# Patient Record
Sex: FEMALE | Race: WHITE | NOT HISPANIC OR LATINO | Employment: OTHER | URBAN - METROPOLITAN AREA
[De-identification: names, ages, dates, MRNs, and addresses within clinical notes are randomized per-mention and may not be internally consistent; named-entity substitution may affect disease eponyms.]

---

## 2017-05-12 ENCOUNTER — OFFICE VISIT (OUTPATIENT)
Dept: URGENT CARE | Facility: CLINIC | Age: 72
End: 2017-05-12
Payer: COMMERCIAL

## 2017-05-12 VITALS
RESPIRATION RATE: 16 BRPM | OXYGEN SATURATION: 94 % | BODY MASS INDEX: 29.24 KG/M2 | TEMPERATURE: 97 F | WEIGHT: 165 LBS | DIASTOLIC BLOOD PRESSURE: 82 MMHG | HEART RATE: 68 BPM | SYSTOLIC BLOOD PRESSURE: 120 MMHG

## 2017-05-12 DIAGNOSIS — K21.9 GASTROESOPHAGEAL REFLUX DISEASE, ESOPHAGITIS PRESENCE NOT SPECIFIED: ICD-10-CM

## 2017-05-12 PROCEDURE — 4040F PNEUMOC VAC/ADMIN/RCVD: CPT | Performed by: FAMILY MEDICINE

## 2017-05-12 PROCEDURE — 3014F SCREEN MAMMO DOC REV: CPT | Performed by: FAMILY MEDICINE

## 2017-05-12 PROCEDURE — G8419 CALC BMI OUT NRM PARAM NOF/U: HCPCS | Performed by: FAMILY MEDICINE

## 2017-05-12 PROCEDURE — 1036F TOBACCO NON-USER: CPT | Performed by: FAMILY MEDICINE

## 2017-05-12 PROCEDURE — G8432 DEP SCR NOT DOC, RNG: HCPCS | Performed by: FAMILY MEDICINE

## 2017-05-12 PROCEDURE — 99214 OFFICE O/P EST MOD 30 MIN: CPT | Performed by: FAMILY MEDICINE

## 2017-05-12 PROCEDURE — 1101F PT FALLS ASSESS-DOCD LE1/YR: CPT | Performed by: FAMILY MEDICINE

## 2017-05-12 RX ORDER — OMEPRAZOLE 20 MG/1
20 CAPSULE, DELAYED RELEASE ORAL 2 TIMES DAILY
Qty: 60 CAP | Refills: 1 | Status: SHIPPED | OUTPATIENT
Start: 2017-05-12 | End: 2017-06-11

## 2017-05-12 RX ORDER — RANITIDINE 150 MG/1
150 TABLET ORAL 2 TIMES DAILY
COMMUNITY
End: 2019-01-24

## 2017-05-12 ASSESSMENT — ENCOUNTER SYMPTOMS
DIARRHEA: 0
COUGH: 1
SENSORY CHANGE: 0
SPUTUM PRODUCTION: 0
WEIGHT LOSS: 0
FOCAL WEAKNESS: 0
VOMITING: 0
ABDOMINAL PAIN: 0

## 2017-05-12 NOTE — MR AVS SNAPSHOT
"        Alotn FernandezBro   2017 10:30 AM   Office Visit   MRN: [id number]    Department:  [facility]   Dept Phone:  [phone]    Description:  Female : 1945   Provider:  Jacob Booth M.D.           Reason for Visit     Gastrophageal Reflux x 2 wks, burning pain on mid chest, feeling boated and light headed off /on      Allergies as of 2017     Allergen Noted Reactions    Penicillins 2012       'NASTY\"      You were diagnosed with     Gastroesophageal reflux disease, esophagitis presence not specified   [[id number]]         Vital Signs     Blood Pressure Pulse Temperature Respirations Weight Oxygen Saturation    120/82 mmHg 68 36.1 °C (97 °F) 16 74.844 kg (165 lb) 94%    Smoking Status                   Former Smoker           Basic Information     Date Of Birth Sex Race Ethnicity Preferred Language    1945 Female White Non- English      Problem List              ICD-10-CM Priority Class Noted - Resolved    HTN (hypertension), benign (Chronic) I10   2011 - Present    DJD (degenerative joint disease) of cervical spine M47.812   2012 - Present    Dyslipidemia E78.5   10/25/2012 - Present    Acquired hypothyroidism E03.9   2015 - Present    Tinnitus H93.19   2015 - Present    Family history of breast cancer Z80.3   2016 - Present      Health Maintenance        Date Due Completion Dates    IMM DTaP/Tdap/Td Vaccine (1 - Tdap) 1964 ---    MAMMOGRAM 1985 ---    IMM ZOSTER VACCINE 2005 ---    BONE DENSITY 2010 ---    COLON CANCER SCREENING ANNUAL FIT 2015            Current Immunizations     13-VALENT PCV PREVNAR 2016    Pneumococcal polysaccharide vaccine (PPSV-23) 2012  9:30 AM      Below and/or attached are the medications your provider expects you to take. Review all of your home medications and newly ordered medications with your provider and/or pharmacist. Follow medication instructions as directed by " your provider and/or pharmacist. Please keep your medication list with you and share with your provider. Update the information when medications are discontinued, doses are changed, or new medications (including over-the-counter products) are added; and carry medication information at all times in the event of emergency situations     Allergies:  PENICILLINS - (reactions not documented)               Medications  Valid as of: May 12, 2017 - 11:07 AM    Generic Name Brand Name Tablet Size Instructions for use    Benzonatate (Cap) TESSALON 200 MG Take 1 Cap by mouth 3 times a day as needed for Cough.        Cholecalciferol (Tab) vitamin D 2000 UNIT Take 2,000 Tabs by mouth every day.        HydroCHLOROthiazide (Tab) HYDRODIURIL 25 MG Take 1 Tab by mouth every day.        Levothyroxine Sodium (Tab) SYNTHROID 100 MCG TAKE 1 TABLET BY MOUTH EVERY MORNING LIAN EMPTY STOMACH        Losartan Potassium (Tab) COZAAR 100 MG TAKE 1 TABLET BY MOUTH ONCE DAILY*GENERIC FOR COZAAR        Magnesium Gluconate (Tab) MAG-G 500 MG Take 1,000 mg by mouth 3 times a day. Indications: 1000mg daily        Multiple Vitamin   Take  by mouth every day.        Omeprazole (CAPSULE DELAYED RELEASE) PRILOSEC 20 MG Take 1 Cap by mouth 2 times a day for 30 days.        RaNITidine HCl (Tab) ZANTAC 150 MG Take 150 mg by mouth 2 times a day.        .                 Medicines prescribed today were sent to:     [id number] [place] [facility] [address]    [address] [facility] [place] [address]    Phone: [phone] Fax: [phone]    Open 24 Hours?: No      Medication refill instructions:       If your prescription bottle indicates you have medication refills left, it is not necessary to call your provider’s office. Please contact your pharmacy and they will refill your medication.    If your prescription bottle indicates you do not have any refills left, you may request refills at any time through one of the following ways: The online [employer] system (except  Urgent Care), by calling your provider’s office, or by asking your pharmacy to contact your provider’s office with a refill request. Medication refills are processed only during regular business hours and may not be available until the next business day. Your provider may request additional information or to have a follow-up visit with you prior to refilling your medication.   *Please Note: Medication refills are assigned a new Rx number when refilled electronically. Your pharmacy may indicate that no refills were authorized even though a new prescription for the same medication is available at the pharmacy. Please request the medicine by name with the pharmacy before contacting your provider for a refill.        Referral     A referral request has been sent to our patient care coordination department. Please allow 3-5 business days for us to process this request and contact you either by phone or mail. If you do not hear from us by the 5th business day, please call us at [phone].           [employer] Access Code: Activation code not generated  Current [employer] Status: Active

## 2017-05-12 NOTE — PROGRESS NOTES
Subjective:      Alton Childs is a 71 y.o. female who presents with Gastrophageal Reflux            Gastrophageal Reflux  She complains of coughing (intermittent). She reports no abdominal pain. Pertinent negatives include no weight loss.   2 weeks burning chest pain to throat. Has been relatively constant although waxing and waning. Currently mild. Possibly related to drinking expresso. Mild relief with zantac. Improves with belching. Chronic palpitations unchanged. No nausea. No diaphoresis. No change with exertion. No change with position but always sleeps up due to chronic neck issues.   No other aggravating or alleviating factors.    Review of Systems   Constitutional: Negative for weight loss and malaise/fatigue.   Respiratory: Positive for cough (intermittent). Negative for sputum production.    Cardiovascular: Negative for leg swelling.   Gastrointestinal: Negative for vomiting, abdominal pain and diarrhea.   Neurological: Negative for sensory change and focal weakness.   .  Medications, Allergies, and current problem list reviewed today in Epic         Objective:     /82 mmHg  Pulse 68  Temp(Src) 36.1 °C (97 °F)  Resp 16  Wt 74.844 kg (165 lb)  SpO2 94%     Physical Exam   Constitutional: She appears well-developed and well-nourished. No distress.   HENT:   Head: Normocephalic and atraumatic.   Eyes: Conjunctivae are normal.   Neck: Neck supple.   Cardiovascular: Normal rate and regular rhythm.    Murmur (soft systolic RUSB) heard.  Pulmonary/Chest: Effort normal and breath sounds normal. She has no wheezes.   Abdominal: Soft. Bowel sounds are normal. She exhibits no mass. There is no tenderness. There is no guarding.   Lymphadenopathy:     She has no cervical adenopathy.   Skin: Skin is warm and dry. No rash noted.               Assessment/Plan:     1. Gastroesophageal reflux disease, esophagitis presence not specified  REFERRAL TO GASTROENTEROLOGY    omeprazole (PRILOSEC) 20 MG  delayed-release capsule     Differential diagnosis, natural history, supportive care, and indications for immediate follow-up discussed at length.

## 2017-05-17 ENCOUNTER — HOSPITAL ENCOUNTER (OUTPATIENT)
Dept: RADIOLOGY | Facility: MEDICAL CENTER | Age: 72
End: 2017-05-17
Attending: INTERNAL MEDICINE
Payer: COMMERCIAL

## 2017-05-17 ENCOUNTER — OFFICE VISIT (OUTPATIENT)
Dept: MEDICAL GROUP | Facility: MEDICAL CENTER | Age: 72
End: 2017-05-17
Payer: COMMERCIAL

## 2017-05-17 VITALS
OXYGEN SATURATION: 94 % | TEMPERATURE: 97.3 F | BODY MASS INDEX: 28 KG/M2 | HEART RATE: 66 BPM | SYSTOLIC BLOOD PRESSURE: 122 MMHG | WEIGHT: 164 LBS | DIASTOLIC BLOOD PRESSURE: 70 MMHG | RESPIRATION RATE: 16 BRPM | HEIGHT: 64 IN

## 2017-05-17 DIAGNOSIS — K21.9 GASTROESOPHAGEAL REFLUX DISEASE WITHOUT ESOPHAGITIS: ICD-10-CM

## 2017-05-17 DIAGNOSIS — Z12.31 VISIT FOR SCREENING MAMMOGRAM: ICD-10-CM

## 2017-05-17 DIAGNOSIS — E03.9 ACQUIRED HYPOTHYROIDISM: ICD-10-CM

## 2017-05-17 DIAGNOSIS — Z13.220 SCREENING, LIPID: ICD-10-CM

## 2017-05-17 DIAGNOSIS — R07.9 CHEST PAIN, UNSPECIFIED TYPE: ICD-10-CM

## 2017-05-17 DIAGNOSIS — E78.5 DYSLIPIDEMIA: ICD-10-CM

## 2017-05-17 DIAGNOSIS — I10 HTN (HYPERTENSION), BENIGN: Chronic | ICD-10-CM

## 2017-05-17 PROCEDURE — 71020 DX-CHEST-2 VIEWS: CPT

## 2017-05-17 PROCEDURE — G8432 DEP SCR NOT DOC, RNG: HCPCS | Performed by: INTERNAL MEDICINE

## 2017-05-17 PROCEDURE — 93000 ELECTROCARDIOGRAM COMPLETE: CPT | Performed by: INTERNAL MEDICINE

## 2017-05-17 PROCEDURE — 1101F PT FALLS ASSESS-DOCD LE1/YR: CPT | Performed by: INTERNAL MEDICINE

## 2017-05-17 PROCEDURE — G8419 CALC BMI OUT NRM PARAM NOF/U: HCPCS | Performed by: INTERNAL MEDICINE

## 2017-05-17 PROCEDURE — 3014F SCREEN MAMMO DOC REV: CPT | Performed by: INTERNAL MEDICINE

## 2017-05-17 PROCEDURE — 99215 OFFICE O/P EST HI 40 MIN: CPT | Performed by: INTERNAL MEDICINE

## 2017-05-17 PROCEDURE — 4040F PNEUMOC VAC/ADMIN/RCVD: CPT | Performed by: INTERNAL MEDICINE

## 2017-05-17 PROCEDURE — 1036F TOBACCO NON-USER: CPT | Performed by: INTERNAL MEDICINE

## 2017-05-17 NOTE — Clinical Note
May 17, 2017        Alton Childs      Please excuse work 5/15/17-5/22/17.                         Rufino Robles

## 2017-05-17 NOTE — PROGRESS NOTES
CC: Multiple issues    HPI:   Alton presents today with the following.    1. Chest pain, unspecified type  Presents after going to urgent care complaining of chest pain. She was given acid medications with 20% improvement in symptoms. She has no blood in her stool or black stool which is having some mild diarrhea. She reports there is some pain radiating around her breasts. She has not been interested in general screenings in the past. She is not been active during this time frame she cannot tell if activity worsens her symptoms or not. There is no relationship to food. She has no fevers or chills. She did have a preceding cough that is since resolved as well. No hemoptysis. She does report some dizziness.    2. Gastroesophageal reflux disease without esophagitis  She does have reflux symptoms on Prilosec has improved her symptoms slightly. Reports stools are slightly loose however no black stool.     3. Acquired hypothyroidism  Well overdue for recheck of her thyroid.    4. HTN (hypertension), benign  Compliant blood pressure medications currently well controlled.    5. Dyslipidemia  Currently not on meds coming due for recheck blood work.    6. Screening, lipid      7. Visit for screening mammogram        Patient Active Problem List    Diagnosis Date Noted   • Family history of breast cancer 04/21/2016   • Tinnitus 06/09/2015   • Acquired hypothyroidism 06/02/2015   • Dyslipidemia 10/25/2012   • DJD (degenerative joint disease) of cervical spine 07/18/2012   • HTN (hypertension), benign 04/22/2011       Current Outpatient Prescriptions   Medication Sig Dispense Refill   • ranitidine (ZANTAC) 150 MG Tab Take 150 mg by mouth 2 times a day.     • omeprazole (PRILOSEC) 20 MG delayed-release capsule Take 1 Cap by mouth 2 times a day for 30 days. 60 Cap 1   • hydrochlorothiazide (HYDRODIURIL) 25 MG Tab Take 1 Tab by mouth every day. 90 Tab 1   • magnesium gluconate (MAG-G) 500 MG tablet Take 1,000 mg by mouth 3 times a  "day. Indications: 1000mg daily     • losartan (COZAAR) 100 MG Tab TAKE 1 TABLET BY MOUTH ONCE DAILY*GENERIC FOR COZAAR 30 Tab 11   • levothyroxine (SYNTHROID) 100 MCG Tab TAKE 1 TABLET BY MOUTH EVERY MORNING LIAN EMPTY STOMACH 30 Tab 11   • Cholecalciferol (VITAMIN D) 2000 UNIT TABS Take 2,000 Tabs by mouth every day. 30 Each 6   • Multiple Vitamin (MULTI-VITAMIN PO) Take  by mouth every day.       No current facility-administered medications for this visit.         Allergies as of 05/17/2017 - Casey as Reviewed 05/17/2017   Allergen Reaction Noted   • Penicillins  07/11/2012        ROS: As per HPI.    /70 mmHg  Pulse 66  Temp(Src) 36.3 °C (97.3 °F)  Resp 16  Ht 1.626 m (5' 4\")  Wt 74.39 kg (164 lb)  BMI 28.14 kg/m2  SpO2 94%    Physical Exam:  Gen:         Alert and oriented, No apparent distress.  Neck:        No Lymphadenopathy or Bruits.  Lungs:     Clear to auscultation bilaterally  CV:          Regular rate and rhythm. No murmurs, rubs or gallops.               Ext:          No clubbing, cyanosis, edema.    EKG:  Normal sinus rythm, no acute st-t wave changes.  Assessment and Plan.   71 y.o. female with the following issues.    1. Chest pain, unspecified type  Given the vagueness of symptomology broad range of differential. She already was been referred to gastroenterology and doubling on her PIP. She does not have significant functional capacity therefore sending for chemical stress test.  - DX-CHEST-2 VIEWS; Future  - EKG    2. Gastroesophageal reflux disease without esophagitis  She will follow up GI.  - CBC WITH DIFFERENTIAL; Future    3. Acquired hypothyroidism  Rechecking blood work  - TSH; Future    4. HTN (hypertension), benign  Currently well controlled, Discuss diet, exercise and salt restriction.    5. Dyslipidemia  Rechecking cholesterol  - COMP METABOLIC PANEL; Future  - LIPID PROFILE; Future    6. Screening, lipid      7. Visit for screening mammogram    - MA-SCREEN MAMMO W/CAD-BILAT; " Future      Over 40 minutes was spent with the patient of which over 50% of the time was spent with face-to-face patient education and care coordination.

## 2017-05-17 NOTE — MR AVS SNAPSHOT
"        Alton Ibrahimangela   2017 7:40 AM   Office Visit   MRN: [id number]    Department:  [address] [place] [address]   Dept Phone:  [phone]    Description:  Female : 1945   Provider:  Rufino Robles M.D.           Reason for Visit     GI Problem Reflux      Allergies as of 2017     Allergen Noted Reactions    Penicillins 2012       'NASTY\"      You were diagnosed with     Chest pain, unspecified type   [[id number]]       Gastroesophageal reflux disease without esophagitis   [018958]       Acquired hypothyroidism   [3490812]       HTN (hypertension), benign   [293660]       Dyslipidemia   [214886]       Screening, lipid   [354483]       Visit for screening mammogram   [[id number]]         Vital Signs     Blood Pressure Pulse Temperature Respirations Height Weight    122/70 mmHg 66 36.3 °C (97.3 °F) 16 1.626 m (5' 4\") 74.39 kg (164 lb)    Body Mass Index Oxygen Saturation Smoking Status             28.14 kg/m2 94% Former Smoker         Basic Information     Date Of Birth Sex Race Ethnicity Preferred Language    1945 Female White Non- English      Problem List              ICD-10-CM Priority Class Noted - Resolved    HTN (hypertension), benign (Chronic) I10   2011 - Present    DJD (degenerative joint disease) of cervical spine M47.812   2012 - Present    Dyslipidemia E78.5   10/25/2012 - Present    Acquired hypothyroidism E03.9   2015 - Present    Tinnitus H93.19   2015 - Present    Family history of breast cancer Z80.3   2016 - Present      Health Maintenance        Date Due Completion Dates    IMM DTaP/Tdap/Td Vaccine (1 - Tdap) 1964 ---    MAMMOGRAM 1985 ---    IMM ZOSTER VACCINE 2005 ---    BONE DENSITY 2010 ---    COLON CANCER SCREENING ANNUAL FIT 2015            Current Immunizations     13-VALENT PCV PREVNAR 2016    Pneumococcal polysaccharide vaccine (PPSV-23) 2012  9:30 AM      Below and/or attached are the " medications your provider expects you to take. Review all of your home medications and newly ordered medications with your provider and/or pharmacist. Follow medication instructions as directed by your provider and/or pharmacist. Please keep your medication list with you and share with your provider. Update the information when medications are discontinued, doses are changed, or new medications (including over-the-counter products) are added; and carry medication information at all times in the event of emergency situations     Allergies:  PENICILLINS - (reactions not documented)               Medications  Valid as of: May 17, 2017 -  8:20 AM    Generic Name Brand Name Tablet Size Instructions for use    Cholecalciferol (Tab) vitamin D 2000 UNIT Take 2,000 Tabs by mouth every day.        HydroCHLOROthiazide (Tab) HYDRODIURIL 25 MG Take 1 Tab by mouth every day.        Levothyroxine Sodium (Tab) SYNTHROID 100 MCG TAKE 1 TABLET BY MOUTH EVERY MORNING LIAN EMPTY STOMACH        Losartan Potassium (Tab) COZAAR 100 MG TAKE 1 TABLET BY MOUTH ONCE DAILY*GENERIC FOR COZAAR        Magnesium Gluconate (Tab) MAG-G 500 MG Take 1,000 mg by mouth 3 times a day. Indications: 1000mg daily        Multiple Vitamin   Take  by mouth every day.        Omeprazole (CAPSULE DELAYED RELEASE) PRILOSEC 20 MG Take 1 Cap by mouth 2 times a day for 30 days.        RaNITidine HCl (Tab) ZANTAC 150 MG Take 150 mg by mouth 2 times a day.        .                 Medicines prescribed today were sent to:     [id number] [place] - [address]    [address] [facility] [address]    Phone: [phone] Fax: [phone]    Open 24 Hours?: No      Medication refill instructions:       If your prescription bottle indicates you have medication refills left, it is not necessary to call your provider’s office. Please contact your pharmacy and they will refill your medication.    If your prescription bottle indicates you do not have any refills left, you  may request refills at any time through one of the following ways: The online [employer] system (except Urgent Care), by calling your provider’s office, or by asking your pharmacy to contact your provider’s office with a refill request. Medication refills are processed only during regular business hours and may not be available until the next business day. Your provider may request additional information or to have a follow-up visit with you prior to refilling your medication.   *Please Note: Medication refills are assigned a new Rx number when refilled electronically. Your pharmacy may indicate that no refills were authorized even though a new prescription for the same medication is available at the pharmacy. Please request the medicine by name with the pharmacy before contacting your provider for a refill.        Your To Do List     Future Labs/Procedures Complete By Expires    CBC WITH DIFFERENTIAL  As directed 5/18/2018    COMP METABOLIC PANEL  As directed 5/18/2018    DX-CHEST-2 VIEWS  As directed 5/17/2018    LIPID PROFILE  As directed 5/18/2018    MA-SCREEN MAMMO W/CAD-BILAT  As directed 6/18/2018    NM-CARDIAC STRESS TEST  As directed 5/17/2018    TSH  As directed 5/18/2018         [employer] Access Code: Activation code not generated  Current [employer] Status: Active

## 2017-05-17 NOTE — Clinical Note
May 17, 2017        Alton Childs        Patient having medical concerns and needs to be excused for testing within then several weeks.                      Rufino Robles

## 2017-05-18 ENCOUNTER — HOSPITAL ENCOUNTER (OUTPATIENT)
Dept: LAB | Facility: MEDICAL CENTER | Age: 72
End: 2017-05-18
Attending: INTERNAL MEDICINE
Payer: MEDICARE

## 2017-05-18 DIAGNOSIS — E03.9 ACQUIRED HYPOTHYROIDISM: ICD-10-CM

## 2017-05-18 DIAGNOSIS — E78.5 DYSLIPIDEMIA: ICD-10-CM

## 2017-05-18 DIAGNOSIS — K21.9 GASTROESOPHAGEAL REFLUX DISEASE WITHOUT ESOPHAGITIS: ICD-10-CM

## 2017-05-18 LAB
ALBUMIN SERPL BCP-MCNC: 4.1 G/DL (ref 3.2–4.9)
ALBUMIN/GLOB SERPL: 1.3 G/DL
ALP SERPL-CCNC: 114 U/L (ref 30–99)
ALT SERPL-CCNC: 15 U/L (ref 2–50)
ANION GAP SERPL CALC-SCNC: 6 MMOL/L (ref 0–11.9)
AST SERPL-CCNC: 18 U/L (ref 12–45)
BASOPHILS # BLD AUTO: 0.6 % (ref 0–1.8)
BASOPHILS # BLD: 0.05 K/UL (ref 0–0.12)
BILIRUB SERPL-MCNC: 0.9 MG/DL (ref 0.1–1.5)
BUN SERPL-MCNC: 20 MG/DL (ref 8–22)
CALCIUM SERPL-MCNC: 9.8 MG/DL (ref 8.5–10.5)
CHLORIDE SERPL-SCNC: 105 MMOL/L (ref 96–112)
CHOLEST SERPL-MCNC: 223 MG/DL (ref 100–199)
CO2 SERPL-SCNC: 27 MMOL/L (ref 20–33)
CREAT SERPL-MCNC: 0.78 MG/DL (ref 0.5–1.4)
EOSINOPHIL # BLD AUTO: 0.05 K/UL (ref 0–0.51)
EOSINOPHIL NFR BLD: 0.6 % (ref 0–6.9)
ERYTHROCYTE [DISTWIDTH] IN BLOOD BY AUTOMATED COUNT: 44 FL (ref 35.9–50)
GFR SERPL CREATININE-BSD FRML MDRD: >60 ML/MIN/1.73 M 2
GLOBULIN SER CALC-MCNC: 3.2 G/DL (ref 1.9–3.5)
GLUCOSE SERPL-MCNC: 86 MG/DL (ref 65–99)
HCT VFR BLD AUTO: 48 % (ref 37–47)
HDLC SERPL-MCNC: 69 MG/DL
HGB BLD-MCNC: 15.8 G/DL (ref 12–16)
IMM GRANULOCYTES # BLD AUTO: 0.02 K/UL (ref 0–0.11)
IMM GRANULOCYTES NFR BLD AUTO: 0.3 % (ref 0–0.9)
LDLC SERPL CALC-MCNC: 128 MG/DL
LYMPHOCYTES # BLD AUTO: 1.65 K/UL (ref 1–4.8)
LYMPHOCYTES NFR BLD: 21.2 % (ref 22–41)
MCH RBC QN AUTO: 31.8 PG (ref 27–33)
MCHC RBC AUTO-ENTMCNC: 32.9 G/DL (ref 33.6–35)
MCV RBC AUTO: 96.6 FL (ref 81.4–97.8)
MONOCYTES # BLD AUTO: 0.41 K/UL (ref 0–0.85)
MONOCYTES NFR BLD AUTO: 5.3 % (ref 0–13.4)
NEUTROPHILS # BLD AUTO: 5.59 K/UL (ref 2–7.15)
NEUTROPHILS NFR BLD: 72 % (ref 44–72)
NRBC # BLD AUTO: 0 K/UL
NRBC BLD AUTO-RTO: 0 /100 WBC
PLATELET # BLD AUTO: 313 K/UL (ref 164–446)
PMV BLD AUTO: 10.8 FL (ref 9–12.9)
POTASSIUM SERPL-SCNC: 3.9 MMOL/L (ref 3.6–5.5)
PROT SERPL-MCNC: 7.3 G/DL (ref 6–8.2)
RBC # BLD AUTO: 4.97 M/UL (ref 4.2–5.4)
SODIUM SERPL-SCNC: 138 MMOL/L (ref 135–145)
TRIGL SERPL-MCNC: 132 MG/DL (ref 0–149)
TSH SERPL DL<=0.005 MIU/L-ACNC: 1.11 UIU/ML (ref 0.3–3.7)
WBC # BLD AUTO: 7.8 K/UL (ref 4.8–10.8)

## 2017-05-18 PROCEDURE — 84443 ASSAY THYROID STIM HORMONE: CPT

## 2017-05-18 PROCEDURE — 85025 COMPLETE CBC W/AUTO DIFF WBC: CPT

## 2017-05-18 PROCEDURE — 80053 COMPREHEN METABOLIC PANEL: CPT

## 2017-05-18 PROCEDURE — 36415 COLL VENOUS BLD VENIPUNCTURE: CPT

## 2017-05-18 PROCEDURE — 80061 LIPID PANEL: CPT

## 2017-05-19 ENCOUNTER — HOSPITAL ENCOUNTER (OUTPATIENT)
Dept: RADIOLOGY | Facility: MEDICAL CENTER | Age: 72
End: 2017-05-19
Attending: INTERNAL MEDICINE
Payer: MEDICARE

## 2017-05-19 DIAGNOSIS — Z12.31 VISIT FOR SCREENING MAMMOGRAM: ICD-10-CM

## 2017-05-19 PROCEDURE — 77063 BREAST TOMOSYNTHESIS BI: CPT

## 2017-05-22 ENCOUNTER — HOSPITAL ENCOUNTER (OUTPATIENT)
Dept: RADIOLOGY | Facility: MEDICAL CENTER | Age: 72
End: 2017-05-22
Attending: INTERNAL MEDICINE
Payer: COMMERCIAL

## 2017-05-22 DIAGNOSIS — R07.9 CHEST PAIN, UNSPECIFIED TYPE: ICD-10-CM

## 2017-05-22 PROCEDURE — 700111 HCHG RX REV CODE 636 W/ 250 OVERRIDE (IP)

## 2017-05-22 PROCEDURE — A9502 TC99M TETROFOSMIN: HCPCS

## 2017-05-22 RX ORDER — REGADENOSON 0.08 MG/ML
INJECTION, SOLUTION INTRAVENOUS
Status: COMPLETED
Start: 2017-05-22 | End: 2017-05-22

## 2017-05-22 RX ADMIN — REGADENOSON 0.4 MG: 0.08 INJECTION, SOLUTION INTRAVENOUS at 11:05

## 2017-05-22 NOTE — PROGRESS NOTES
Patient educated re: Pharmacological NM MPI. Nursing goals identified: knowledge deficit, potential for anxiety r/t stress test, potential for compromised cardiac output. Care plan includes educating patient, reassurance and access to ACLS cart/team. Labs and ECG reviewed. Pt denies CP. No caffeine and NPO confirmed. After resting images attained, patient prepped for pharmacological stress. Lexiscan given while patient ambulated on TM. Patient reported these symptoms: SOB, leg fatigue, mild headache. Caffeinated beverage provided. Symptoms resolved. Patient awaiting post cardiac stress imaging.

## 2017-06-07 DIAGNOSIS — I10 HTN (HYPERTENSION), BENIGN: Chronic | ICD-10-CM

## 2017-06-07 RX ORDER — HYDROCHLOROTHIAZIDE 25 MG/1
25 TABLET ORAL DAILY
Qty: 90 TAB | Refills: 3 | Status: SHIPPED | OUTPATIENT
Start: 2017-06-07 | End: 2018-07-09 | Stop reason: SDUPTHER

## 2017-08-17 ENCOUNTER — OFFICE VISIT (OUTPATIENT)
Dept: URGENT CARE | Facility: CLINIC | Age: 72
End: 2017-08-17
Payer: COMMERCIAL

## 2017-08-17 ENCOUNTER — APPOINTMENT (OUTPATIENT)
Dept: RADIOLOGY | Facility: IMAGING CENTER | Age: 72
End: 2017-08-17
Attending: PHYSICIAN ASSISTANT
Payer: COMMERCIAL

## 2017-08-17 VITALS
TEMPERATURE: 98.2 F | DIASTOLIC BLOOD PRESSURE: 74 MMHG | HEART RATE: 64 BPM | RESPIRATION RATE: 16 BRPM | WEIGHT: 155 LBS | HEIGHT: 64 IN | SYSTOLIC BLOOD PRESSURE: 112 MMHG | OXYGEN SATURATION: 96 % | BODY MASS INDEX: 26.46 KG/M2

## 2017-08-17 DIAGNOSIS — M25.362 INSTABILITY OF KNEE JOINT, LEFT: Primary | ICD-10-CM

## 2017-08-17 DIAGNOSIS — M25.562 ACUTE PAIN OF LEFT KNEE: ICD-10-CM

## 2017-08-17 DIAGNOSIS — M25.562 POSTERIOR KNEE PAIN, LEFT: ICD-10-CM

## 2017-08-17 PROCEDURE — 99213 OFFICE O/P EST LOW 20 MIN: CPT | Performed by: PHYSICIAN ASSISTANT

## 2017-08-17 PROCEDURE — 73562 X-RAY EXAM OF KNEE 3: CPT | Mod: TC,LT | Performed by: PHYSICIAN ASSISTANT

## 2017-08-17 NOTE — PATIENT INSTRUCTIONS
Elevate, rest, ice and immobilize.  Ibuprofen as needed for pain 400mg with food every 6-8 hours.    Knee Pain  Knee pain is a very common symptom and can have many causes. Knee pain often goes away when you follow your health care provider's instructions for relieving pain and discomfort at home. However, knee pain can develop into a condition that needs treatment. Some conditions may include:  · Arthritis caused by wear and tear (osteoarthritis).  · Arthritis caused by swelling and irritation (rheumatoid arthritis or gout).  · A cyst or growth in your knee.  · An infection in your knee joint.  · An injury that will not heal.  · Damage, swelling, or irritation of the tissues that support your knee (torn ligaments or tendinitis).  If your knee pain continues, additional tests may be ordered to diagnose your condition. Tests may include X-rays or other imaging studies of your knee. You may also need to have fluid removed from your knee. Treatment for ongoing knee pain depends on the cause, but treatment may include:  · Medicines to relieve pain or swelling.  · Steroid injections in your knee.  · Physical therapy.  · Surgery.  HOME CARE INSTRUCTIONS  · Take medicines only as directed by your health care provider.  · Rest your knee and keep it raised (elevated) while you are resting.  · Do not do things that cause or worsen pain.  · Avoid high-impact activities or exercises, such as running, jumping rope, or doing jumping jacks.  · Apply ice to the knee area:  ¨ Put ice in a plastic bag.  ¨ Place a towel between your skin and the bag.  ¨ Leave the ice on for 20 minutes, 2-3 times a day.  · Ask your health care provider if you should wear an elastic knee support.  · Keep a pillow under your knee when you sleep.  · Lose weight if you are overweight. Extra weight can put pressure on your knee.  · Do not use any tobacco products, including cigarettes, chewing tobacco, or electronic cigarettes. If you need help quitting,  ask your health care provider. Smoking may slow the healing of any bone and joint problems that you may have.  SEEK MEDICAL CARE IF:  · Your knee pain continues, changes, or gets worse.  · You have a fever along with knee pain.  · Your knee radha or locks up.  · Your knee becomes more swollen.  SEEK IMMEDIATE MEDICAL CARE IF:   · Your knee joint feels hot to the touch.  · You have chest pain or trouble breathing.     This information is not intended to replace advice given to you by your health care provider. Make sure you discuss any questions you have with your health care provider.     Document Released: 10/14/2008 Document Revised: 01/08/2016 Document Reviewed: 08/03/2015  [employer] Interactive Patient Education ©2016 [employer]

## 2017-08-17 NOTE — MR AVS SNAPSHOT
"        Alton FernandezBro   2017 10:00 AM   Office Visit   MRN: [id number]    Department:  [facility]   Dept Phone:  [phone]    Description:  Female : 1945   Provider:  Kuldip Anderson PA-C           Reason for Visit     Knee Pain x 1 wk, Lt. knee and left leg pain, pain is worse w/ pressure or walking      Allergies as of 2017     Allergen Noted Reactions    Penicillins 2012       'NASTY\"      You were diagnosed with     Instability of knee joint, left   [634221]  -  Primary     Acute pain of left knee   [3385112]       Posterior knee pain, left   [[id number]]         Vital Signs     Blood Pressure Pulse Temperature Respirations Height Weight    112/74 mmHg 64 36.8 °C (98.2 °F) 16 1.626 m (5' 4.02\") 70.308 kg (155 lb)    Body Mass Index Oxygen Saturation Smoking Status             26.59 kg/m2 96% Former Smoker         Basic Information     Date Of Birth Sex Race Ethnicity Preferred Language    1945 Female White Non- English      Problem List              ICD-10-CM Priority Class Noted - Resolved    HTN (hypertension), benign (Chronic) I10   2011 - Present    DJD (degenerative joint disease) of cervical spine M47.812   2012 - Present    Dyslipidemia E78.5   10/25/2012 - Present    Acquired hypothyroidism E03.9   2015 - Present    Tinnitus H93.19   2015 - Present    Family history of breast cancer Z80.3   2016 - Present      Health Maintenance        Date Due Completion Dates    IMM DTaP/Tdap/Td Vaccine (1 - Tdap) 1964 ---    IMM ZOSTER VACCINE 2005 ---    BONE DENSITY 2010 ---    COLON CANCER SCREENING ANNUAL FIT 2015    IMM INFLUENZA (1) 2017 ---    MAMMOGRAM 2018            Current Immunizations     13-VALENT PCV PREVNAR 2016    Pneumococcal polysaccharide vaccine (PPSV-23) 2012  9:30 AM      Below and/or attached are the medications your provider expects you to take. Review all of " your home medications and newly ordered medications with your provider and/or pharmacist. Follow medication instructions as directed by your provider and/or pharmacist. Please keep your medication list with you and share with your provider. Update the information when medications are discontinued, doses are changed, or new medications (including over-the-counter products) are added; and carry medication information at all times in the event of emergency situations     Allergies:  PENICILLINS - (reactions not documented)               Medications  Valid as of: August 17, 2017 - 11:09 AM    Generic Name Brand Name Tablet Size Instructions for use    Cholecalciferol (Tab) vitamin D 2000 UNIT Take 2,000 Tabs by mouth every day.        HydroCHLOROthiazide (Tab) HYDRODIURIL 25 MG Take 1 Tab by mouth every day.        Levothyroxine Sodium (Tab) SYNTHROID 100 MCG TAKE 1 TABLET BY MOUTH EVERY MORNING ON AN EMPTY STOMACH        Losartan Potassium (Tab) COZAAR 100 MG TAKE 1 TABLET BY MOUTH ONCE DAILY *GENERIC FOR COZAAR        Magnesium Gluconate (Tab) MAG-G 500 MG Take 1,000 mg by mouth 3 times a day. Indications: 1000mg daily        Multiple Vitamin   Take  by mouth every day.        RaNITidine HCl (Tab) ZANTAC 150 MG Take 150 mg by mouth 2 times a day.        .                 Medicines prescribed today were sent to:     [id number] - [place], [place] - [address]    [address] [facility] [address]    Phone: [phone] Fax: [phone]    Open 24 Hours?: No      Medication refill instructions:       If your prescription bottle indicates you have medication refills left, it is not necessary to call your provider’s office. Please contact your pharmacy and they will refill your medication.    If your prescription bottle indicates you do not have any refills left, you may request refills at any time through one of the following ways: The online for[MD] system (except Urgent Care), by calling your provider’s office, or by asking  your pharmacy to contact your provider’s office with a refill request. Medication refills are processed only during regular business hours and may not be available until the next business day. Your provider may request additional information or to have a follow-up visit with you prior to refilling your medication.   *Please Note: Medication refills are assigned a new Rx number when refilled electronically. Your pharmacy may indicate that no refills were authorized even though a new prescription for the same medication is available at the pharmacy. Please request the medicine by name with the pharmacy before contacting your provider for a refill.        Referral     A referral request has been sent to our patient care coordination department. Please allow 3-5 business days for us to process this request and contact you either by phone or mail. If you do not hear from us by the 5th business day, please call us at [phone].        Instructions    Elevate, rest, ice and immobilize.  Ibuprofen as needed for pain 400mg with food every 6-8 hours.    Knee Pain  Knee pain is a very common symptom and can have many causes. Knee pain often goes away when you follow your health care provider's instructions for relieving pain and discomfort at home. However, knee pain can develop into a condition that needs treatment. Some conditions may include:  · Arthritis caused by wear and tear (osteoarthritis).  · Arthritis caused by swelling and irritation (rheumatoid arthritis or gout).  · A cyst or growth in your knee.  · An infection in your knee joint.  · An injury that will not heal.  · Damage, swelling, or irritation of the tissues that support your knee (torn ligaments or tendinitis).  If your knee pain continues, additional tests may be ordered to diagnose your condition. Tests may include X-rays or other imaging studies of your knee. You may also need to have fluid removed from your knee. Treatment for ongoing knee pain  depends on the cause, but treatment may include:  · Medicines to relieve pain or swelling.  · Steroid injections in your knee.  · Physical therapy.  · Surgery.  HOME CARE INSTRUCTIONS  · Take medicines only as directed by your health care provider.  · Rest your knee and keep it raised (elevated) while you are resting.  · Do not do things that cause or worsen pain.  · Avoid high-impact activities or exercises, such as running, jumping rope, or doing jumping jacks.  · Apply ice to the knee area:  ¨ Put ice in a plastic bag.  ¨ Place a towel between your skin and the bag.  ¨ Leave the ice on for 20 minutes, 2-3 times a day.  · Ask your health care provider if you should wear an elastic knee support.  · Keep a pillow under your knee when you sleep.  · Lose weight if you are overweight. Extra weight can put pressure on your knee.  · Do not use any tobacco products, including cigarettes, chewing tobacco, or electronic cigarettes. If you need help quitting, ask your health care provider. Smoking may slow the healing of any bone and joint problems that you may have.  SEEK MEDICAL CARE IF:  · Your knee pain continues, changes, or gets worse.  · You have a fever along with knee pain.  · Your knee radha or locks up.  · Your knee becomes more swollen.  SEEK IMMEDIATE MEDICAL CARE IF:   · Your knee joint feels hot to the touch.  · You have chest pain or trouble breathing.     This information is not intended to replace advice given to you by your health care provider. Make sure you discuss any questions you have with your health care provider.     Document Released: 10/14/2008 Document Revised: 01/08/2016 Document Reviewed: 08/03/2015  [employer] Patient Education ©2016 [employer]            [employer] Access Code: Activation code not generated  Current [employer] Status: Active

## 2017-08-17 NOTE — PROGRESS NOTES
Subjective:      Alton Childs is a 72 y.o. female who presents with Knee Pain            Knee Pain      Chief Complaint   Patient presents with   • Knee Pain     x 1 wk, Lt. knee and left leg pain, pain is worse w/ pressure or walking       HPI:  Alton Childs is a 72 y.o. female who presents with left knee and leg pain x 1 week.  No injuries.  Worse with ambulation.  Hx of arthritis.  Was at grandson's wedding. Pain seems to hurt initially with walking and improves with time.  Was at work and turned and felt a snap right behind the knee 4 hours ago.  Able to apply pressure with a straight leg only.  Severe pain with bending the knee.    No ligamentous injuries.      Past Medical History   Diagnosis Date   • HTN (hypertension), benign 4/22/2011   • Hypothyroid 4/22/2011   • Arthritis    • Heart valve disease        Past Surgical History   Procedure Laterality Date   • Cholecystectomy     • Inj,epidural,cerv/thor single  6/1/2012     Performed by ORLY ORTA [facility]   • Gyn surgery       hysterectomy   • Other orthopedic surgery       kaur bunionectomy   • Other       T&A   • Cervical fusion posterior  7/18/2012     Performed by LA NENA JENNINGS at [facility]   • Cervical decompression posterior  7/18/2012     Performed by LA NENA JENNINGS at SUR[facility]   • Cervical foraminotomy  7/18/2012     Performed by LA NENA JENNINGS at [facility]       Family History   Problem Relation Age of Onset   • Hypertension Father    • Heart Disease Father    • Genetic Paternal Grandmother    • Cancer Mother      breast       Social History     Social History   • Marital Status:      Spouse Name: N/A   • Number of Children: N/A   • Years of Education: N/A     Occupational History   • Not on file.     Social History Main Topics   • Smoking status: Former Smoker -- 0.50 packs/day for 30 years     Types: Cigarettes     Quit date: 07/11/1993   •  "Smokeless tobacco: Never Used   • Alcohol Use: 0.0 oz/week     0 Standard drinks or equivalent per week      Comment: once a year   • Drug Use: No   • Sexual Activity: Not Currently     Other Topics Concern   • Not on file     Social History Narrative         Current outpatient prescriptions:   •  losartan, TAKE 1 TABLET BY MOUTH ONCE DAILY *GENERIC FOR COZAAR  •  levothyroxine, TAKE 1 TABLET BY MOUTH EVERY MORNING ON AN EMPTY STOMACH  •  hydrochlorothiazide, 25 mg, Oral, DAILY  •  magnesium gluconate, 1,000 mg, Oral, TID  •  Multiple Vitamin (MULTI-VITAMIN PO), Take  by mouth every day.  •  ranitidine, 150 mg, Oral, BID  •  vitamin D, 2,000 Each, Oral, DAILY    Allergies   Allergen Reactions   • Penicillins      'NASTY\"            ROS       Objective:     /74 mmHg  Pulse 64  Temp(Src) 36.8 °C (98.2 °F)  Resp 16  Ht 1.626 m (5' 4.02\")  Wt 70.308 kg (155 lb)  BMI 26.59 kg/m2  SpO2 96%     Physical Exam       Constitutional:  Appropriately groomed, pleasant affect, well nourished, and in no acute distress.    HEENT:  Head: Atraumatic, normocephalic.    Ears:  Hearing grossly intact to voice.    Eyes:  Conjunctivae clear, sclera white, and medial canthus without exudate bilaterally.      Lungs:  Lungs with normal respiratory excursion and effort.      Left Knee:  No swelling, erythema, or ecchymosis present.  No ttp across the joint line.  Knee stable with varus and valgus stress.  Tenderness over the posterior aspect of the knee.    ROM: Extension 0, Flexion 90.   Patella tracking without crepitus.   No calf tenderness or clinical evidence of a DVT.      Motor Examination: Quad/hamstring 5/5 without atrophy.     Special Tests: Negative straight leg raise.  Negative bilateral knee valgus and varus stress.  Sorry positive McMurrays within rotation of the foot.      Sensation equal bilaterally to light touch for L4, L5, and S1.      NVS intact, DP 2+.  Capillary refill <2 seconds.      Gait and station wnl, " "non antalgic.    Derm:  No rashes or lesions with good turgor pressure.     Psychiatric:  Normal judgement, mood and affect.    8/17/2017 10:42 AM    HISTORY/REASON FOR EXAM:  Atraumatic Pain/Swelling/Deformity      TECHNIQUE/EXAM DESCRIPTION AND NUMBER OF VIEWS:  3 views of the LEFT knee.    COMPARISON: None    FINDINGS:  There is no evidence of fracture or dislocation. Alignment is maintained. No joint effusion is identified. There is minimal spurring of the superior aspect of the patella.           Impression        No evidence of acute fracture or dislocation.         Reading Provider Reading Date     Meagan Carrillo M.D. Aug 17, 2017         Signing Provider Signing Date Signing Time     Meagan Carrillo M.D. Aug 17, 2017 10:44 AM        Assessment/Plan:     1. Instability of knee joint, left     2. Acute pain of left knee  CANCELED: DX-KNEE COMPLETE 4+ LEFT   3. Posterior knee pain, left        Patient presents with left knee pain ×1 week with worsening 4 hours ago due to a turning mechanism of injury. Patient states she's felt a snap in the posterior aspect of her knee and now has point tenderness over this area. No radiation of pain or involvement of the hamstrings. Concern for soft tissue injury such as meniscal tear, posterior horn. On exam patien does not have laxity but does have instability with standing. She feels that her knee \"gives out\". X-ray reviewed by me and with patient shows no evidence of fracture but does show medial joint space narrowing, however x-ray was not done standing. Plan to place patient in a knee immobilizer and recommended rice protocol. Referred to sports medicine for further evaluation. Patient may require MRI if not improving to evaluate for meniscal tear. Work note provided.    Patient was in agreement with this treatment plan and seemed to understand without barriers. All questions were encouraged and answered.  Reviewed signs and symptoms of when to seek emergency " medical care.     Please note that this dictation was created using voice recognition software.  I have made every reasonable attempt to correct obvious errors, but I expect there are errors of nabil and possibly content that I did not discover before finalizing the note.

## 2017-08-17 NOTE — Clinical Note
August 17, 2017         Patient: Alton Childs   YOB: 1945   Date of Visit: 8/17/2017           To Whom it May Concern:    Alton Childs was seen in my clinic on 8/17/2017. Please excuse her from work 8/17-8/20.    If you have any questions or concerns, please don't hesitate to call.        Sincerely,           Kuldip Anderson PA-C  Electronically Signed

## 2017-08-18 ENCOUNTER — OFFICE VISIT (OUTPATIENT)
Dept: MEDICAL GROUP | Facility: CLINIC | Age: 72
End: 2017-08-18
Payer: COMMERCIAL

## 2017-08-18 VITALS
HEIGHT: 64 IN | WEIGHT: 155 LBS | OXYGEN SATURATION: 93 % | DIASTOLIC BLOOD PRESSURE: 72 MMHG | TEMPERATURE: 98.7 F | BODY MASS INDEX: 26.46 KG/M2 | HEART RATE: 72 BPM | SYSTOLIC BLOOD PRESSURE: 114 MMHG | RESPIRATION RATE: 16 BRPM

## 2017-08-18 DIAGNOSIS — M17.12 PRIMARY OSTEOARTHRITIS OF LEFT KNEE: ICD-10-CM

## 2017-08-18 PROCEDURE — 99214 OFFICE O/P EST MOD 30 MIN: CPT | Performed by: FAMILY MEDICINE

## 2017-08-18 ASSESSMENT — PATIENT HEALTH QUESTIONNAIRE - PHQ9: CLINICAL INTERPRETATION OF PHQ2 SCORE: 0

## 2017-08-18 NOTE — PROGRESS NOTES
"Subjective:      Alton Childs is a 72 y.o. female who presents with Knee Pain      Alton Childs female presenting at the request of  Kuldip Anderson PA-C for evaluation of knee pain.     Knee Pain      Alton Childs is complaining of left knee pain  present for approximately 2 days  Pain is at the posterior knee  About 5 days ago, was dancing at a family wedding, no known sudden pain  2-3 days later felt slow onset of pain  Quality is sharp  Pain is non-radiating   Improved with resting, straightening the knee  Aggravated by standing, walking  no prior problems with this area in the past   Prior Treatments: None  Prior studies: X-Ray   Medications tried for pain include: ibuprofen (OTC), did not help  Mechanical Symptom history: No Locking    PAST MEDICAL HISTORY:   History reviewed. No pertinent past medical history.    PMH:  has a past medical history of HTN (hypertension), benign (4/22/2011); Hypothyroid (4/22/2011); Arthritis; and Heart valve disease.  MEDS:   Current outpatient prescriptions:   •  losartan (COZAAR) 100 MG Tab, TAKE 1 TABLET BY MOUTH ONCE DAILY *GENERIC FOR COZAAR, Disp: 30 Tab, Rfl: 11  •  levothyroxine (SYNTHROID) 100 MCG Tab, TAKE 1 TABLET BY MOUTH EVERY MORNING ON AN EMPTY STOMACH, Disp: 30 Tab, Rfl: 11  •  hydrochlorothiazide (HYDRODIURIL) 25 MG Tab, Take 1 Tab by mouth every day., Disp: 90 Tab, Rfl: 3  •  ranitidine (ZANTAC) 150 MG Tab, Take 150 mg by mouth 2 times a day., Disp: , Rfl:   •  magnesium gluconate (MAG-G) 500 MG tablet, Take 1,000 mg by mouth 3 times a day. Indications: 1000mg daily, Disp: , Rfl:   •  Cholecalciferol (VITAMIN D) 2000 UNIT TABS, Take 2,000 Tabs by mouth every day., Disp: 30 Each, Rfl: 6  •  Multiple Vitamin (MULTI-VITAMIN PO), Take  by mouth every day., Disp: , Rfl:   ALLERGIES:   Allergies   Allergen Reactions   • Penicillins      'NASTY\"     SURGHX:   Past Surgical History   Procedure Laterality Date   • Cholecystectomy     • " "Inj,epidural,cerv/thor single  6/1/2012     Performed by ORLY ORTA at [facility]   • Gyn surgery       hysterectomy   • Other orthopedic surgery       kaur bunionectomy   • Other       T&A   • Cervical fusion posterior  7/18/2012     Performed by LA NENA JENNINGS at [facility]   • Cervical decompression posterior  7/18/2012     Performed by LA NENA JENNINGS at [facility]   • Cervical foraminotomy  7/18/2012     Performed by LA NENA JENNINGS at [facility]     SOCHX:  reports that she quit smoking about 24 years ago. Her smoking use included Cigarettes. She has a 15 pack-year smoking history. She has never used smokeless tobacco. She reports that she drinks alcohol. She reports that she does not use illicit drugs.  FH: Family history was reviewed, no pertinent findings to report     PHYSICAL EXAM:  /72 mmHg  Pulse 72  Temp(Src) 37.1 °C (98.7 °F)  Resp 16  Ht 1.626 m (5' 4.02\")  Wt 70.308 kg (155 lb)  BMI 26.59 kg/m2  SpO2 93%        ROS   No Nausea, No Vomiting, No Chest Pain, No Shortness of Breath, No Dizziness, No Headache       Objective:     /72 mmHg  Pulse 72  Temp(Src) 37.1 °C (98.7 °F)  Resp 16  Ht 1.626 m (5' 4.02\")  Wt 70.308 kg (155 lb)  BMI 26.59 kg/m2  SpO2 93%     Physical Exam         well-developed, well-nourished in no apparent distress, alert and oriented x 3.  Gait: antalgic     RIGHT Knee:  Slight Varus and No Swelling  Range of Motion Intact  Trace effusion  Patellar No tenderness and no apprehension  Medial Joint Line Non-tender and NEGATIVE Sin  Lateral Joint Line Non-tender and NEGATIVE Sin  Trace Laxity with Varus stress  Trace Laxity with Valgus stress  Lachman's testing is Trace  Posterior Drawer Testing is Trace  The leg is otherwise neurovascularly intact    LEFT Knee:  Slight Varus and Swelling    Range of Motion Slightly limited with Flexion , pain at extreme flexion   1+ effusion  Patellar No " tenderness and no apprehension  Medial Joint Line Tenderness and NEGATIVE Sin  Lateral Joint Line Non-tender and NEGATIVE Sin  Trace Laxity with Varus stress  Trace Laxity with Valgus stress  Lachman's testing is Trace  Posterior Drawer Testing is Trace  The leg is otherwise neurovascularly intact    Additional Findings: Tight hamstrings     Assessment/Plan:     1. Primary osteoarthritis of left knee       Continue ibuprofen for the next 5 days with food  Activity as tolerated  Continue knee brace since it is helping     Return in about 2 weeks (around 8/31/2017).  Consider corticosteroid knee injection if sxs persist               Results for orders placed in visit on 08/17/17   DX-KNEE 3 VIEWS LEFT    Impression No evidence of acute fracture or dislocation.                                              taken at  and reviewed by me    Thank you Kuldip Anderson PA-C for allowing me to participate in caring for your patient.

## 2017-08-18 NOTE — MR AVS SNAPSHOT
"        Alton Childs   2017 10:30 AM   Office Visit   MRN: [id number]    Department:  [facility]   Dept Phone:  [phone]    Description:  Female : 1945   Provider:  Jonny Crawford M.D.           Reason for Visit     Knee Pain Referral from [facility]/ L knee pain       Allergies as of 2017     Allergen Noted Reactions    Penicillins 2012       'NASTY\"      You were diagnosed with     Primary osteoarthritis of left knee   [[id number]]         Vital Signs     Blood Pressure Pulse Temperature Respirations Height Weight    114/72 mmHg 72 37.1 °C (98.7 °F) 16 1.626 m (5' 4.02\") 70.308 kg (155 lb)    Body Mass Index Oxygen Saturation Smoking Status             26.59 kg/m2 93% Former Smoker         Basic Information     Date Of Birth Sex Race Ethnicity Preferred Language    1945 Female White Non- English      Your appointments     Aug 31, 2017  8:15 AM   Established Patient with Jonny Crawford M.D.   [facility] ([facility]    [address] [facility] [address]   [phone]           You will be receiving a confirmation call a few days before your appointment from our automated call confirmation system.              Problem List              ICD-10-CM Priority Class Noted - Resolved    HTN (hypertension), benign (Chronic) I10   2011 - Present    DJD (degenerative joint disease) of cervical spine M47.812   2012 - Present    Dyslipidemia E78.5   10/25/2012 - Present    Acquired hypothyroidism E03.9   2015 - Present    Tinnitus H93.19   2015 - Present    Family history of breast cancer Z80.3   2016 - Present      Health Maintenance        Date Due Completion Dates    IMM DTaP/Tdap/Td Vaccine (1 - Tdap) 1964 ---    IMM ZOSTER VACCINE 2005 ---    BONE DENSITY 2010 ---    COLON CANCER SCREENING ANNUAL FIT 2015    IMM INFLUENZA (1) 2017 ---    MAMMOGRAM 2018            "   Current Immunizations     13-VALENT PCV PREVNAR 4/26/2016    Pneumococcal polysaccharide vaccine (PPSV-23) 7/19/2012  9:30 AM      Below and/or attached are the medications your provider expects you to take. Review all of your home medications and newly ordered medications with your provider and/or pharmacist. Follow medication instructions as directed by your provider and/or pharmacist. Please keep your medication list with you and share with your provider. Update the information when medications are discontinued, doses are changed, or new medications (including over-the-counter products) are added; and carry medication information at all times in the event of emergency situations     Allergies:  PENICILLINS - (reactions not documented)               Medications  Valid as of: August 18, 2017 - 11:58 AM    Generic Name Brand Name Tablet Size Instructions for use    Cholecalciferol (Tab) vitamin D 2000 UNIT Take 2,000 Tabs by mouth every day.        HydroCHLOROthiazide (Tab) HYDRODIURIL 25 MG Take 1 Tab by mouth every day.        Levothyroxine Sodium (Tab) SYNTHROID 100 MCG TAKE 1 TABLET BY MOUTH EVERY MORNING ON AN EMPTY STOMACH        Losartan Potassium (Tab) COZAAR 100 MG TAKE 1 TABLET BY MOUTH ONCE DAILY *GENERIC FOR COZAAR        Magnesium Gluconate (Tab) MAG-G 500 MG Take 1,000 mg by mouth 3 times a day. Indications: 1000mg daily        Multiple Vitamin   Take  by mouth every day.        RaNITidine HCl (Tab) ZANTAC 150 MG Take 150 mg by mouth 2 times a day.        .                 Medicines prescribed today were sent to:     [id number] - [place] - [address]    [address] [place] [address]    Phone: [phone] Fax: [phone]    Open 24 Hours?: No      Medication refill instructions:       If your prescription bottle indicates you have medication refills left, it is not necessary to call your provider’s office. Please contact your pharmacy and they will refill your medication.    If your  prescription bottle indicates you do not have any refills left, you may request refills at any time through one of the following ways: The online [employer] system (except Urgent Care), by calling your provider’s office, or by asking your pharmacy to contact your provider’s office with a refill request. Medication refills are processed only during regular business hours and may not be available until the next business day. Your provider may request additional information or to have a follow-up visit with you prior to refilling your medication.   *Please Note: Medication refills are assigned a new Rx number when refilled electronically. Your pharmacy may indicate that no refills were authorized even though a new prescription for the same medication is available at the pharmacy. Please request the medicine by name with the pharmacy before contacting your provider for a refill.           [employer] Access Code: Activation code not generated  Current [employer] Status: Active

## 2017-08-18 NOTE — Clinical Note
August 18, 2017         Patient: Alton Childs   YOB: 1945   Date of Visit: 8/18/2017           To Whom it May Concern:    Alton Childs was seen in my clinic on 8/18/2017. She should avoid standing for more than half of her shift until re-evaluation in 10 days.    If you have any questions or concerns, please don't hesitate to call.        Sincerely,           Jonny Crawford M.D.  Electronically Signed

## 2017-09-07 ENCOUNTER — APPOINTMENT (OUTPATIENT)
Dept: URGENT CARE | Facility: CLINIC | Age: 72
End: 2017-09-07
Payer: COMMERCIAL

## 2017-09-07 ENCOUNTER — OFFICE VISIT (OUTPATIENT)
Dept: MEDICAL GROUP | Facility: CLINIC | Age: 72
End: 2017-09-07
Payer: COMMERCIAL

## 2017-09-07 VITALS
HEIGHT: 64 IN | OXYGEN SATURATION: 92 % | RESPIRATION RATE: 16 BRPM | HEART RATE: 67 BPM | DIASTOLIC BLOOD PRESSURE: 76 MMHG | WEIGHT: 155 LBS | TEMPERATURE: 98.7 F | BODY MASS INDEX: 26.46 KG/M2 | SYSTOLIC BLOOD PRESSURE: 118 MMHG

## 2017-09-07 DIAGNOSIS — M25.562 LEFT MEDIAL KNEE PAIN: ICD-10-CM

## 2017-09-07 DIAGNOSIS — M17.12 PRIMARY OSTEOARTHRITIS OF LEFT KNEE: ICD-10-CM

## 2017-09-07 PROCEDURE — 20610 DRAIN/INJ JOINT/BURSA W/O US: CPT | Performed by: FAMILY MEDICINE

## 2017-09-07 PROCEDURE — 99213 OFFICE O/P EST LOW 20 MIN: CPT | Mod: 25 | Performed by: FAMILY MEDICINE

## 2017-09-07 RX ORDER — TRIAMCINOLONE ACETONIDE 40 MG/ML
40 INJECTION, SUSPENSION INTRA-ARTICULAR; INTRAMUSCULAR ONCE
Status: COMPLETED | OUTPATIENT
Start: 2017-09-07 | End: 2017-09-07

## 2017-09-07 RX ADMIN — TRIAMCINOLONE ACETONIDE 40 MG: 40 INJECTION, SUSPENSION INTRA-ARTICULAR; INTRAMUSCULAR at 11:46

## 2017-09-07 NOTE — PROGRESS NOTES
"Subjective:      Alton Childs is a 72 y.o. female who presents with Follow-Up (F/V L knee pain )    Follow-up for LEFT knee pain.     Knee Pain      Alton Childs is complaining of continued left knee pain  Neurologic her having posterior pain, now having pain mostly to medial aspect of the LEFT knee  2-3 days later felt slow onset of pain  Quality is sharp  Pain is non-radiating   Improved with resting, straightening the knee  Aggravated by standing, walking  no prior problems with this area in the past   Prior Treatments: None  Prior studies: X-Ray   Medications tried for pain include: ibuprofen (OTC), did not help  Mechanical Symptom history: No Locking    PAST MEDICAL HISTORY:   History reviewed. No pertinent past medical history.    PMH:  has a past medical history of Arthritis; Heart valve disease; HTN (hypertension), benign (4/22/2011); and Hypothyroid (4/22/2011).  MEDS:   Current Outpatient Prescriptions:   •  losartan (COZAAR) 100 MG Tab, TAKE 1 TABLET BY MOUTH ONCE DAILY *GENERIC FOR COZAAR, Disp: 30 Tab, Rfl: 11  •  levothyroxine (SYNTHROID) 100 MCG Tab, TAKE 1 TABLET BY MOUTH EVERY MORNING ON AN EMPTY STOMACH, Disp: 30 Tab, Rfl: 11  •  hydrochlorothiazide (HYDRODIURIL) 25 MG Tab, Take 1 Tab by mouth every day., Disp: 90 Tab, Rfl: 3  •  ranitidine (ZANTAC) 150 MG Tab, Take 150 mg by mouth 2 times a day., Disp: , Rfl:   •  magnesium gluconate (MAG-G) 500 MG tablet, Take 1,000 mg by mouth 3 times a day. Indications: 1000mg daily, Disp: , Rfl:   •  Cholecalciferol (VITAMIN D) 2000 UNIT TABS, Take 2,000 Tabs by mouth every day., Disp: 30 Each, Rfl: 6  •  Multiple Vitamin (MULTI-VITAMIN PO), Take  by mouth every day., Disp: , Rfl:     Current Facility-Administered Medications:   •  triamcinolone acetonide (KENALOG-40) injection 40 mg, 40 mg, Intra-articular, Once, Jonny Crawford M.D.  ALLERGIES:   Allergies   Allergen Reactions   • Penicillins      'NASTY\"     SURGHX:   Past Surgical " "History:   Procedure Laterality Date   • CERVICAL FUSION POSTERIOR  7/18/2012    Performed by LA NENA JENNINGS at [facility]   • CERVICAL DECOMPRESSION POSTERIOR  7/18/2012    Performed by LA NENA JENNINGS at [facility]   • CERVICAL FORAMINOTOMY  7/18/2012    Performed by LA NENA JENNINGS at [facility]   • INJ,EPIDURAL,CERV/THOR SINGLE  6/1/2012    Performed by ORLY ORTA at [facility]   • CHOLECYSTECTOMY     • GYN SURGERY      hysterectomy   • OTHER      T&A   • OTHER ORTHOPEDIC SURGERY      kaur bunionectomy     SOCHX:  reports that she quit smoking about 24 years ago. Her smoking use included Cigarettes. She has a 15.00 pack-year smoking history. She has never used smokeless tobacco. She reports that she drinks alcohol. She reports that she does not use drugs.  FH: Family history was reviewed, no pertinent findings to report     PHYSICAL EXAM:  /76   Pulse 67   Temp 37.1 °C (98.7 °F)   Resp 16   Ht 1.626 m (5' 4.02\")   Wt 70.3 kg (155 lb)   SpO2 92%   BMI 26.59 kg/m²     ROS   No Nausea, No Vomiting, No Chest Pain, No Shortness of Breath, No Dizziness, No Headache       Objective:     /76   Pulse 67   Temp 37.1 °C (98.7 °F)   Resp 16   Ht 1.626 m (5' 4.02\")   Wt 70.3 kg (155 lb)   SpO2 92%   BMI 26.59 kg/m²      Physical Exam         well-developed, well-nourished in no apparent distress, alert and oriented x 3.  Gait: antalgic     RIGHT Knee:  Slight Varus and No Swelling  Range of Motion Intact  Trace effusion  Patellar No tenderness and no apprehension  Medial Joint Line Non-tender and NEGATIVE Sin  Lateral Joint Line Non-tender and NEGATIVE Sin  Trace Laxity with Varus stress  Trace Laxity with Valgus stress  Lachman's testing is Trace  Posterior Drawer Testing is Trace  The leg is otherwise neurovascularly intact    LEFT Knee:  Slight Varus and NO swelling  Range of Motion Slightly limited with Flexion , pain at " extreme flexion   Trace effusion  Patellar No tenderness and no apprehension  POSITIVE Medial Joint Line Tenderness and NEGATIVE Sin  Lateral Joint Line Non-tender and NEGATIVE Sin  Trace Laxity with Varus stress  Trace Laxity with Valgus stress  Lachman's testing is Trace  Posterior Drawer Testing is Trace  The leg is otherwise neurovascularly intact    Additional Findings: Tight hamstrings     Assessment/Plan:     1. Primary osteoarthritis of left knee  triamcinolone acetonide (KENALOG-40) injection 40 mg   2. Left medial knee pain       Continue ibuprofenWhen necessary  Activity as tolerated  Provided note for work to avoid standing for greater than 15 minutes out of every hour  Continue knee brace    Return in about 3 weeks (around 9/28/2017).    corticosteroid knee injection performed in the office today    PROCEDURE NOTE:  left knee corticosteroid injection  Consent was obtained, using sterile technique the left knee was prepped and 5 ml's of 2% Marcaine  Infra-lateral patellar approach.   Steroid kenalog 40 mg and 5 ml plain Lidocaine was then injected and the needle withdrawn.  The procedure was well tolerated.    Watch for fever, or increased swelling or persistent pain in knee. Call or return to clinic prn if such symptoms occur or the knee fails to improve as anticipated.             Results for orders placed in visit on 08/17/17   DX-KNEE 3 VIEWS LEFT    Impression No evidence of acute fracture or dislocation.                                                Thank you Kuldip Anderson PA-C for allowing me to participate in caring for your patient.

## 2017-09-07 NOTE — LETTER
September 7, 2017         Patient: Alton Childs   YOB: 1945   Date of Visit: 9/7/2017           To Whom it May Concern:    Alton Childs was seen in my clinic on 9/7/2017. She may return to work and recommend avoiding standing for more than 15 minutes out of every hour.    If you have any questions or concerns, please don't hesitate to call.        Sincerely,           Jonny Crawford M.D.  Electronically Signed

## 2017-11-06 ENCOUNTER — HOSPITAL ENCOUNTER (OUTPATIENT)
Dept: RADIOLOGY | Facility: MEDICAL CENTER | Age: 72
End: 2017-11-06
Attending: PHYSICIAN ASSISTANT
Payer: COMMERCIAL

## 2017-11-06 ENCOUNTER — APPOINTMENT (OUTPATIENT)
Dept: RADIOLOGY | Facility: IMAGING CENTER | Age: 72
End: 2017-11-06
Attending: PHYSICIAN ASSISTANT
Payer: COMMERCIAL

## 2017-11-06 ENCOUNTER — OCCUPATIONAL MEDICINE (OUTPATIENT)
Dept: URGENT CARE | Facility: CLINIC | Age: 72
End: 2017-11-06
Payer: COMMERCIAL

## 2017-11-06 VITALS
SYSTOLIC BLOOD PRESSURE: 148 MMHG | WEIGHT: 152 LBS | HEIGHT: 63 IN | BODY MASS INDEX: 26.93 KG/M2 | HEART RATE: 61 BPM | RESPIRATION RATE: 20 BRPM | OXYGEN SATURATION: 93 % | DIASTOLIC BLOOD PRESSURE: 70 MMHG | TEMPERATURE: 99.1 F

## 2017-11-06 DIAGNOSIS — S09.90XA INJURY OF HEAD, INITIAL ENCOUNTER: ICD-10-CM

## 2017-11-06 DIAGNOSIS — S46.911A STRAIN OF RIGHT SHOULDER, INITIAL ENCOUNTER: ICD-10-CM

## 2017-11-06 DIAGNOSIS — S16.1XXA STRAIN OF NECK MUSCLE, INITIAL ENCOUNTER: ICD-10-CM

## 2017-11-06 PROCEDURE — 99214 OFFICE O/P EST MOD 30 MIN: CPT | Mod: 29 | Performed by: PHYSICIAN ASSISTANT

## 2017-11-06 PROCEDURE — 73030 X-RAY EXAM OF SHOULDER: CPT | Mod: TC,RT,29 | Performed by: PHYSICIAN ASSISTANT

## 2017-11-06 PROCEDURE — 72040 X-RAY EXAM NECK SPINE 2-3 VW: CPT | Mod: TC,29 | Performed by: PHYSICIAN ASSISTANT

## 2017-11-06 PROCEDURE — 70450 CT HEAD/BRAIN W/O DYE: CPT

## 2017-11-06 NOTE — LETTER
[facility]  [address] [place] [address]  Phone:  [phone] - Fax:  [phone]   [facility] Progress Report and Disability Certification  Date of Service: 11/6/2017   No Show:  No  Date / Time of Next Visit: 11/10/2017 2:00 PM   Claim Information   Patient Name: Alton Childs  Claim Number:     Employer:   [employer] Date of Injury: 11/6/2017     Insurer / TPA: Maryjane Paez  ID / SSN:     Occupation:   Diagnosis: Diagnoses of Injury of head, initial encounter, Strain of right shoulder, initial encounter, and Strain of neck muscle, initial encounter were pertinent to this visit.    Medical Information   Related to Industrial Injury? 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 Pt was alone, has poor recollection of events, pt here w/ WC rep who states she is 'not thinking straight', first memory was when co worker found, c/o some nasuea, denies emesis since injury. Notes some HA, c/o pain to right shoulder, notes PMH of neck surgery, c/o some pain to right shoulder and neck, denies radiation of pain, notes had some tingling to right hand that has not resolved. Denies any other current employment, denies PMH of surgery to right shoulder. PMH of cervical spine surgery around 7yrs.    Objective Findings: Gen: AOx3; Head: NC, no ecchymosis/edema, scalp intact w/ no clear location of impact; Eyes: PERRLA/EOM; Chest/Lungs: CTAB, denies pain to ribs, no focal ttp, no bony ttp to chest; Cardiac: RRR no MRG; Neck: mild edema to right paraspinal musculature and onto right trap/palpable spasm and ttp to area, skin intact, evident healed surgical scar over cervical spine, no bony ttp, full AROM neck; Right shoulder: no  "effusion/ erythema/ecchymosis, full AROM (slow but able) RC 4/5 in FF/ABD, 5/5 in ER/IR, mild ACJ ttp, mild mid clavicle ttp, no bony step off or focal pain to clavicle; Neuro: NVID to UE/LE, DTR's +2=, CN 2-12 normal, pt w/ slowed responses, c/o unsteady gait/poor balance, c/o feeling \"in a cloud and forgetful since injury\", gait is slow and w/ assistance     Pre-Existing Condition(s):     Assessment:   Initial Visit    Status: Additional Care Required  Permanent Disability:No    Plan:   Comments:restricted duty w/ 25# weight restriction, no climibing/stooping/bending, OTC tylenol, f/u on friday or sooner      Diagnostics: X-rayCT  Comments:NEG    Comments:       Disability Information   Status: Released to Restricted Duty    From:  2017  Through: 11/10/2017 Restrictions are: Temporary   Physical Restrictions   Sitting:    Standing:    [place] hrs/day [place] hrs/day   Squatting:    Walking:    Climbin hrs/day Pushing:      Pulling:    Other:    Reaching Above Shoulder (L):   Reaching Above Shoulder (R):       Reaching Below Shoulder (L):    Reaching Below Shoulder (R):      Not to exceed Weight Limits   Carrying(hrs):   Weight Limit(lb): < or = to 25 pounds Lifting(hrs):   Weight  Limit(lb): < or = to 25 pounds   Comments: restricted duty w/ 25# weight restriction, no climibing/stooping/bending, OTC ibu, f/u on friday or sooner    Repetitive Actions   Hands: i.e. Fine Manipulations from Grasping:     Feet: i.e. Operating Foot Controls:     Driving / Operate Machinery:     Physician Name: Jassi Taylor P.A.-C. Physician Signature: JASSI Esparza P.A.-C. e-Signature: Dr. Mikhail Fong, Medical Director   Clinic Name / Location: [facility]  [address] [place] [id number] Clinic Phone Number: Dept: [phone]   Appointment Time: 11:15 Am Visit Start Time: 11:26 AM   Check-In Time:  11:17 Am Visit Discharge Time: 6:50 PM   Original-Treating Physician or " Chiropractor    Page 2-Insurer/TPA    Page 3-Employer    Page 4-Employee

## 2017-11-06 NOTE — PROGRESS NOTES
"Subjective:      Alton Childs is a 72 y.o. 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 Pt was alone, has poor recollection of events, pt here w/ WC rep who states she is 'not thinking straight', first memory was when co worker found, c/o some nasuea, denies emesis since injury. Notes some HA, c/o pain to right shoulder, notes PMH of neck surgery, c/o some pain to right shoulder and neck, denies radiation of pain, notes had some tingling to right hand that has not resolved. Denies any other current employment, denies PMH of surgery to right shoulder. PMH of cervical spine surgery around 7yrs.      HPI    ROS       Objective:     /70   Pulse 61   Temp 37.3 °C (99.1 °F)   Resp 20   Ht 1.6 m (5' 3\")   Wt 68.9 kg (152 lb)   SpO2 93%   BMI 26.93 kg/m²      Physical Exam    Gen: AOx3; Head: NC, no ecchymosis/edema, scalp intact w/ no clear location of impact; Eyes: PERRLA/EOM; Chest/Lungs: CTAB, denies pain to ribs, no focal ttp, no bony ttp to chest; Cardiac: RRR no MRG; Neck: mild edema to right paraspinal musculature and onto right trap/palpable spasm and ttp to area, skin intact, evident healed surgical scar over cervical spine, no bony ttp, full AROM neck; Right shoulder: no effusion/ erythema/ecchymosis, full AROM (slow but able) RC 4/5 in FF/ABD, 5/5 in ER/IR, mild ACJ ttp, mild mid clavicle ttp, no bony step off or focal pain to clavicle; Neuro: NVID to UE/LE, DTR's +2=, CN 2-12 normal, pt w/ slowed responses, c/o unsteady gait/poor balance, c/o feeling \"in a cloud and forgetful since injury\", gait is slow and w/ assistance    Dx shoulder- Impression       Negative for right shoulder fracture or malalignment   Reading Provider " Reading Date   Sabas Thomas M.D. Nov 6, 2017   Signing Provider Signing Date Signing Time   Sabas Thomas M.D. Nov 6, 2017 12:20 PM         Dx cervical - Impression       1.  Multilevel degenerative change of cervical spine.  2.  Prior posterior fusion at C3-4.  3.  No acute fracture or subluxation.   Reading Provider Reading Date   John Garnett M.D. Nov 6, 2017   Signing Provider Signing Date Signing Time   John Garnett M.D. Nov 6, 2017 12:28 PM         CT head - Impression       No acute intracranial abnormality.   Reading Provider Reading Date   John Garnett M.D. Nov 6, 2017   Signing Provider Signing Date Signing Time   John Garnett M.D. Nov 6, 2017  5:43 PM          Assessment/Plan:     1. Injury of head, initial encounter  restricted duty w/ 25# weight restriction, no climibing/stooping/bending, OTC ibu, f/u on friday or sooner  - CT-HEAD W/O; Future    2. Strain of right shoulder, initial encounter    - DX-SHOULDER 2+ RIGHT; Future    3. Strain of neck muscle, initial encounter    - DX-CERVICAL SPINE-2 OR 3 VIEWS; Future

## 2017-11-06 NOTE — LETTER
"EMPLOYEE’S CLAIM FOR COMPENSATION/ REPORT OF INITIAL TREATMENT  FORM C-4    EMPLOYEE’S CLAIM - PROVIDE ALL INFORMATION REQUESTED   First Name  Alton Last Name  Naz Birthdate                    1945                Sex  female Claim Number   Home Address  [address] Age  72 y.o. Height  1.6 m (5' 3\") Weight  68.9 kg (152 lb) [facility]     [facility] Zip  [address] Telephone  [phone] (home)    Mailing Address  [address] [facility] Zip  [address] Primary Language Spoken  English    Insurer   Third Party   [employer]   Employee's Occupation (Job Title) When Injury or Occupational Disease Occurred      Employer's Name    [employer]      Employer Address    City    State    Zip      Date of Injury  11/6/2017               Hour of Injury  9:00 AM Date Employer Notified  11/6/2017 Last Day of Work after Injury or Occupational Disease  11/6/2017 Supervisor to Whom Injury Reported  Mervat López   Address or Location of Accident (if applicable)  [[address] [place] [address]]   What were 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 (Be specific an answer in detail. Use additional sheet if necessary)  In the freezer and products fell from above my head. hit my head and shoulders and chest area.   If you believe that you have an occupational disease, when did you first have knowledge of the disability and it relationship to your employment?  n/a Witnesses to the Accident  Zane      Nature of Injury or Occupational Disease  Workers' Compensation  Part(s) of Body Injured or Affected  Skull, Soft Tissue - Neck, Shoulder (R)    I certify that the above is true and correct to the best of my knowledge and that I have provided this information in order to obtain the benefits of [facility] Industrial Insurance " and Occupational Diseases Acts [place]  I hereby authorize any physician, chiropractor, surgeon, practitioner, or other person, any hospital, including [facility], any medical service organization, any insurance company, or other institution or organization to release to each other, any medical or other information, including benefits paid or payable, pertinent to this injury or disease, except information relative to diagnosis, treatment and/or counseling for AIDS, psychological conditions, alcohol or controlled substances, for which I must give specific authorization.  A Photostat of this authorization shall be as valid as the original.     Date 11/6/2017   Place [facility]   Employee’s Signature   THIS REPORT MUST BE COMPLETED AND MAILED WITHIN 3 WORKING DAYS OF TREATMENT   Place  [facility]  Name of Facility  [facility]   Date  11/6/2017 Diagnosis  (S09.90XA) Injury of head, initial encounter  (S46.911A) Strain of right shoulder, initial encounter  (S16.1XXA) Strain of neck muscle, initial encounter Is there evidence the injured employee was under the influence of alcohol and/or another controlled substance at the time of accident?   Hour  11:26 AM Description of Injury or Disease  Diagnoses of Injury of head, initial encounter, Strain of right shoulder, initial encounter, and Strain of neck muscle, initial encounter were pertinent to this visit. No   Treatment  restricted duty w/ 25# weight restriction, no climibing/stooping/bending, OTC ibu, f/u on friday or sooner  Have you advised the patient to remain off work five days or more? No   X-Ray Findings  Negative   If Yes   From Date  To Date      From information given by the employee, together with medical evidence, can you directly connect this injury or occupational disease as job incurred?  Yes If No Full Duty  No Modified Duty  Yes   Is additional  "medical care by a physician indicated?  Yes If Modified Duty, Specify any Limitations / Restrictions  restricted duty w/ 25# weight restriction, no climibing/stooping/bending, OTC ibu, f/u on friday or sooner   Do you know of any previous injury or disease contributing to this condition or occupational disease?                            No   Date  11/6/2017 Print Doctor’s Name Jassi Taylor P.A.-C. I certify the employer’s copy of  this form was mailed on:   Address  [address] Insurer’s Use Only     [facility] Zip  [address]    Provider’s Tax ID Number  [id number] Telephone  Dept: [phone]        e-JASSI Booth P.A.-C.   e-Signature: Dr. Mikhail Fong, Medical Director Degree  LUIS        ORIGINAL-TREATING PHYSICIAN OR CHIROPRACTOR    PAGE 2-INSURER/TPA    PAGE 3-EMPLOYER    PAGE 4-EMPLOYEE             Form C-4 (rev10/07)              BRIEF DESCRIPTION OF RIGHTS AND BENEFITS  (Pursuant to [place] 616C.050)    Notice of Injury or Occupational Disease (Incident Report Form C-1): If an injury or occupational disease (OD) arises out of and in the  course of employment, you must provide written notice to your employer as soon as practicable, but no later than 7 days after the accident or  OD. Your employer shall maintain a sufficient supply of the required forms.    Claim for Compensation (Form C-4): If medical treatment is sought, the form C-4 is available at the place of initial treatment. A completed  \"Claim for Compensation\" (Form C-4) must be filed within 90 days after an accident or OD. The treating physician or chiropractor must,  within 3 working days after treatment, complete and mail to the employer, the employer's insurer and third-party , the Claim for  Compensation.    Medical Treatment: If you require medical treatment for your on-the-job injury or OD, you may be required to select a physician or  chiropractor from a list provided by your workers’ " compensation insurer, if it has contracted with an Organization for Managed Care (MCO) or  Preferred Provider Organization (PPO) or providers of health care. If your employer has not entered into a contract with an MCO or PPO, you  may select a physician or chiropractor from the Panel of Physicians and Chiropractors. Any medical costs related to your industrial injury or  OD will be paid by your insurer.    Temporary Total Disability (TTD): If your doctor has certified that you are unable to work for a period of at least 5 consecutive days, or 5  cumulative days in a 20-day period, or places restrictions on you that your employer does not accommodate, you may be entitled to TTD  compensation.    Temporary Partial Disability (TPD): If the wage you receive upon reemployment is less than the compensation for TTD to which you are  entitled, the insurer may be required to pay you TPD compensation to make up the difference. TPD can only be paid for a maximum of 24  months.    Permanent Partial Disability (PPD): When your medical condition is stable and there is an indication of a PPD as a result of your injury or  OD, within 30 days, your insurer must arrange for an evaluation by a rating physician or chiropractor to determine the degree of your PPD. The  amount of your PPD award depends on the date of injury, the results of the PPD evaluation and your age and wage.    Permanent Total Disability (PTD): If you are medically certified by a treating physician or chiropractor as permanently and totally disabled  and have been granted a PTD status by your insurer, you are entitled to receive monthly benefits not to exceed 66 2/3% of your average  monthly wage. The amount of your PTD payments is subject to reduction if you previously received a PPD award.    Vocational Rehabilitation Services: You may be eligible for vocational rehabilitation services if you are unable to return to the job due to a  permanent physical  impairment or permanent restrictions as a result of your injury or occupational disease.    Transportation and Per Madalyn Reimbursement: You may be eligible for travel expenses and per madalyn associated with medical treatment.    Reopening: You may be able to reopen your claim if your condition worsens after claim closure.    Appeal Process: If you disagree with a written determination issued by the insurer or the insurer does not respond to your request, you may  appeal to the Department of Administration, , by following the instructions contained in your determination letter. You must  appeal the determination within 70 days from the date of the determination letter at [address], [address], [place], [place]  [address], or [address], [address], [place] [address]. If you disagree with the  decision, you may appeal to the  Department of Administration, . You must file your appeal within 30 days from the date of the  decision  letter at [address], [address], [place], [place] [address], or [address], [address], [place] [address]. If you  disagree with a decision of an , you may file a petition for judicial review with the District Court. You must do so within 30  days of the Appeal Officer’s decision. You may be represented by an  at your own expense or you may contact the [facility] for possible  representation.    [place]  for Injured Workers (NAIW): If you disagree with a  decision, you may request that NAIW represent you  without charge at an  Hearing. For information regarding denial of benefits, you may contact the [facility] at: [address], [address], [place] [address], [phone], or [address], [address] [address], [phone]    To File a Complaint with the Division: If you wish to file a complaint with  the  of the Division of Industrial Relations (DIR),  please contact the Workers’ Compensation Section, [address], [address], [place], [place] [address], telephone [phone], or  [address], [address], [place], [place] [address], telephone [phone].    For assistance with Workers’ Compensation Issues: you may contact the Office of the Governor Consumer Health Assistance, [address] [place] [address], [address], [place], [place] [address], Toll Free 1-760.507.3835, Web site: [email or web address]., E-mail  [email or web address].[email or web address].                                                                                                                                                                                                                                   __________________________________________________________________                                                                   _________________                Employee Name / Signature                                                                                                                                                       Date                                                                                                                                                                                                     D-2 (rev. 10/07)

## 2017-11-09 ENCOUNTER — TELEPHONE (OUTPATIENT)
Dept: URGENT CARE | Facility: CLINIC | Age: 72
End: 2017-11-09

## 2017-11-10 ENCOUNTER — OCCUPATIONAL MEDICINE (OUTPATIENT)
Dept: URGENT CARE | Facility: CLINIC | Age: 72
End: 2017-11-10
Payer: COMMERCIAL

## 2017-11-10 VITALS
SYSTOLIC BLOOD PRESSURE: 116 MMHG | TEMPERATURE: 97.7 F | RESPIRATION RATE: 16 BRPM | OXYGEN SATURATION: 93 % | BODY MASS INDEX: 26.93 KG/M2 | WEIGHT: 152 LBS | DIASTOLIC BLOOD PRESSURE: 86 MMHG | HEART RATE: 62 BPM | HEIGHT: 63 IN

## 2017-11-10 DIAGNOSIS — R11.0 NAUSEA: ICD-10-CM

## 2017-11-10 DIAGNOSIS — S06.0X0D CONCUSSION WITHOUT LOSS OF CONSCIOUSNESS, SUBSEQUENT ENCOUNTER: ICD-10-CM

## 2017-11-10 PROCEDURE — 99214 OFFICE O/P EST MOD 30 MIN: CPT | Performed by: FAMILY MEDICINE

## 2017-11-10 RX ORDER — ONDANSETRON 4 MG/1
4 TABLET, ORALLY DISINTEGRATING ORAL EVERY 8 HOURS PRN
Qty: 10 TAB | Refills: 0 | Status: SHIPPED | OUTPATIENT
Start: 2017-11-10 | End: 2019-01-24

## 2017-11-10 NOTE — LETTER
[facility]  [address] [place] [address]  Phone:  [phone] - Fax:  [phone]   [facility] Progress Report and Disability Certification  Date of Service: 11/10/2017   No Show:  No  Date / Time of Next Visit: 11/16/2017 2:00PM   Claim Information   Patient Name: Alton Childs  Claim Number:     Employer:   [employer] Date of Injury: 11/6/2017     Insurer / TPA: Maryjane Paez  ID / SSN:     Occupation:   Diagnosis: Diagnoses of Concussion without loss of consciousness, subsequent encounter and Nausea were pertinent to this visit.    Medical Information   Related to Industrial Injury? 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   She was seen in  and imaging showed no fractures, CT head showed no acute changes       She continues to have some minor headaches and neck pain, but controlled with motrin.         She also has been having some persistent nausea, but no emeisis     Objective Findings: Neurologic: Alert and oriented. Cranial nerves II-XII intact, EOMs intact, no tongue deviation, PERRL, no facial asymmetry to motor or sensation, symmetric palate, normal finger-to-nose test, no pronator drift. No focal motor deficits. Symmetric reflexes. Normal station and gait, normal tandem walk.     c-spine - full AROM.   No tenderness.   Head: Normocephalic.  No bony step-off or scalp hematoma  No periorbital ecchymosis,    hemotympanum , cerebrospinal fluid  otorrhea, or CSF rhinorrhea No bony step-off.  Harry's sign negative  Skin: Skin is warm. She is not diaphoretic. No erythema.   Psychiatric:  behavior is normal.   Speech - normal rate and clear.  Thought process is linear and goal-directed.    Pre-Existing  Condition(s):     Assessment:   Condition Improved    Status: Additional Care Required  Permanent Disability:No    Plan: Medication    Diagnostics:      Comments:       Disability Information   Status: Released to Restricted Duty    From:  11/10/2017  Through: 2017 Restrictions are: Temporary   Physical Restrictions   Sitting:    Standing:    [place] hrs/day [place] hrs/day   Squatting:    Walking:    Climbing:    Pushing:      Pulling:    Other:    Reaching Above Shoulder (L):   Reaching Above Shoulder (R):       Reaching Below Shoulder (L):    Reaching Below Shoulder (R):      Not to exceed Weight Limits   Carrying(hrs):   Weight Limit(lb): < or = to 10 pounds Lifting(hrs):   Weight  Limit(lb): < or = to 10 pounds   Comments:  1. Concussion without loss of consciousness, subsequent encounter  Improving.   Continue work restrictions  F/u 5-7 d    2. Nausea   -secondary to #1  - ondansetron (ZOFRAN ODT) 4 MG TABLET DISPERSIBLE; Take 1 Tab by mouth every 8 hours as needed.  Dispense: 10 Tab; Refill: 0        Repetitive Actions   Hands: i.e. Fine Manipulations from Grasping:     Feet: i.e. Operating Foot Controls:     Driving / Operate Machinery:     Physician Name: Oli Acevedo M.D. Physician Signature: OLI Winston M.D. e-Signature: Dr. Mikhail Fong, Medical Director   Clinic Name / Location: [facility]  [address] [place] [address] Clinic Phone Number: Dept: [phone]   Appointment Time: 2:00 Pm Visit Start Time: 1:51 PM   Check-In Time:  1:46 Pm Visit Discharge Time: 2:25 Pm    Original-Treating Physician or Chiropractor    Page 2-Insurer/TPA    Page 3-Employer    Page 4-Employee

## 2017-11-10 NOTE — PROGRESS NOTES
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   She was seen in  and imaging showed no fractures, CT head showed no acute changes       She continues to have some minor headaches and neck pain, but controlled with motrin.         She also has been having some persistent nausea, but no emeisis           Social History   Substance Use Topics   • Smoking status: Former Smoker     Packs/day: 0.50     Years: 30.00     Types: Cigarettes     Quit date: 7/11/1993   • Smokeless tobacco: Never Used   • Alcohol use 0.0 oz/week      Comment: once a year       Current Outpatient Prescriptions on File Prior to Visit   Medication Sig Dispense Refill   • losartan (COZAAR) 100 MG Tab TAKE 1 TABLET BY MOUTH ONCE DAILY *GENERIC FOR COZAAR 30 Tab 11   • levothyroxine (SYNTHROID) 100 MCG Tab TAKE 1 TABLET BY MOUTH EVERY MORNING ON AN EMPTY STOMACH 30 Tab 11   • hydrochlorothiazide (HYDRODIURIL) 25 MG Tab Take 1 Tab by mouth every day. 90 Tab 3   • ranitidine (ZANTAC) 150 MG Tab Take 150 mg by mouth 2 times a day.     • magnesium gluconate (MAG-G) 500 MG tablet Take 1,000 mg by mouth 3 times a day. Indications: 1000mg daily     • Cholecalciferol (VITAMIN D) 2000 UNIT TABS Take 2,000 Tabs by mouth every day. 30 Each 6   • Multiple Vitamin (MULTI-VITAMIN PO) Take  by mouth every day.       No current facility-administered medications on file prior to visit.        Past Medical History:   Diagnosis Date   • HTN (hypertension), benign 4/22/2011   • Hypothyroid 4/22/2011   • Arthritis    • Heart valve disease        Review of Systems   Eyes: Negative for blurred vision.   Respiratory: Negative for shortness of breath.    Cardiovascular: Negative for chest pain  "and palpitations.   Gastrointestinal: Positive for nausea    Skin: Negative for rash.   Neurological: Positive for headaches.   All other systems reviewed and are negative.         Objective:     Blood pressure 116/86, pulse 62, temperature 36.5 °C (97.7 °F), resp. rate 16, height 1.6 m (5' 3\"), weight 68.9 kg (152 lb), SpO2 93 %.    Physical Exam   Constitutional: pt is oriented to person, place, and time. Pt appears well-developed and well-nourished. No distress.   HENT:   Head: Normocephalic.  No bony step-off or scalp hematoma  No periorbital ecchymosis,    hemotympanum , cerebrospinal fluid  otorrhea, or CSF rhinorrhea No bony step-off.  Harry's sign negative  Mouth/Throat: No oropharyngeal exudate.   Eyes: Conjunctivae and EOM are normal. Pupils are equal, round, and reactive to light. No scleral icterus.   Cardiovascular: Normal rate, regular rhythm and normal heart sounds.    Pulmonary/Chest: Effort normal and breath sounds normal. No respiratory distress. Pt has no wheezes.   Neurologic: Alert and oriented. Cranial nerves II-XII intact, EOMs intact, no tongue deviation, PERRL, no facial asymmetry to motor or sensation, symmetric palate, normal finger-to-nose test, no pronator drift. No focal motor deficits. Symmetric reflexes. Normal station and gait, normal tandem walk.     c-spine - full AROM.   No tenderness.   Skin: Skin is warm. She is not diaphoretic. No erythema.   Psychiatric:  behavior is normal.   Speech - normal rate and clear.  Thought process is linear and goal-directed.   Nursing note and vitals reviewed.               Assessment/Plan:      1. Concussion without loss of consciousness, subsequent encounter  Improving.   Continue work restrictions  F/u 5-7 d    2. Nausea   -secondary to #1  - ondansetron (ZOFRAN ODT) 4 MG TABLET DISPERSIBLE; Take 1 Tab by mouth every 8 hours as needed.  Dispense: 10 Tab; Refill: 0      "

## 2017-11-16 ENCOUNTER — OCCUPATIONAL MEDICINE (OUTPATIENT)
Dept: URGENT CARE | Facility: CLINIC | Age: 72
End: 2017-11-16
Payer: COMMERCIAL

## 2017-11-16 VITALS
WEIGHT: 148 LBS | DIASTOLIC BLOOD PRESSURE: 70 MMHG | HEART RATE: 65 BPM | BODY MASS INDEX: 25.27 KG/M2 | OXYGEN SATURATION: 94 % | TEMPERATURE: 98.5 F | HEIGHT: 64 IN | SYSTOLIC BLOOD PRESSURE: 122 MMHG

## 2017-11-16 DIAGNOSIS — F07.81 POST CONCUSSIVE SYNDROME: ICD-10-CM

## 2017-11-16 PROCEDURE — 99213 OFFICE O/P EST LOW 20 MIN: CPT | Mod: 29 | Performed by: PHYSICIAN ASSISTANT

## 2017-11-16 ASSESSMENT — ENCOUNTER SYMPTOMS
NAUSEA: 0
HEADACHES: 1
ABDOMINAL PAIN: 0
CHILLS: 0
DIZZINESS: 0
FEVER: 0
VOMITING: 0
MUSCULOSKELETAL NEGATIVE: 1
SHORTNESS OF BREATH: 0
DIARRHEA: 0

## 2017-11-16 NOTE — LETTER
[facility]  [address] [place] [address]  Phone:  [phone] - Fax:  [phone]   [facility] Progress Report and Disability Certification  Date of Service: 11/16/2017   No Show:  No  Date / Time of Next Visit:     Claim Information   Patient Name: Alton Childs  Claim Number:     Employer:    Date of Injury: 11/6/2017     Insurer / TPA: Maryjane Paez  ID / SSN:     Occupation:   Diagnosis: The encounter diagnosis was Post concussive syndrome.    Medical Information   Related to Industrial Injury? 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   She was seen in  and imaging showed no fractures, CT head showed no acute changes. She presents to urgent care today for third visit. She reports intermittent headaches that she has been taking ibuprofen for with good relief. No nausea, vomiting, visual change, or dizziness.    Objective Findings: Physical Exam   Constitutional: She is oriented to person, place, and time. She appears well-developed and well-nourished. No distress.   HENT:   Head: Normocephalic and atraumatic.   Eyes: Pupils are equal, round, and reactive to light.   Cardiovascular: Normal rate.    Pulmonary/Chest: Effort normal.   Musculoskeletal: Normal range of motion.   Neurological: She is alert and oriented to person, place, and time. She displays normal reflexes. No cranial nerve deficit. She exhibits normal muscle tone. Coordination normal.   Skin: Skin is warm and dry. She is not diaphoretic.   Psychiatric: She has a normal mood and affect. Her behavior is normal.    Pre-Existing Condition(s):     Assessment:   Condition Improved    Status: Discharged /  MMI  Permanent Disability:No    Plan:         Diagnostics: X-rayCT  Comments:negative    Comments:       Disability Information   Status: Released to Full Duty    From:     Through:   Restrictions are:     Physical Restrictions   Sitting:    Standing:    Stooping:    Bending:      Squatting:    Walking:    Climbing:    Pushing:      Pulling:    Other:    Reaching Above Shoulder (L):   Reaching Above Shoulder (R):       Reaching Below Shoulder (L):    Reaching Below Shoulder (R):      Not to exceed Weight Limits   Carrying(hrs):   Weight Limit(lb):   Lifting(hrs):   Weight  Limit(lb):     Comments: Patient reports symptoms are improving and she is tolerating full duty well  Discharged/MMI today    Repetitive Actions   Hands: i.e. Fine Manipulations from Grasping:     Feet: i.e. Operating Foot Controls:     Driving / Operate Machinery:     Physician Name: Pooja Elizabeth P.A.-C. Physician Signature: POOJA Bertrand P.A.-C. e-Signature: Dr. Mikhail Fong, Medical Director   Clinic Name / Location: [facility]  [address] [place] [address] Clinic Phone Number: Dept: [phone]   Appointment Time: 2:00 Pm Visit Start Time: 1:57 PM   Check-In Time:  1:54 Pm Visit Discharge Time:  2:24pm   Original-Treating Physician or Chiropractor    Page 2-Insurer/TPA    Page 3-Employer    Page 4-Employee

## 2017-11-16 NOTE — PROGRESS NOTES
"Subjective:      Alton Childs is a 72 y.o. 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   She was seen in  and imaging showed no fractures, CT head showed no acute changes. She presents to urgent care today for third visit. She reports intermittent headaches that she has been taking ibuprofen for with good relief. No nausea, vomiting, visual change, or dizziness.      Head Injury    Associated symptoms include headaches. Pertinent negatives include no vomiting.       Review of Systems   Constitutional: Negative for chills and fever.   HENT: Negative for hearing loss.    Respiratory: Negative for shortness of breath.    Cardiovascular: Negative for chest pain.   Gastrointestinal: Negative for abdominal pain, diarrhea, nausea and vomiting.   Genitourinary: Negative.    Musculoskeletal: Negative.    Neurological: Positive for headaches. Negative for dizziness.          Objective:     /70   Pulse 65   Temp 36.9 °C (98.5 °F)   Ht 1.626 m (5' 4\")   Wt 67.1 kg (148 lb)   SpO2 94%   BMI 25.40 kg/m²      Physical Exam   Constitutional: She is oriented to person, place, and time. She appears well-developed and well-nourished. No distress.   HENT:   Head: Normocephalic and atraumatic.   Eyes: Pupils are equal, round, and reactive to light.   Neck: Normal range of motion.   Cardiovascular: Normal rate.    Pulmonary/Chest: Effort normal.   Musculoskeletal: Normal range of motion.   Neurological: She is alert and oriented to person, place, and time. She displays normal reflexes. No cranial nerve deficit. She exhibits normal muscle tone. She displays a negative Romberg sign. Coordination normal.   Skin: Skin is warm and dry. She is not diaphoretic.   Psychiatric: She " "has a normal mood and affect. Her behavior is normal.   Nursing note and vitals reviewed.         PMH:  has a past medical history of Arthritis; Heart valve disease; HTN (hypertension), benign (4/22/2011); and Hypothyroid (4/22/2011).  MEDS:   Current Outpatient Prescriptions:   •  ondansetron (ZOFRAN ODT) 4 MG TABLET DISPERSIBLE, Take 1 Tab by mouth every 8 hours as needed., Disp: 10 Tab, Rfl: 0  •  losartan (COZAAR) 100 MG Tab, TAKE 1 TABLET BY MOUTH ONCE DAILY *GENERIC FOR COZAAR, Disp: 30 Tab, Rfl: 11  •  levothyroxine (SYNTHROID) 100 MCG Tab, TAKE 1 TABLET BY MOUTH EVERY MORNING ON AN EMPTY STOMACH, Disp: 30 Tab, Rfl: 11  •  hydrochlorothiazide (HYDRODIURIL) 25 MG Tab, Take 1 Tab by mouth every day., Disp: 90 Tab, Rfl: 3  •  ranitidine (ZANTAC) 150 MG Tab, Take 150 mg by mouth 2 times a day., Disp: , Rfl:   •  magnesium gluconate (MAG-G) 500 MG tablet, Take 1,000 mg by mouth 3 times a day. Indications: 1000mg daily, Disp: , Rfl:   •  Cholecalciferol (VITAMIN D) 2000 UNIT TABS, Take 2,000 Tabs by mouth every day., Disp: 30 Each, Rfl: 6  •  Multiple Vitamin (MULTI-VITAMIN PO), Take  by mouth every day., Disp: , Rfl:   ALLERGIES:   Allergies   Allergen Reactions   • Penicillins      'NASTY\"     SURGHX:   Past Surgical History:   Procedure Laterality Date   • CERVICAL FUSION POSTERIOR  7/18/2012    Performed by LA NENA JENNINGS at SURGERY [facility]   • CERVICAL DECOMPRESSION POSTERIOR  7/18/2012    Performed by LA NENA JENNINGS at [facility]   • CERVICAL FORAMINOTOMY  7/18/2012    Performed by LA NENA JENNINGS at SUR[facility]   • INJ,EPIDURAL,CERV/THOR SINGLE  6/1/2012    Performed by ORLY ORTA at [facility]ERY [facility]   • CHOLECYSTECTOMY     • GYN SURGERY      hysterectomy   • OTHER      T&A   • OTHER ORTHOPEDIC SURGERY      kaur bunionectomy     SOCHX:  reports that she quit smoking about 24 years ago. Her smoking use included Cigarettes. She has a 15.00 pack-year smoking " history. She has never used smokeless tobacco. She reports that she drinks alcohol. She reports that she does not use drugs.  FH: family history includes Cancer in her mother; Genetic in her paternal grandmother; Heart Disease in her father; Hypertension in her father.       Assessment/Plan:     1. Post concussive syndrome    Patient's symptoms are improving and she has been tolerating full duty well  Discharged/MMI today

## 2018-06-04 ENCOUNTER — HOSPITAL ENCOUNTER (OUTPATIENT)
Dept: LAB | Facility: MEDICAL CENTER | Age: 73
End: 2018-06-04
Attending: INTERNAL MEDICINE
Payer: MEDICARE

## 2018-06-04 LAB
ALBUMIN SERPL BCP-MCNC: 4.3 G/DL (ref 3.2–4.9)
ALBUMIN/GLOB SERPL: 1.3 G/DL
ALP SERPL-CCNC: 100 U/L (ref 30–99)
ALT SERPL-CCNC: 14 U/L (ref 2–50)
ANION GAP SERPL CALC-SCNC: 9 MMOL/L (ref 0–11.9)
AST SERPL-CCNC: 16 U/L (ref 12–45)
BASOPHILS # BLD AUTO: 0.5 % (ref 0–1.8)
BASOPHILS # BLD: 0.04 K/UL (ref 0–0.12)
BILIRUB SERPL-MCNC: 0.5 MG/DL (ref 0.1–1.5)
BUN SERPL-MCNC: 18 MG/DL (ref 8–22)
CALCIUM SERPL-MCNC: 9.9 MG/DL (ref 8.5–10.5)
CEA SERPL-MCNC: 1.3 NG/ML (ref 0–3)
CHLORIDE SERPL-SCNC: 106 MMOL/L (ref 96–112)
CO2 SERPL-SCNC: 27 MMOL/L (ref 20–33)
CREAT SERPL-MCNC: 0.83 MG/DL (ref 0.5–1.4)
EOSINOPHIL # BLD AUTO: 0.04 K/UL (ref 0–0.51)
EOSINOPHIL NFR BLD: 0.5 % (ref 0–6.9)
ERYTHROCYTE [DISTWIDTH] IN BLOOD BY AUTOMATED COUNT: 44.2 FL (ref 35.9–50)
FERRITIN SERPL-MCNC: 101 NG/ML (ref 10–291)
GLOBULIN SER CALC-MCNC: 3.3 G/DL (ref 1.9–3.5)
GLUCOSE SERPL-MCNC: 87 MG/DL (ref 65–99)
HCT VFR BLD AUTO: 44 % (ref 37–47)
HGB BLD-MCNC: 14.8 G/DL (ref 12–16)
IMM GRANULOCYTES # BLD AUTO: 0.03 K/UL (ref 0–0.11)
IMM GRANULOCYTES NFR BLD AUTO: 0.4 % (ref 0–0.9)
LYMPHOCYTES # BLD AUTO: 2.09 K/UL (ref 1–4.8)
LYMPHOCYTES NFR BLD: 26.1 % (ref 22–41)
MCH RBC QN AUTO: 32 PG (ref 27–33)
MCHC RBC AUTO-ENTMCNC: 33.6 G/DL (ref 33.6–35)
MCV RBC AUTO: 95 FL (ref 81.4–97.8)
MONOCYTES # BLD AUTO: 0.47 K/UL (ref 0–0.85)
MONOCYTES NFR BLD AUTO: 5.9 % (ref 0–13.4)
NEUTROPHILS # BLD AUTO: 5.33 K/UL (ref 2–7.15)
NEUTROPHILS NFR BLD: 66.6 % (ref 44–72)
NRBC # BLD AUTO: 0 K/UL
NRBC BLD-RTO: 0 /100 WBC
PLATELET # BLD AUTO: 296 K/UL (ref 164–446)
PMV BLD AUTO: 11.1 FL (ref 9–12.9)
POTASSIUM SERPL-SCNC: 3.7 MMOL/L (ref 3.6–5.5)
PROT SERPL-MCNC: 7.6 G/DL (ref 6–8.2)
RBC # BLD AUTO: 4.63 M/UL (ref 4.2–5.4)
SODIUM SERPL-SCNC: 142 MMOL/L (ref 135–145)
T4 FREE SERPL-MCNC: 1.04 NG/DL (ref 0.53–1.43)
WBC # BLD AUTO: 8 K/UL (ref 4.8–10.8)

## 2018-06-04 PROCEDURE — 82728 ASSAY OF FERRITIN: CPT

## 2018-06-04 PROCEDURE — 84439 ASSAY OF FREE THYROXINE: CPT

## 2018-06-04 PROCEDURE — 36415 COLL VENOUS BLD VENIPUNCTURE: CPT

## 2018-06-04 PROCEDURE — 84305 ASSAY OF SOMATOMEDIN: CPT

## 2018-06-04 PROCEDURE — 85025 COMPLETE CBC W/AUTO DIFF WBC: CPT

## 2018-06-04 PROCEDURE — 84482 T3 REVERSE: CPT

## 2018-06-04 PROCEDURE — 82378 CARCINOEMBRYONIC ANTIGEN: CPT

## 2018-06-04 PROCEDURE — 86300 IMMUNOASSAY TUMOR CA 15-3: CPT

## 2018-06-04 PROCEDURE — 80053 COMPREHEN METABOLIC PANEL: CPT

## 2018-06-06 LAB
IGF-I SERPL-MCNC: 94 NG/ML (ref 23–221)
IGF-I Z-SCORE SERPL: 0

## 2018-06-07 LAB
CANCER AG27-29 SERPL-ACNC: 10.8 U/ML (ref 0–40)
T3REVERSE SERPL-MCNC: 19.4 NG/DL (ref 9–27)

## 2018-07-09 DIAGNOSIS — I10 HTN (HYPERTENSION), BENIGN: Chronic | ICD-10-CM

## 2018-07-09 RX ORDER — LOSARTAN POTASSIUM 100 MG/1
TABLET ORAL
Qty: 90 EACH | Refills: 0 | Status: SHIPPED | OUTPATIENT
Start: 2018-07-09 | End: 2018-09-11 | Stop reason: SDUPTHER

## 2018-07-09 RX ORDER — LEVOTHYROXINE SODIUM 0.1 MG/1
100 TABLET ORAL EVERY MORNING
Qty: 90 TAB | Refills: 0 | Status: SHIPPED | OUTPATIENT
Start: 2018-07-09 | End: 2018-09-11 | Stop reason: SDUPTHER

## 2018-07-09 RX ORDER — HYDROCHLOROTHIAZIDE 25 MG/1
25 TABLET ORAL DAILY
Qty: 90 TAB | Refills: 0 | Status: SHIPPED | OUTPATIENT
Start: 2018-07-09 | End: 2018-09-11 | Stop reason: SDUPTHER

## 2018-07-09 NOTE — TELEPHONE ENCOUNTER
Was the patient seen in the last year in this department? No     Does patient have an active prescription for medications requested? No     Received Request Via: Patient     Appt made for 07/31

## 2018-08-18 ENCOUNTER — OFFICE VISIT (OUTPATIENT)
Dept: URGENT CARE | Facility: CLINIC | Age: 73
End: 2018-08-18
Payer: MEDICARE

## 2018-08-18 VITALS
SYSTOLIC BLOOD PRESSURE: 122 MMHG | DIASTOLIC BLOOD PRESSURE: 70 MMHG | RESPIRATION RATE: 16 BRPM | TEMPERATURE: 97.6 F | OXYGEN SATURATION: 93 % | HEIGHT: 64 IN | BODY MASS INDEX: 26.29 KG/M2 | WEIGHT: 154 LBS | HEART RATE: 64 BPM

## 2018-08-18 DIAGNOSIS — J00 NASOPHARYNGITIS: ICD-10-CM

## 2018-08-18 DIAGNOSIS — R05.9 COUGH: ICD-10-CM

## 2018-08-18 DIAGNOSIS — H10.89 OTHER CONJUNCTIVITIS OF LEFT EYE: ICD-10-CM

## 2018-08-18 PROCEDURE — 99214 OFFICE O/P EST MOD 30 MIN: CPT | Performed by: PHYSICIAN ASSISTANT

## 2018-08-18 RX ORDER — OFLOXACIN 3 MG/ML
SOLUTION/ DROPS OPHTHALMIC
Qty: 5 ML | Refills: 0 | Status: SHIPPED | OUTPATIENT
Start: 2018-08-18 | End: 2019-01-24

## 2018-08-18 RX ORDER — METHYLPREDNISOLONE 4 MG/1
TABLET ORAL
Qty: 1 TAB | Refills: 0 | Status: SHIPPED | OUTPATIENT
Start: 2018-08-18 | End: 2019-01-25

## 2018-08-18 RX ORDER — PROMETHAZINE HYDROCHLORIDE AND CODEINE PHOSPHATE 6.25; 1 MG/5ML; MG/5ML
5-10 SYRUP ORAL 3 TIMES DAILY PRN
Qty: 120 ML | Refills: 0 | Status: SHIPPED | OUTPATIENT
Start: 2018-08-18 | End: 2018-12-10 | Stop reason: SDUPTHER

## 2018-08-18 RX ORDER — AZITHROMYCIN 250 MG/1
TABLET, FILM COATED ORAL
Qty: 6 TAB | Refills: 1 | Status: SHIPPED | OUTPATIENT
Start: 2018-08-18 | End: 2019-01-24

## 2018-08-18 ASSESSMENT — ENCOUNTER SYMPTOMS
CONSTITUTIONAL NEGATIVE: 1
SPUTUM PRODUCTION: 0
SHORTNESS OF BREATH: 0
EYE REDNESS: 1
DOUBLE VISION: 0
HEADACHES: 1
SINUS PRESSURE: 1
EYE DISCHARGE: 1
COUGH: 1
CARDIOVASCULAR NEGATIVE: 1
SORE THROAT: 1
BLURRED VISION: 0
WHEEZING: 0
PHOTOPHOBIA: 0
SINUS PAIN: 1
EYE PAIN: 0

## 2018-08-18 NOTE — PROGRESS NOTES
"Subjective:      Alton Childs is a 73 y.o. female who presents with Sinus Problem (sinus pain and congestion, headache, eye redness, sore throat x 5 days)            Sinus Problem   This is a new (sinus press, st, cough; L eye redn/dc) problem. The current episode started in the past 7 days. The problem is unchanged. There has been no fever. The pain is moderate. Associated symptoms include congestion, coughing, headaches, sinus pressure and a sore throat. Pertinent negatives include no shortness of breath. Past treatments include nothing. The treatment provided no relief.       Review of Systems   Constitutional: Negative.    HENT: Positive for congestion, sinus pain, sinus pressure and sore throat.    Eyes: Positive for discharge and redness. Negative for blurred vision, double vision, photophobia and pain.   Respiratory: Positive for cough. Negative for sputum production, shortness of breath and wheezing.    Cardiovascular: Negative.    Skin: Negative.    Neurological: Positive for headaches.   All other systems reviewed and are negative.         Objective:     /70   Pulse 64   Temp 36.4 °C (97.6 °F)   Resp 16   Ht 1.626 m (5' 4.02\")   Wt 69.9 kg (154 lb)   SpO2 93%   BMI 26.42 kg/m²      Physical Exam   Constitutional: She is oriented to person, place, and time. She appears well-developed and well-nourished. No distress.   HENT:   Head: Normocephalic and atraumatic.   Mouth/Throat: No oropharyngeal exudate.   +maxill.sinus press.  +phar./tons redn       Eyes: Pupils are equal, round, and reactive to light. Conjunctivae and EOM are normal. Left eye exhibits no discharge (+redn).   Neck: Normal range of motion. Neck supple.   Cardiovascular: Normal rate.    Pulmonary/Chest: Effort normal and breath sounds normal. No respiratory distress.   Lymphadenopathy:     She has cervical adenopathy.   Neurological: She is alert and oriented to person, place, and time.   Skin: Skin is warm and dry. No " rash noted.   Psychiatric: She has a normal mood and affect. Her behavior is normal.   Nursing note and vitals reviewed.    Active Ambulatory Problems     Diagnosis Date Noted   • HTN (hypertension), benign 04/22/2011   • DJD (degenerative joint disease) of cervical spine 07/18/2012   • Dyslipidemia 10/25/2012   • Acquired hypothyroidism 06/02/2015   • Tinnitus 06/09/2015   • Family history of breast cancer 04/21/2016     Resolved Ambulatory Problems     Diagnosis Date Noted   • Hypothyroid 04/22/2011   • Neck pain 08/27/2012   • Hypoxia 10/25/2012   • Nocturnal hypoxia 01/21/2015     Past Medical History:   Diagnosis Date   • Arthritis    • Heart valve disease    • HTN (hypertension), benign 4/22/2011   • Hypothyroid 4/22/2011     Current Outpatient Prescriptions on File Prior to Visit   Medication Sig Dispense Refill   • losartan (COZAAR) 100 MG Tab TAKE 1 TABLET BY MOUTH ONCE DAILY *GENERIC FOR COZAAR 90 Each 0   • levothyroxine (SYNTHROID) 100 MCG Tab Take 1 Tab by mouth every morning. ON A EMPTY STOMACH 90 Tab 0   • hydroCHLOROthiazide (HYDRODIURIL) 25 MG Tab Take 1 Tab by mouth every day. 90 Tab 0   • ondansetron (ZOFRAN ODT) 4 MG TABLET DISPERSIBLE Take 1 Tab by mouth every 8 hours as needed. 10 Tab 0   • ranitidine (ZANTAC) 150 MG Tab Take 150 mg by mouth 2 times a day.     • magnesium gluconate (MAG-G) 500 MG tablet Take 1,000 mg by mouth 3 times a day. Indications: 1000mg daily     • Cholecalciferol (VITAMIN D) 2000 UNIT TABS Take 2,000 Tabs by mouth every day. 30 Each 6   • Multiple Vitamin (MULTI-VITAMIN PO) Take  by mouth every day.       No current facility-administered medications on file prior to visit.      Social History     Social History   • Marital status:      Spouse name: N/A   • Number of children: N/A   • Years of education: N/A     Occupational History   • Not on file.     Social History Main Topics   • Smoking status: Former Smoker     Packs/day: 0.50     Years: 30.00     Types:  Cigarettes     Quit date: 7/11/1993   • Smokeless tobacco: Never Used   • Alcohol use 0.0 oz/week      Comment: once a year   • Drug use: No   • Sexual activity: Not Currently     Other Topics Concern   • Not on file     Social History Narrative   • No narrative on file     Family History   Problem Relation Age of Onset   • Hypertension Father    • Heart Disease Father    • Cancer Mother         breast   • Genetic Paternal Grandmother      Penicillins              Assessment/Plan:     ·  NASOPHARYNGITIS      · Start w/MucinexD; nsaids; [hold rx for abx prn [conditional use] if sx persist/worsen]  ·

## 2018-09-10 ENCOUNTER — HOSPITAL ENCOUNTER (OUTPATIENT)
Dept: RADIOLOGY | Facility: MEDICAL CENTER | Age: 73
End: 2018-09-10
Attending: INTERNAL MEDICINE
Payer: MEDICARE

## 2018-09-10 DIAGNOSIS — Z12.39 SCREENING BREAST EXAMINATION: ICD-10-CM

## 2018-09-10 PROCEDURE — 77067 SCR MAMMO BI INCL CAD: CPT

## 2018-09-11 ENCOUNTER — OFFICE VISIT (OUTPATIENT)
Dept: MEDICAL GROUP | Facility: MEDICAL CENTER | Age: 73
End: 2018-09-11
Payer: MEDICARE

## 2018-09-11 VITALS
RESPIRATION RATE: 16 BRPM | HEART RATE: 61 BPM | HEIGHT: 64 IN | TEMPERATURE: 98.7 F | OXYGEN SATURATION: 95 % | WEIGHT: 156 LBS | SYSTOLIC BLOOD PRESSURE: 128 MMHG | BODY MASS INDEX: 26.63 KG/M2 | DIASTOLIC BLOOD PRESSURE: 76 MMHG

## 2018-09-11 DIAGNOSIS — I10 HTN (HYPERTENSION), BENIGN: Chronic | ICD-10-CM

## 2018-09-11 DIAGNOSIS — E03.9 ACQUIRED HYPOTHYROIDISM: ICD-10-CM

## 2018-09-11 DIAGNOSIS — J00 ACUTE NASOPHARYNGITIS: ICD-10-CM

## 2018-09-11 PROCEDURE — 99213 OFFICE O/P EST LOW 20 MIN: CPT | Performed by: INTERNAL MEDICINE

## 2018-09-11 RX ORDER — HYDROCHLOROTHIAZIDE 25 MG/1
TABLET ORAL
Qty: 90 TAB | Refills: 3 | Status: SHIPPED | OUTPATIENT
Start: 2018-09-11 | End: 2019-12-19 | Stop reason: SDUPTHER

## 2018-09-11 RX ORDER — PROMETHAZINE HYDROCHLORIDE AND CODEINE PHOSPHATE 6.25; 1 MG/5ML; MG/5ML
5 SYRUP ORAL 4 TIMES DAILY PRN
Qty: 120 ML | Refills: 0 | Status: SHIPPED | OUTPATIENT
Start: 2018-09-11 | End: 2018-09-16

## 2018-09-11 RX ORDER — LOSARTAN POTASSIUM 100 MG/1
TABLET ORAL
Qty: 90 TAB | Refills: 3 | Status: SHIPPED | OUTPATIENT
Start: 2018-09-11 | End: 2019-05-28 | Stop reason: SDUPTHER

## 2018-09-11 RX ORDER — LEVOTHYROXINE SODIUM 0.1 MG/1
TABLET ORAL
Qty: 90 TAB | Refills: 3 | Status: SHIPPED | OUTPATIENT
Start: 2018-09-11 | End: 2019-12-19 | Stop reason: SDUPTHER

## 2018-09-11 ASSESSMENT — PATIENT HEALTH QUESTIONNAIRE - PHQ9: CLINICAL INTERPRETATION OF PHQ2 SCORE: 0

## 2018-09-12 NOTE — ASSESSMENT & PLAN NOTE
She reports that she developed symptoms of an upper respiratory infection about a month ago with a cough productive of a moderate amount of phlegm had a headache and congestion and eye irritation.  She went to urgent care and received some codeine cough medication as well as eyedrops and a Medrol dose pack and a Z-Jaxon.  Much of her symptoms got better but she still complains of a cough at night primarily and she still has some congestion.  She denies shaking chills or fevers or myalgias.

## 2018-09-12 NOTE — ASSESSMENT & PLAN NOTE
This patient has a history of hypothyroidism and wants a refill on her thyroid medication.  In June her free T4 was in the normal range at 1.04.  Clinically she is euthyroid.

## 2018-09-12 NOTE — PROGRESS NOTES
Subjective:     Chief Complaint   Patient presents with   • Cough     for 1 month     Alton Childs is a 73 y.o. female here today for follow-up of upper respiratory infection along with hypertension and she wants a refill on thyroid medication.    Acquired hypothyroidism  This patient has a history of hypothyroidism and wants a refill on her thyroid medication.  In June her free T4 was in the normal range at 1.04.  Clinically she is euthyroid.    Acute nasopharyngitis  She reports that she developed symptoms of an upper respiratory infection about a month ago with a cough productive of a moderate amount of phlegm had a headache and congestion and eye irritation.  She went to urgent care and received some codeine cough medication as well as eyedrops and a Medrol dose pack and a Z-Jaxon.  Much of her symptoms got better but she still complains of a cough at night primarily and she still has some congestion.  She denies shaking chills or fevers or myalgias.    HTN (hypertension), benign  She has a history of hypertension for which she is on hydrochlorothiazide and losartan.  Blood pressure is satisfactory.  She denies lightheadedness or headaches.       Diagnoses of Acute nasopharyngitis, HTN (hypertension), benign, and Acquired hypothyroidism were pertinent to this visit.    Allergies: Penicillins  Current medicines (including changes today)  Current Outpatient Prescriptions   Medication Sig Dispense Refill   • promethazine-codeine (PHENERGAN-CODEINE) 6.25-10 MG/5ML Syrup Take 5 mL by mouth 4 times a day as needed for up to 5 days. 120 mL 0   • ondansetron (ZOFRAN ODT) 4 MG TABLET DISPERSIBLE Take 1 Tab by mouth every 8 hours as needed. 10 Tab 0   • ranitidine (ZANTAC) 150 MG Tab Take 150 mg by mouth 2 times a day.     • magnesium gluconate (MAG-G) 500 MG tablet Take 1,000 mg by mouth 3 times a day. Indications: 1000mg daily     • Multiple Vitamin (MULTI-VITAMIN PO) Take  by mouth every day.     •  "hydroCHLOROthiazide (HYDRODIURIL) 25 MG Tab TAKE 1 TABLET BY MOUTH EVERY DAY 90 Tab 3   • losartan (COZAAR) 100 MG Tab TAKE ONE TABLET BY MOUTH ONCE DAILY*GENERIC FOR COZAAR* 90 Tab 3   • levothyroxine (SYNTHROID) 100 MCG Tab TAKE ONE TABLET BY MOUTH EVERY MORNING ON AN EMPTY STOMACH 90 Tab 3   • azithromycin (ZITHROMAX) 250 MG Tab z-raimundo; U.D. 6 Tab 1   • MethylPREDNISolone (MEDROL DOSEPAK) 4 MG Tablet Therapy Pack U.D. 1 Tab 0   • ofloxacin (OCUFLOX) 0.3 % Solution Place 1 gtt into affected eye[s] q3 hrs 5 mL 0   • Cholecalciferol (VITAMIN D) 2000 UNIT TABS Take 2,000 Tabs by mouth every day. 30 Each 6     No current facility-administered medications for this visit.        She  has a past medical history of Arthritis; Heart valve disease; HTN (hypertension), benign (4/22/2011); and Hypothyroid (4/22/2011).    ROS    Patient denies significant change in strength, weight or appetite.  No significant lightheadedness or headaches.  No change in vision, hearing, or swallowing.  No new dyspnea, coughing, chest pain, or palpitations except for the cough and congestion noted above that seems worse especially at night..  No indigestion, abdominal pain, or change in bowel habits.  No change in urinating.  No new ankle swelling.       Objective:     PE:  /76   Pulse 61   Temp 37.1 °C (98.7 °F)   Resp 16   Ht 1.626 m (5' 4.02\")   Wt 70.8 kg (156 lb)   SpO2 95%   BMI 26.76 kg/m²    Neck is supple without significant lymphadenopathy or masses.  No percussion tenderness over sinuses.  Lungs are clear with normal breath sounds without wheezes or rales .  Cardiovascular: peripheral circulation is satisfactory, heart sounds are unchanged and unremarkable.  Abdomen is soft, without masses or tenderness, with normal bowel sounds.  Extremities are without significant edema, cyanosis or deformity.      Assessment and Plan:   The following treatment plan was discussed  1. Acute nasopharyngitis  promethazine-codeine " (PHENERGAN-CODEINE) 6.25-10 MG/5ML Syrup    Fluids, steroid nasal spray, Mucinex, refill of codeine cough medicine.  Report any persistent symptoms or worsening.   2. HTN (hypertension), benign      No medication change.  We briefly reviewed low-salt diet.   3. Acquired hypothyroidism      I refilled her prescription for levothyroxine.       Followup: She has an appointment with Dr. Robles later this month and will keep that.

## 2018-09-12 NOTE — ASSESSMENT & PLAN NOTE
She has a history of hypertension for which she is on hydrochlorothiazide and losartan.  Blood pressure is satisfactory.  She denies lightheadedness or headaches.

## 2018-09-28 ENCOUNTER — TELEPHONE (OUTPATIENT)
Dept: MEDICAL GROUP | Facility: MEDICAL CENTER | Age: 73
End: 2018-09-28

## 2018-10-17 ENCOUNTER — PATIENT OUTREACH (OUTPATIENT)
Dept: HEALTH INFORMATION MANAGEMENT | Facility: OTHER | Age: 73
End: 2018-10-17

## 2018-10-17 NOTE — PROGRESS NOTES
Outcome: Left Message    Please transfer to Patient Outreach Team at [phone] when patient returns call.    WebIZ Checked & Epic Updated:  yes    [facility] Verified: yes    Attempt # 1

## 2018-10-26 NOTE — PROGRESS NOTES
Pt has a very busy schedule and will call us back or schedule through [email or web address] once she gets more acquainted with her new work schedule. Pt declined flu immunization .

## 2018-11-05 NOTE — PROGRESS NOTES
Outcome: Left Message    Please transfer to Patient Outreach Team at [phone] when patient returns call.    WebIZ Checked & Epic Updated:  yes    [facility] Verified: yes    Attempt # 3

## 2018-11-12 NOTE — PROGRESS NOTES
Outcome: Pt will not be able to schedule until December 1st and requested a call back if she does not call us first to schedule.     Please transfer to Patient Outreach Team at [phone] when patient returns call.    WebIZ Checked & Epic Updated:  yes    [facility] Verified: yes    Attempt # 4

## 2018-12-10 ENCOUNTER — OFFICE VISIT (OUTPATIENT)
Dept: MEDICAL GROUP | Facility: MEDICAL CENTER | Age: 73
End: 2018-12-10
Payer: MEDICARE

## 2018-12-10 VITALS
TEMPERATURE: 98.1 F | OXYGEN SATURATION: 94 % | DIASTOLIC BLOOD PRESSURE: 70 MMHG | HEART RATE: 63 BPM | SYSTOLIC BLOOD PRESSURE: 122 MMHG | WEIGHT: 156.75 LBS | BODY MASS INDEX: 26.76 KG/M2 | HEIGHT: 64 IN

## 2018-12-10 DIAGNOSIS — J06.9 UPPER RESPIRATORY TRACT INFECTION, UNSPECIFIED TYPE: ICD-10-CM

## 2018-12-10 DIAGNOSIS — R05.9 COUGH: ICD-10-CM

## 2018-12-10 DIAGNOSIS — J00 NASOPHARYNGITIS: ICD-10-CM

## 2018-12-10 LAB
FLUAV+FLUBV AG SPEC QL IA: NEGATIVE
INT CON NEG: NEGATIVE
INT CON POS: POSITIVE

## 2018-12-10 PROCEDURE — 99214 OFFICE O/P EST MOD 30 MIN: CPT | Performed by: NURSE PRACTITIONER

## 2018-12-10 PROCEDURE — 87804 INFLUENZA ASSAY W/OPTIC: CPT | Performed by: NURSE PRACTITIONER

## 2018-12-10 RX ORDER — FLUTICASONE PROPIONATE 50 MCG
2 SPRAY, SUSPENSION (ML) NASAL DAILY
Qty: 1 BOTTLE | Refills: 11 | Status: SHIPPED | OUTPATIENT
Start: 2018-12-10 | End: 2020-03-03

## 2018-12-10 RX ORDER — PROMETHAZINE HYDROCHLORIDE AND CODEINE PHOSPHATE 6.25; 1 MG/5ML; MG/5ML
5-10 SYRUP ORAL 3 TIMES DAILY PRN
Qty: 120 ML | Refills: 0 | Status: SHIPPED
Start: 2018-12-10 | End: 2018-12-15

## 2018-12-10 NOTE — PROGRESS NOTES
"CC: Cough (x4); Fever (x4); Headache (x4); Sore Throat (x4); and Nausea (x4)        HPI:     Alton is a [facility] patient, all problems are new to me today, presents today for the followin. Upper respiratory tract infection, unspecified type/ Nasopharyngitis/Cough  C/o 4 days of cough, low grade fever, associated headache \"feeling fuzzy in the head\" sore throat.  States initially cough is productive now clear and dry.  Cough is worse first thing when she gets up in the morning.  Using a saline nasal spray.  States that her fever was 101, was 99 this morning.    Denies asthma or bronchitis    Current Outpatient Prescriptions   Medication Sig Dispense Refill   • promethazine-codeine (PHENERGAN-CODEINE) 6.25-10 MG/5ML Syrup Take 5-10 mL by mouth 3 times a day as needed for Cough (or use qhs) for up to 5 days. 120 mL 0   • fluticasone (FLONASE) 50 MCG/ACT nasal spray Spray 2 Sprays in nose every day. 1 Bottle 11   • hydroCHLOROthiazide (HYDRODIURIL) 25 MG Tab TAKE 1 TABLET BY MOUTH EVERY DAY 90 Tab 3   • losartan (COZAAR) 100 MG Tab TAKE ONE TABLET BY MOUTH ONCE DAILY*GENERIC FOR COZAAR* 90 Tab 3   • levothyroxine (SYNTHROID) 100 MCG Tab TAKE ONE TABLET BY MOUTH EVERY MORNING ON AN EMPTY STOMACH 90 Tab 3   • azithromycin (ZITHROMAX) 250 MG Tab z-raimundo; U.D. 6 Tab 1   • MethylPREDNISolone (MEDROL DOSEPAK) 4 MG Tablet Therapy Pack U.D. 1 Tab 0   • ofloxacin (OCUFLOX) 0.3 % Solution Place 1 gtt into affected eye[s] q3 hrs 5 mL 0   • ondansetron (ZOFRAN ODT) 4 MG TABLET DISPERSIBLE Take 1 Tab by mouth every 8 hours as needed. 10 Tab 0   • ranitidine (ZANTAC) 150 MG Tab Take 150 mg by mouth 2 times a day.     • magnesium gluconate (MAG-G) 500 MG tablet Take 1,000 mg by mouth 3 times a day. Indications: 1000mg daily     • Cholecalciferol (VITAMIN D) 2000 UNIT TABS Take 2,000 Tabs by mouth every day. 30 Each 6   • Multiple Vitamin (MULTI-VITAMIN PO) Take  by mouth every day.       No current facility-administered medications " "for this visit.      Social History   Substance Use Topics   • Smoking status: Former Smoker     Packs/day: 0.75     Years: 32.00     Types: Cigarettes     Quit date: 7/11/1993   • Smokeless tobacco: Never Used      Comment: started at 16   • Alcohol use 0.0 oz/week      Comment: once a year     I reviewed patients allergies, problem list and medications today in [facility].    ROS: Any/all pertinent positives listed in the HPI, otherwise all others reviewed are negative today.      /70   Pulse 63   Temp 36.7 °C (98.1 °F)   Ht 1.626 m (5' 4\")   Wt 71.1 kg (156 lb 12 oz)   SpO2 94%   BMI 26.91 kg/m²     Exam:   Gen: Alert and oriented, No apparent distress. WDWN  Psych: A+Ox3, normal affect and mood  Skin: Warm, dry and intact. Good turgor   No rashes in visible areas.  Eye: Conjunctiva clear, lids normal  ENMT: Lips without lesions, good dentition   Oropharynx clear.  Bilateral TMs unremarkable, no pain with pressure of the bilateral frontal or maxillary sinuses.  Mild erythema bilateral nasal turbinates  Neck: No Lymphadenopathy, Thyromegaly, Bruits.   Trachea midline, no masses  Lungs: Clear to auscultation bilaterally, no rales or rhonchi   Unlabored respiratory effort. Rare dry cough in room  CV: Regular rate and rhythm, S1, S2. No murmurs.   No Edema  Point-of-care influenza: neg      Assessment and Plan.   73 y.o. female with the following issues.    1. Upper respiratory tract infection, unspecified type/Nasopharyngitis/ Cough  Discussed viral versus bacterial, importance of fluids, rest and hand hygiene.  May use over-the-counter anti-pyuretics and/or antitussives as needed.  Promethazine codeine cough syrup at night we discussed the use of Flonase.  Continue saline nasal spray.  Note given for work  Return to the office if necessary temperature, symptoms aren't resolving or new symptoms.   reviewed from state pharmacy database-Medications found to be medically necessary/appropriate.    - POCT " Influenza A/B  - promethazine-codeine (PHENERGAN-CODEINE) 6.25-10 MG/5ML Syrup; Take 5-10 mL by mouth 3 times a day as needed for Cough (or use qhs) for up to 5 days.  Dispense: 120 mL; Refill: 0  - fluticasone (FLONASE) 50 MCG/ACT nasal spray; Spray 2 Sprays in nose every day.  Dispense: 1 Bottle; Refill: 11

## 2018-12-10 NOTE — LETTER
December 10, 2018         Patient: Alton Childs   YOB: 1945   Date of Visit: 12/10/2018           To Whom it May Concern:    Alton Childs was seen in my clinic on 12/10/2018. She may return to work on 12/13/18.    If you have any questions or concerns, please don't hesitate to call.        Sincerely,           DANITA Mccoy  Electronically Signed

## 2019-01-08 DIAGNOSIS — R05.9 COUGH: ICD-10-CM

## 2019-01-08 RX ORDER — PROMETHAZINE HYDROCHLORIDE AND CODEINE PHOSPHATE 6.25; 1 MG/5ML; MG/5ML
SOLUTION ORAL
Qty: 120 ML | Refills: 0 | Status: SHIPPED
Start: 2019-01-08 | End: 2019-01-15

## 2019-01-24 PROBLEM — J00 ACUTE NASOPHARYNGITIS: Status: RESOLVED | Noted: 2018-09-11 | Resolved: 2019-01-24

## 2019-01-25 ENCOUNTER — OFFICE VISIT (OUTPATIENT)
Dept: MEDICAL GROUP | Facility: MEDICAL CENTER | Age: 74
End: 2019-01-25
Payer: MEDICARE

## 2019-01-25 VITALS
WEIGHT: 165 LBS | HEART RATE: 76 BPM | BODY MASS INDEX: 28.17 KG/M2 | TEMPERATURE: 97.5 F | OXYGEN SATURATION: 96 % | DIASTOLIC BLOOD PRESSURE: 80 MMHG | HEIGHT: 64 IN | SYSTOLIC BLOOD PRESSURE: 122 MMHG

## 2019-01-25 DIAGNOSIS — I10 HTN (HYPERTENSION), BENIGN: Chronic | ICD-10-CM

## 2019-01-25 DIAGNOSIS — Z78.0 POST-MENOPAUSAL: ICD-10-CM

## 2019-01-25 DIAGNOSIS — Z12.11 SCREEN FOR COLON CANCER: ICD-10-CM

## 2019-01-25 DIAGNOSIS — R05.9 COUGH: ICD-10-CM

## 2019-01-25 DIAGNOSIS — E03.9 ACQUIRED HYPOTHYROIDISM: ICD-10-CM

## 2019-01-25 PROCEDURE — 8041 PR SCP AHA: Performed by: INTERNAL MEDICINE

## 2019-01-25 PROCEDURE — 99214 OFFICE O/P EST MOD 30 MIN: CPT | Performed by: INTERNAL MEDICINE

## 2019-01-25 NOTE — PROGRESS NOTES
Annual Health Assessment Questions:    1.  Are you currently engaging in any exercise or physical activity? No    2.  How would you describe your mood or emotional well-being today? fair    3.  Have you had any falls in the last year? No    4.  Have you noticed any problems with your balance or had difficulty walking? Yes    5.  In the last six months have you experienced any leakage of urine? No    6. DPA/Advanced Directive: Patient has Durable Power of , but it is not on file. Instructed to bring in a copy to scan into their chart.    CC: Cough, thyroid.    HPI:   Alton presents today with the following.    1. Cough  Presents complaining of cough for the past week.  No fevers or chills no earaches cough is mostly nonproductive.  She is concerned because she has had 3 respiratory illnesses all starting from when she was on a plane and somebody was coughing up blood next to her.  She denies any fevers or night sweats.    2. Acquired hypothyroidism  Maintain on thyroid medication well overdue for recheck.    3. HTN  Blood pressure under good control in office today she is compliant with medications.      Patient Active Problem List    Diagnosis Date Noted   • Family history of breast cancer 04/21/2016   • Tinnitus 06/09/2015   • Acquired hypothyroidism 06/02/2015   • Dyslipidemia 10/25/2012   • DJD (degenerative joint disease) of cervical spine 07/18/2012   • HTN (hypertension), benign 04/22/2011       Current Outpatient Prescriptions   Medication Sig Dispense Refill   • fluticasone (FLONASE) 50 MCG/ACT nasal spray Spray 2 Sprays in nose every day. 1 Bottle 11   • hydroCHLOROthiazide (HYDRODIURIL) 25 MG Tab TAKE 1 TABLET BY MOUTH EVERY DAY 90 Tab 3   • losartan (COZAAR) 100 MG Tab TAKE ONE TABLET BY MOUTH ONCE DAILY*GENERIC FOR COZAAR* 90 Tab 3   • levothyroxine (SYNTHROID) 100 MCG Tab TAKE ONE TABLET BY MOUTH EVERY MORNING ON AN EMPTY STOMACH 90 Tab 3     No current facility-administered medications for  "this visit.          Allergies as of 01/25/2019 - Reviewed 01/25/2019   Allergen Reaction Noted   • Penicillins  07/11/2012        ROS: Denies Chest pain, SOB, LE edema.    /80 (BP Location: Right arm, Patient Position: Sitting)   Pulse 76   Temp 36.4 °C (97.5 °F)   Ht 1.626 m (5' 4\")   Wt 74.8 kg (165 lb)   SpO2 96%   BMI 28.32 kg/m²     Physical Exam:  Gen:         Alert and oriented, No apparent distress.  Neck:        No Lymphadenopathy or Bruits.  Lungs:     Clear to auscultation bilaterally  CV:          Regular rate and rhythm. No murmurs, rubs or gallops.               Ext:          No clubbing, cyanosis, edema.      Assessment and Plan.   73 y.o. female with the following issues.    1. Cough  She does have a significant smoking history sending for pulmonary function test.  Current symptomology consistent with virus.  Given her contact with someone hemoptysis sending for TB test.  - Quantiferon Gold Plus TB (4 tube); Future  - PULMONARY FUNCTION TESTS -Test requested: Complete Pulmonary Function Test; Future    2. Acquired hypothyroidism  Recheck thyroid  - COMP METABOLIC PANEL; Future  - TSH; Future    3. HTN  Currently well controlled, Discuss diet, exercise and salt restriction.  No change to medication therapy.        Screen osteporosis  - DS-BONE DENSITY STUDY (DEXA); Future    . Screen for colon cancer    - OCCULT BLOOD FECES IMMUNOASSAY; Future        "

## 2019-01-30 ENCOUNTER — HOSPITAL ENCOUNTER (OUTPATIENT)
Dept: LAB | Facility: MEDICAL CENTER | Age: 74
End: 2019-01-30
Attending: INTERNAL MEDICINE
Payer: MEDICARE

## 2019-01-30 DIAGNOSIS — E03.9 ACQUIRED HYPOTHYROIDISM: ICD-10-CM

## 2019-01-30 DIAGNOSIS — R05.9 COUGH: ICD-10-CM

## 2019-01-30 LAB
ALBUMIN SERPL BCP-MCNC: 4.2 G/DL (ref 3.2–4.9)
ALBUMIN/GLOB SERPL: 1.1 G/DL
ALP SERPL-CCNC: 117 U/L (ref 30–99)
ALT SERPL-CCNC: 29 U/L (ref 2–50)
ANION GAP SERPL CALC-SCNC: 9 MMOL/L (ref 0–11.9)
AST SERPL-CCNC: 29 U/L (ref 12–45)
BILIRUB SERPL-MCNC: 0.7 MG/DL (ref 0.1–1.5)
BUN SERPL-MCNC: 32 MG/DL (ref 8–22)
CALCIUM SERPL-MCNC: 9.6 MG/DL (ref 8.5–10.5)
CHLORIDE SERPL-SCNC: 104 MMOL/L (ref 96–112)
CO2 SERPL-SCNC: 26 MMOL/L (ref 20–33)
CREAT SERPL-MCNC: 0.89 MG/DL (ref 0.5–1.4)
GLOBULIN SER CALC-MCNC: 3.9 G/DL (ref 1.9–3.5)
GLUCOSE SERPL-MCNC: 97 MG/DL (ref 65–99)
POTASSIUM SERPL-SCNC: 4 MMOL/L (ref 3.6–5.5)
PROT SERPL-MCNC: 8.1 G/DL (ref 6–8.2)
SODIUM SERPL-SCNC: 139 MMOL/L (ref 135–145)
TSH SERPL DL<=0.005 MIU/L-ACNC: 0.94 UIU/ML (ref 0.38–5.33)

## 2019-01-30 PROCEDURE — 36415 COLL VENOUS BLD VENIPUNCTURE: CPT

## 2019-01-30 PROCEDURE — 84443 ASSAY THYROID STIM HORMONE: CPT

## 2019-01-30 PROCEDURE — 80053 COMPREHEN METABOLIC PANEL: CPT

## 2019-01-30 PROCEDURE — 86480 TB TEST CELL IMMUN MEASURE: CPT

## 2019-02-01 LAB
GAMMA INTERFERON BACKGROUND BLD IA-ACNC: 0.06 IU/ML
M TB IFN-G BLD-IMP: NEGATIVE
M TB IFN-G CD4+ BCKGRND COR BLD-ACNC: 0 IU/ML
MITOGEN IGNF BCKGRD COR BLD-ACNC: >10 IU/ML
QFT TB2 - NIL TBQ2: -0.01 IU/ML

## 2019-02-28 ENCOUNTER — OFFICE VISIT (OUTPATIENT)
Dept: MEDICAL GROUP | Facility: MEDICAL CENTER | Age: 74
End: 2019-02-28
Payer: MEDICARE

## 2019-02-28 VITALS
SYSTOLIC BLOOD PRESSURE: 114 MMHG | WEIGHT: 163 LBS | TEMPERATURE: 98 F | DIASTOLIC BLOOD PRESSURE: 62 MMHG | BODY MASS INDEX: 27.83 KG/M2 | OXYGEN SATURATION: 95 % | HEART RATE: 63 BPM | HEIGHT: 64 IN

## 2019-02-28 DIAGNOSIS — E78.5 DYSLIPIDEMIA: ICD-10-CM

## 2019-02-28 DIAGNOSIS — Z00.00 MEDICARE ANNUAL WELLNESS VISIT, SUBSEQUENT: ICD-10-CM

## 2019-02-28 DIAGNOSIS — R42 DIZZINESS: ICD-10-CM

## 2019-02-28 DIAGNOSIS — E03.9 ACQUIRED HYPOTHYROIDISM: ICD-10-CM

## 2019-02-28 DIAGNOSIS — I10 HTN (HYPERTENSION), BENIGN: Chronic | ICD-10-CM

## 2019-02-28 PROCEDURE — 99213 OFFICE O/P EST LOW 20 MIN: CPT | Mod: 25 | Performed by: INTERNAL MEDICINE

## 2019-02-28 PROCEDURE — G0439 PPPS, SUBSEQ VISIT: HCPCS | Performed by: INTERNAL MEDICINE

## 2019-02-28 RX ORDER — HYDROCHLOROTHIAZIDE 12.5 MG/1
12.5 CAPSULE, GELATIN COATED ORAL DAILY
Qty: 90 CAP | Refills: 0
Start: 2019-02-28 | End: 2019-03-11

## 2019-02-28 ASSESSMENT — ACTIVITIES OF DAILY LIVING (ADL): BATHING_REQUIRES_ASSISTANCE: 0

## 2019-02-28 ASSESSMENT — ENCOUNTER SYMPTOMS: GENERAL WELL-BEING: POOR

## 2019-02-28 ASSESSMENT — PATIENT HEALTH QUESTIONNAIRE - PHQ9: CLINICAL INTERPRETATION OF PHQ2 SCORE: 0

## 2019-02-28 NOTE — PROGRESS NOTES
CC: Dizziness, due for wellness examination.    HPI:   Alton presents today with the following.      1. Medicare annual wellness visit, subsequent  Screenings performed below information on advance directives at home she will bring in a copy.    2. Dizziness  Complaint today is dizziness and transient nausea.  She is got a history of vertigo however reports symptoms are different.  Typically occurs when she stands up from a seated position.  Lasting a brief second does have some mild nausea.  No chest pains or palpitations.  She is maintained on dual blood pressure therapy blood pressure is somewhat low in office today.          Information for advance directives given to patient or instructed to bring in advance directives into to office to put in chart.      Depression Screening    Little interest or pleasure in doing things?  0 - not at all  Feeling down, depressed , or hopeless? 0 - not at all  Patient Health Questionnaire Score: 0     If depressive symptoms identified deferred to follow up visit unless specifically addressed in assessment and plan.    Interpretation of PHQ-9 Total Score   Score Severity   1-4 No Depression   5-9 Mild Depression   10-14 Moderate Depression   15-19 Moderately Severe Depression   20-27 Severe Depression    Screening for Cognitive Impairment    Three Minute Recall (leader, season, table) 3/3    Matti clock face with all 12 numbers and set the hands to show 10 past 11.  Yes 5/5  Cognitive concerns identified deferred for follow up unless specifically addressed in assessment and plan.    Fall Risk Assessment    Has the patient had two or more falls in the last year or any fall with injury in the last year?  No    Safety Assessment    Throw rugs on floor.  Yes   Cautioned about securing or removing.    Handrails on all stairs.  Yes  Good lighting in all hallways.  Yes  Difficulty hearing.  No  Patient counseled about all safety risks that were identified.    Functional Assessment  ADLs    Are there any barriers preventing you from cooking for yourself or meeting nutritional needs?  No.    Are there any barriers preventing you from driving safely or obtaining transportation?  No.    Are there any barriers preventing you from using a telephone or calling for help?  No.    Are there any barriers preventing you from shopping?  No.    Are there any barriers preventing you from taking care of your own finances?  No.    Are there any barriers preventing you from managing your medications?  No.    Are there any barriers preventing you from showering, bathing or dressing yourself?  No.    Are you currently engaging in any exercise or physical activity?  No.     What is your perception of your health?  Poor.      Health Maintenance Summary                BONE DENSITY Overdue 6/20/2010     COLON CANCER SCREENING ANNUAL FIT Overdue 6/21/2015      Done 6/21/2014 OCCULT BLOOD FECES IMMUNOASSAY    Annual Wellness Visit Overdue 7/30/2017      Done 7/29/2016 Visit Dx: Medicare annual wellness visit, subsequent     Patient has more history with this topic...    IMM INFLUENZA Postponed 4/26/2019 Originally 9/1/2018. Patient Refused    IMM DTaP/Tdap/Td Vaccine Postponed 10/18/2033 Originally 6/20/1964. Insurance/Financial    IMM ZOSTER VACCINES Postponed 10/19/2033 Originally 6/20/1995. Insurance/Financial    MAMMOGRAM Next Due 9/10/2019      Done 9/10/2018 MA-SCREENING MAMMO BILAT W/TOMOSYNTHESIS W/CAD     Patient has more history with this topic...          Patient Care Team:  Rufino Robles M.D. as PCP - General            Health Care Screening: Age-appropriate preventive services that Medicare covers were discussed today and ordered as indicated and agreed upon by the patient, and as recommended by USPTF and ACIP.     Patient Active Problem List    Diagnosis Date Noted   • Family history of breast cancer 04/21/2016   • Tinnitus 06/09/2015   • Acquired hypothyroidism 06/02/2015   • Dyslipidemia 10/25/2012    • DJD (degenerative joint disease) of cervical spine 07/18/2012   • HTN (hypertension), benign 04/22/2011       Current Outpatient Prescriptions   Medication Sig Dispense Refill   • hydrochlorothiazide (MICROZIDE) 12.5 MG capsule Take 1 Cap by mouth every day. 90 Cap 0   • fluticasone (FLONASE) 50 MCG/ACT nasal spray Spray 2 Sprays in nose every day. 1 Bottle 11   • hydroCHLOROthiazide (HYDRODIURIL) 25 MG Tab TAKE 1 TABLET BY MOUTH EVERY DAY 90 Tab 3   • losartan (COZAAR) 100 MG Tab TAKE ONE TABLET BY MOUTH ONCE DAILY*GENERIC FOR COZAAR* 90 Tab 3   • levothyroxine (SYNTHROID) 100 MCG Tab TAKE ONE TABLET BY MOUTH EVERY MORNING ON AN EMPTY STOMACH 90 Tab 3     No current facility-administered medications for this visit.        Family History   Problem Relation Age of Onset   • Hypertension Father    • Heart Disease Father    • Cancer Mother         breast   • Genetic Paternal Grandmother        Social History     Social History   • Marital status:      Spouse name: N/A   • Number of children: N/A   • Years of education: N/A     Occupational History   • Not on file.     Social History Main Topics   • Smoking status: Former Smoker     Packs/day: 0.75     Years: 32.00     Types: Cigarettes     Quit date: 7/11/1993   • Smokeless tobacco: Never Used      Comment: started at 16   • Alcohol use 0.0 oz/week      Comment: once a year   • Drug use: No   • Sexual activity: No     Other Topics Concern   • Not on file     Social History Narrative   • No narrative on file       Past Surgical History:   Procedure Laterality Date   • CERVICAL FUSION POSTERIOR  7/18/2012    Performed by LA NENA JENNINGS at [facility]   • CERVICAL DECOMPRESSION POSTERIOR  7/18/2012    Performed by LA NENA JENNINGS at [facility]   • CERVICAL FORAMINOTOMY  7/18/2012    Performed by LA NENA JENNINGS at [facility]   • INJ,EPIDURAL,CERV/THOR SINGLE  6/1/2012    Performed by ORLY ORTA at [facility]   • CHOLECYSTECTOMY     • GYN SURGERY      hysterectomy   • OTHER      T&A   • OTHER ORTHOPEDIC SURGERY      kaur bunionectomy       Allergies as of 02/28/2019 - Reviewed 02/28/2019   Allergen Reaction Noted   • Penicillins  07/11/2012        ROS: Denies Chest pain, SOB, LE edema.    /62 (BP Location: Left arm, Patient Position: Sitting)   Pulse 63   Temp 36.7 °C (98 °F)   Ht 1.626 m (5' 4\")   Wt 73.9 kg (163 lb)   SpO2 95%   BMI 27.98 kg/m²     Physical Exam:  Gen:         Alert and oriented, No apparent distress.  Neck:        No Lymphadenopathy or Bruits.  Lungs:     Clear to auscultation bilaterally  CV:          Regular rate and rhythm. No murmurs, rubs or gallops.  Abd:         Soft non tender, non distended. Normal active bowel sounds.  No  Hepatosplenomegaly, No pulsatile masses.                   Ext:          No clubbing, cyanosis, edema.      Assessment and Plan.   73 y.o. female with the following issues.    1. Medicare annual wellness visit, subsequent  Discussed healthy lifestyle habits as well as screening regimens.  - Subsequent Annual Wellness Visit - Includes PPPS ()    2. Dizziness  Suspect related to orthostatic blood pressure issues cutting hydrochlorothiazide in half.  She will monitor blood pressure and symptoms and follow-up in 6 weeks.    3. HTN (hypertension), benign  Again lowering blood pressure medication as above.  - Subsequent Annual Wellness Visit - Includes PPPS ()    4. Dyslipidemia  Lipids currently well controlled. Discussed continued diet and exercise recheck 6 months to 1 year.  - Subsequent Annual Wellness Visit - Includes PPPS ()    5. Acquired hypothyroidism  Currently adequately replaced, recheck 6 months to one year.  - Subsequent Annual Wellness Visit - Includes PPPS ()        Referrals offered: Community-based lifestyle interventions to reduce health risks and promote self-management and wellness, fall prevention, nutrition, " physical activity, tobacco-use cessation, weight loss, and mental health services as per orders if indicated.    Discussion today about general wellness and lifestyle habits:    · Prevent falls and reduce trip hazards; Cautioned about securing or removing rugs.  · Have a working fire alarm and carbon monoxide detector;   · Engage in regular physical activity and social activities

## 2019-03-06 ENCOUNTER — APPOINTMENT (OUTPATIENT)
Dept: RADIOLOGY | Facility: MEDICAL CENTER | Age: 74
End: 2019-03-06
Attending: EMERGENCY MEDICINE
Payer: MEDICARE

## 2019-03-06 ENCOUNTER — HOSPITAL ENCOUNTER (EMERGENCY)
Facility: MEDICAL CENTER | Age: 74
End: 2019-03-06
Attending: EMERGENCY MEDICINE
Payer: MEDICARE

## 2019-03-06 ENCOUNTER — TELEPHONE (OUTPATIENT)
Dept: MEDICAL GROUP | Facility: MEDICAL CENTER | Age: 74
End: 2019-03-06

## 2019-03-06 VITALS
WEIGHT: 163.58 LBS | BODY MASS INDEX: 28.08 KG/M2 | HEART RATE: 72 BPM | OXYGEN SATURATION: 94 % | RESPIRATION RATE: 17 BRPM | DIASTOLIC BLOOD PRESSURE: 78 MMHG | TEMPERATURE: 98.4 F | SYSTOLIC BLOOD PRESSURE: 167 MMHG

## 2019-03-06 DIAGNOSIS — K62.5 RECTAL BLEEDING: ICD-10-CM

## 2019-03-06 DIAGNOSIS — E27.8 ADRENAL NODULE (HCC): ICD-10-CM

## 2019-03-06 LAB
ALBUMIN SERPL BCP-MCNC: 4.1 G/DL (ref 3.2–4.9)
ALBUMIN/GLOB SERPL: 1.4 G/DL
ALP SERPL-CCNC: 125 U/L (ref 30–99)
ALT SERPL-CCNC: 46 U/L (ref 2–50)
ANION GAP SERPL CALC-SCNC: 8 MMOL/L (ref 0–11.9)
AST SERPL-CCNC: 46 U/L (ref 12–45)
BASOPHILS # BLD AUTO: 0.3 % (ref 0–1.8)
BASOPHILS # BLD: 0.05 K/UL (ref 0–0.12)
BILIRUB SERPL-MCNC: 0.6 MG/DL (ref 0.1–1.5)
BUN SERPL-MCNC: 27 MG/DL (ref 8–22)
CALCIUM SERPL-MCNC: 9.6 MG/DL (ref 8.4–10.2)
CHLORIDE SERPL-SCNC: 108 MMOL/L (ref 96–112)
CO2 SERPL-SCNC: 22 MMOL/L (ref 20–33)
CREAT SERPL-MCNC: 0.75 MG/DL (ref 0.5–1.4)
EOSINOPHIL # BLD AUTO: 0.03 K/UL (ref 0–0.51)
EOSINOPHIL NFR BLD: 0.2 % (ref 0–6.9)
ERYTHROCYTE [DISTWIDTH] IN BLOOD BY AUTOMATED COUNT: 42.5 FL (ref 35.9–50)
GLOBULIN SER CALC-MCNC: 3 G/DL (ref 1.9–3.5)
GLUCOSE SERPL-MCNC: 109 MG/DL (ref 65–99)
HCT VFR BLD AUTO: 43.3 % (ref 37–47)
HGB BLD-MCNC: 14.6 G/DL (ref 12–16)
IMM GRANULOCYTES # BLD AUTO: 0.05 K/UL (ref 0–0.11)
IMM GRANULOCYTES NFR BLD AUTO: 0.3 % (ref 0–0.9)
LYMPHOCYTES # BLD AUTO: 1.64 K/UL (ref 1–4.8)
LYMPHOCYTES NFR BLD: 9.5 % (ref 22–41)
MCH RBC QN AUTO: 31.5 PG (ref 27–33)
MCHC RBC AUTO-ENTMCNC: 33.7 G/DL (ref 33.6–35)
MCV RBC AUTO: 93.3 FL (ref 81.4–97.8)
MONOCYTES # BLD AUTO: 0.94 K/UL (ref 0–0.85)
MONOCYTES NFR BLD AUTO: 5.4 % (ref 0–13.4)
NEUTROPHILS # BLD AUTO: 14.58 K/UL (ref 2–7.15)
NEUTROPHILS NFR BLD: 84.3 % (ref 44–72)
NRBC # BLD AUTO: 0 K/UL
NRBC BLD-RTO: 0 /100 WBC
PLATELET # BLD AUTO: 275 K/UL (ref 164–446)
PMV BLD AUTO: 10 FL (ref 9–12.9)
POTASSIUM SERPL-SCNC: 3.7 MMOL/L (ref 3.6–5.5)
PROT SERPL-MCNC: 7.1 G/DL (ref 6–8.2)
RBC # BLD AUTO: 4.64 M/UL (ref 4.2–5.4)
SODIUM SERPL-SCNC: 138 MMOL/L (ref 135–145)
WBC # BLD AUTO: 17.3 K/UL (ref 4.8–10.8)

## 2019-03-06 PROCEDURE — 74177 CT ABD & PELVIS W/CONTRAST: CPT

## 2019-03-06 PROCEDURE — 700117 HCHG RX CONTRAST REV CODE 255: Performed by: EMERGENCY MEDICINE

## 2019-03-06 PROCEDURE — 85025 COMPLETE CBC W/AUTO DIFF WBC: CPT

## 2019-03-06 PROCEDURE — 36415 COLL VENOUS BLD VENIPUNCTURE: CPT

## 2019-03-06 PROCEDURE — 99284 EMERGENCY DEPT VISIT MOD MDM: CPT

## 2019-03-06 PROCEDURE — 80053 COMPREHEN METABOLIC PANEL: CPT

## 2019-03-06 RX ADMIN — IOHEXOL 100 ML: 350 INJECTION, SOLUTION INTRAVENOUS at 21:55

## 2019-03-06 ASSESSMENT — PAIN SCALES - WONG BAKER: WONGBAKER_NUMERICALRESPONSE: DOESN'T HURT AT ALL

## 2019-03-06 NOTE — LETTER
March 6, 2019        Alton Childs    Please excuse work until 3/15/2019.  May return as able.                               Rufino Robles MD

## 2019-03-06 NOTE — TELEPHONE ENCOUNTER
VOICEMAIL  1. Caller Name: Alton                      Call Back Number: [phone]    2. Message: Patient's BP has gone back up.  Sunday-178 Monday-172/78 Tuesday-172/68    She was wondering if you wanted to change or increase her medication again. Please advise.    3. Patient approves office to leave a detailed voicemail/MyChart message: N\A

## 2019-03-06 NOTE — TELEPHONE ENCOUNTER
"Spoke with patient and her BP was 142/71. She is still having the dizziness and the feeling of \"not being here.\" She was wondering if you still would like her to take a whole HCTZ. She was also wondering if she can get a letter for work to be off until 03/15 for the dizziness. She will go back to work on 03/15. Please advise.  "

## 2019-03-07 NOTE — ED NOTES
ERP at bedside to discuss results and perform rectal exam. This Rn present as chaperone. Pt tolerated well.

## 2019-03-07 NOTE — ED PROVIDER NOTES
ED Provider Note    CHIEF COMPLAINT  Chief Complaint   Patient presents with   • Constipation     Pt c/o constipation x 2 days. Pt states OTC enema today with positive results. Pt c/o rectal bleeding after enema.       HPI  Alton Childs is a 73 y.o. female here for evaluation of constipation and rectal bleeding.  The patient states that she has been having issues with constipation, and that earlier in the day, she gave herself a fleets enema.  She states that this did not work, so she attempted to digitally disimpact herself, and noticed that when she was doing so she caused some rectal bleeding.  The patient states that she has no abdominal pain, no chest pain, no shortness of breath.  She states she does have history of hemorrhoids, and is worried that she may have torn some tissue in this area.  She currently has no rectal pain, and no other medical concerns at this time.  She does have history of constipation, and has not had anything like this in the past.    PAST MEDICAL HISTORY   has a past medical history of Arthritis; Heart valve disease; HTN (hypertension), benign (4/22/2011); and Hypothyroid (4/22/2011).    SOCIAL HISTORY  Social History     Social History Main Topics   • Smoking status: Former Smoker     Packs/day: 0.75     Years: 32.00     Types: Cigarettes     Quit date: 7/11/1993   • Smokeless tobacco: Never Used      Comment: started at 16   • Alcohol use 0.0 oz/week      Comment: once a year   • Drug use: No   • Sexual activity: No       Family History  No bleeding disorders    SURGICAL HISTORY   has a past surgical history that includes cholecystectomy; inj,epidural,cerv/thor single (6/1/2012); gyn surgery; other orthopedic surgery; other; cervical fusion posterior (7/18/2012); cervical decompression posterior (7/18/2012); and cervical foraminotomy (7/18/2012).    CURRENT MEDICATIONS  Home Medications    **Home medications have not yet been reviewed for this encounter**    "      ALLERGIES  Allergies   Allergen Reactions   • Penicillins      'NASTY\"       REVIEW OF SYSTEMS  See HPI for further details. Review of systems as above, otherwise all other systems are negative.     PHYSICAL EXAM  Constitutional: Well developed, well nourished. No acute distress.  HEENT: Normocephalic, atraumatic. Posterior pharynx clear and moist.  Eyes:  EOMI. Normal sclera.  Neck: Supple, Full range of motion, nontender.  Chest/Pulmonary: clear to ausculation. Symmetrical expansion.   Cardio: Regular rate and rhythm with no murmur.   Abdomen: Soft, nontender. No peritoneal signs. No guarding. No palpable masses.  Rectal;good tone, no blood, external hemorrhoid noted.  Musculoskeletal: No deformity, no edema, neurovascular intact.   Neuro: Clear speech, appropriate, cooperative, cranial nerves II-XII grossly intact.  Psych: Normal mood and affect    Results for orders placed or performed during the hospital encounter of 03/06/19   CBC WITH DIFFERENTIAL   Result Value Ref Range    WBC 17.3 (H) 4.8 - 10.8 K/uL    RBC 4.64 4.20 - 5.40 M/uL    Hemoglobin 14.6 12.0 - 16.0 g/dL    Hematocrit 43.3 37.0 - 47.0 %    MCV 93.3 81.4 - 97.8 fL    MCH 31.5 27.0 - 33.0 pg    MCHC 33.7 33.6 - 35.0 g/dL    RDW 42.5 35.9 - 50.0 fL    Platelet Count 275 164 - 446 K/uL    MPV 10.0 9.0 - 12.9 fL    Neutrophils-Polys 84.30 (H) 44.00 - 72.00 %    Lymphocytes 9.50 (L) 22.00 - 41.00 %    Monocytes 5.40 0.00 - 13.40 %    Eosinophils 0.20 0.00 - 6.90 %    Basophils 0.30 0.00 - 1.80 %    Immature Granulocytes 0.30 0.00 - 0.90 %    Nucleated RBC 0.00 /100 WBC    Neutrophils (Absolute) 14.58 (H) 2.00 - 7.15 K/uL    Lymphs (Absolute) 1.64 1.00 - 4.80 K/uL    Monos (Absolute) 0.94 (H) 0.00 - 0.85 K/uL    Eos (Absolute) 0.03 0.00 - 0.51 K/uL    Baso (Absolute) 0.05 0.00 - 0.12 K/uL    Immature Granulocytes (abs) 0.05 0.00 - 0.11 K/uL    NRBC (Absolute) 0.00 K/uL   COMP METABOLIC PANEL   Result Value Ref Range    Sodium 138 135 - 145 mmol/L "    Potassium 3.7 3.6 - 5.5 mmol/L    Chloride 108 96 - 112 mmol/L    Co2 22 20 - 33 mmol/L    Anion Gap 8.0 0.0 - 11.9    Glucose 109 (H) 65 - 99 mg/dL    Bun 27 (H) 8 - 22 mg/dL    Creatinine 0.75 0.50 - 1.40 mg/dL    Calcium 9.6 8.4 - 10.2 mg/dL    AST(SGOT) 46 (H) 12 - 45 U/L    ALT(SGPT) 46 2 - 50 U/L    Alkaline Phosphatase 125 (H) 30 - 99 U/L    Total Bilirubin 0.6 0.1 - 1.5 mg/dL    Albumin 4.1 3.2 - 4.9 g/dL    Total Protein 7.1 6.0 - 8.2 g/dL    Globulin 3.0 1.9 - 3.5 g/dL    A-G Ratio 1.4 g/dL   ESTIMATED GFR   Result Value Ref Range    GFR If African American >60 >60 mL/min/1.73 m 2    GFR If Non African American >60 >60 mL/min/1.73 m 2     CT-ABDOMEN-PELVIS WITH   Final Result         1.  No acute abnormality.   2.  Indeterminate left adrenal nodule, recommend follow-up adrenal protocol CT or MRI for further characterization.   3.  Hepatomegaly   4.  Small fat-containing umbilical hernia   5.  Atherosclerosis   6.  Diverticulosis            PROCEDURES     MEDICAL RECORD  I have reviewed patient's medical record and pertinent results are listed above.    COURSE & MEDICAL DECISION MAKING  I have reviewed any medical record information, laboratory studies and radiographic results as noted above.    The pt states that she currently has no pain, and is comfortable.  We discussed her wbc count, and she sates it is always elevated.      10:55 PM  At this time, the patient is nontoxic-appearing, afebrile, and appears comfortable.  She is guaiac negative on exam, and has a negative acute finding on CT.  We discussed to the adrenal nodule, and she will need to get follow-up for this in a timely manner.  She has not had any further bleeding issues at this time, and I feel as though the bleeding that occurred earlier tonight was a result digital trauma while she was disimpacting herself.    If you have had any blood pressure issues while here in the emergency department, please see your doctor for a further  evaluation or work up.    Differential diagnoses include but not limited to: Colitis, diverticulitis, hemorrhoid, fissure    This patient presents with rectal bleeding .  At this time, I have counseled the patient/family regarding their medications, pain control, and follow up.  They will continue their medications, if any, as prescribed.  They will return immediately for any worsening symptoms and/or any other medical concerns.  They will see their doctor, or contact the doctor provided, in 1-2 days for follow up.       FINAL IMPRESSION  Rectal bleeding -resolved      Electronically signed by: Karthik Schmid, 3/6/2019 7:58 PM

## 2019-03-07 NOTE — ED TRIAGE NOTES
Chief Complaint   Patient presents with   • Constipation     Pt c/o constipation x 2 days. Pt states OTC enema today with positive results. Pt c/o rectal bleeding after enema.     BP (!) 167/78   Pulse 61   Temp 36.8 °C (98.3 °F) (Temporal)   Resp 16   Wt 74.2 kg (163 lb 9.3 oz)   SpO2 96%   BMI 28.08 kg/m²

## 2019-03-11 ENCOUNTER — OFFICE VISIT (OUTPATIENT)
Dept: MEDICAL GROUP | Facility: MEDICAL CENTER | Age: 74
End: 2019-03-11
Payer: MEDICARE

## 2019-03-11 VITALS
HEART RATE: 72 BPM | BODY MASS INDEX: 27.14 KG/M2 | OXYGEN SATURATION: 93 % | WEIGHT: 159 LBS | HEIGHT: 64 IN | RESPIRATION RATE: 16 BRPM | SYSTOLIC BLOOD PRESSURE: 136 MMHG | TEMPERATURE: 98.7 F | DIASTOLIC BLOOD PRESSURE: 68 MMHG

## 2019-03-11 DIAGNOSIS — R42 DIZZINESS: ICD-10-CM

## 2019-03-11 DIAGNOSIS — D72.829 LEUKOCYTOSIS, UNSPECIFIED TYPE: ICD-10-CM

## 2019-03-11 DIAGNOSIS — I10 HTN (HYPERTENSION), BENIGN: Chronic | ICD-10-CM

## 2019-03-11 PROCEDURE — 99214 OFFICE O/P EST MOD 30 MIN: CPT | Performed by: INTERNAL MEDICINE

## 2019-03-11 RX ORDER — BENZONATATE 100 MG/1
100 CAPSULE ORAL 3 TIMES DAILY PRN
Qty: 30 CAP | Refills: 0 | Status: SHIPPED | OUTPATIENT
Start: 2019-03-11 | End: 2019-03-27

## 2019-03-11 NOTE — PROGRESS NOTES
CC: Follow-up dizziness, hypertension, elevated white count.    HPI:   Alton presents today with the following.    1. Dizziness  Presents reporting persistent dizziness.  She is somewhat of a poor historian initially thought to be orthostatic but now reports it is more when she is up moving around and may be related to head movement she denies fainting she does not report is spinning she reports its unstable on her feet.  She does have occasional orthostatic components but her blood pressure medication when lowered did not improve her symptomology.    2. HTN (hypertension), benign  The pressures did go up considerably on a half dose medication again dizziness persisted therefore unlikely related.    3. Leukocytosis, unspecified type  She was in the emergency room for bloody stool thought to be hemorrhoids she was found to have a white count of 17.  Reviewing records previous white counts have been in a normal range.  She denies any fevers or chills no night sweats.      Patient Active Problem List    Diagnosis Date Noted   • Family history of breast cancer 04/21/2016   • Tinnitus 06/09/2015   • Acquired hypothyroidism 06/02/2015   • Dyslipidemia 10/25/2012   • DJD (degenerative joint disease) of cervical spine 07/18/2012   • HTN (hypertension), benign 04/22/2011       Current Outpatient Prescriptions   Medication Sig Dispense Refill   • benzonatate (TESSALON) 100 MG Cap Take 1 Cap by mouth 3 times a day as needed for Cough. 30 Cap 0   • fluticasone (FLONASE) 50 MCG/ACT nasal spray Spray 2 Sprays in nose every day. 1 Bottle 11   • hydroCHLOROthiazide (HYDRODIURIL) 25 MG Tab TAKE 1 TABLET BY MOUTH EVERY DAY 90 Tab 3   • losartan (COZAAR) 100 MG Tab TAKE ONE TABLET BY MOUTH ONCE DAILY*GENERIC FOR COZAAR* 90 Tab 3   • levothyroxine (SYNTHROID) 100 MCG Tab TAKE ONE TABLET BY MOUTH EVERY MORNING ON AN EMPTY STOMACH 90 Tab 3     No current facility-administered medications for this visit.          Allergies as of  "03/11/2019 - Reviewed 03/11/2019   Allergen Reaction Noted   • Penicillins  07/11/2012        ROS: Denies Chest pain, SOB, LE edema.    /68 (BP Location: Left arm)   Pulse 72   Temp 37.1 °C (98.7 °F)   Resp 16   Ht 1.626 m (5' 4\")   Wt 72.1 kg (159 lb)   SpO2 93%   BMI 27.29 kg/m²     Physical Exam:  Gen:         Alert and oriented, No apparent distress.  Neck:        No Lymphadenopathy or Bruits.  Lungs:     Clear to auscultation bilaterally  CV:          Regular rate and rhythm. No murmurs, rubs or gallops.               Ext:          No clubbing, cyanosis, edema.      Assessment and Plan.   73 y.o. female with the following issues.    1. Dizziness  Multiple factorial some orthostatic some possible vertiginous have referred to neurology and written for carotid ultrasound.  - US-CAROTID DOPPLER BILAT; Future  - REFERRAL TO NEUROLOGY    2. HTN (hypertension), benign  Blood pressure went up with half dose medications going back to full pill daily.    3. Leukocytosis, unspecified type  Ernie CBC if persistently elevated will refer to hematology.  - CBC WITH DIFFERENTIAL; Future      "

## 2019-03-15 ENCOUNTER — HOSPITAL ENCOUNTER (OUTPATIENT)
Dept: LAB | Facility: MEDICAL CENTER | Age: 74
End: 2019-03-15
Attending: INTERNAL MEDICINE
Payer: MEDICARE

## 2019-03-15 DIAGNOSIS — D72.829 LEUKOCYTOSIS, UNSPECIFIED TYPE: ICD-10-CM

## 2019-03-15 LAB
BASOPHILS # BLD AUTO: 0.5 % (ref 0–1.8)
BASOPHILS # BLD: 0.06 K/UL (ref 0–0.12)
EOSINOPHIL # BLD AUTO: 0.08 K/UL (ref 0–0.51)
EOSINOPHIL NFR BLD: 0.6 % (ref 0–6.9)
ERYTHROCYTE [DISTWIDTH] IN BLOOD BY AUTOMATED COUNT: 43.2 FL (ref 35.9–50)
HCT VFR BLD AUTO: 48.1 % (ref 37–47)
HGB BLD-MCNC: 15.5 G/DL (ref 12–16)
IMM GRANULOCYTES # BLD AUTO: 0.18 K/UL (ref 0–0.11)
IMM GRANULOCYTES NFR BLD AUTO: 1.4 % (ref 0–0.9)
LYMPHOCYTES # BLD AUTO: 2.49 K/UL (ref 1–4.8)
LYMPHOCYTES NFR BLD: 19.5 % (ref 22–41)
MCH RBC QN AUTO: 31.6 PG (ref 27–33)
MCHC RBC AUTO-ENTMCNC: 32.2 G/DL (ref 33.6–35)
MCV RBC AUTO: 98.2 FL (ref 81.4–97.8)
MONOCYTES # BLD AUTO: 0.94 K/UL (ref 0–0.85)
MONOCYTES NFR BLD AUTO: 7.4 % (ref 0–13.4)
NEUTROPHILS # BLD AUTO: 9.01 K/UL (ref 2–7.15)
NEUTROPHILS NFR BLD: 70.6 % (ref 44–72)
NRBC # BLD AUTO: 0 K/UL
NRBC BLD-RTO: 0 /100 WBC
PLATELET # BLD AUTO: 357 K/UL (ref 164–446)
PMV BLD AUTO: 10.5 FL (ref 9–12.9)
RBC # BLD AUTO: 4.9 M/UL (ref 4.2–5.4)
WBC # BLD AUTO: 12.8 K/UL (ref 4.8–10.8)

## 2019-03-15 PROCEDURE — 36415 COLL VENOUS BLD VENIPUNCTURE: CPT

## 2019-03-15 PROCEDURE — 85025 COMPLETE CBC W/AUTO DIFF WBC: CPT

## 2019-03-18 DIAGNOSIS — D72.829 LEUKOCYTOSIS, UNSPECIFIED TYPE: ICD-10-CM

## 2019-03-27 ENCOUNTER — OFFICE VISIT (OUTPATIENT)
Dept: MEDICAL GROUP | Facility: PHYSICIAN GROUP | Age: 74
End: 2019-03-27
Payer: MEDICARE

## 2019-03-27 VITALS
TEMPERATURE: 98 F | SYSTOLIC BLOOD PRESSURE: 122 MMHG | DIASTOLIC BLOOD PRESSURE: 76 MMHG | OXYGEN SATURATION: 93 % | BODY MASS INDEX: 27.83 KG/M2 | HEART RATE: 72 BPM | HEIGHT: 64 IN | WEIGHT: 163 LBS | RESPIRATION RATE: 14 BRPM

## 2019-03-27 DIAGNOSIS — I95.1 ORTHOSTATIC HYPOTENSION: ICD-10-CM

## 2019-03-27 DIAGNOSIS — D72.829 LEUKOCYTOSIS, UNSPECIFIED TYPE: ICD-10-CM

## 2019-03-27 DIAGNOSIS — E03.9 ACQUIRED HYPOTHYROIDISM: ICD-10-CM

## 2019-03-27 DIAGNOSIS — R73.01 ELEVATED FASTING GLUCOSE: ICD-10-CM

## 2019-03-27 DIAGNOSIS — R42 DIZZINESS: ICD-10-CM

## 2019-03-27 DIAGNOSIS — I10 HTN (HYPERTENSION), BENIGN: Chronic | ICD-10-CM

## 2019-03-27 DIAGNOSIS — J30.89 ENVIRONMENTAL AND SEASONAL ALLERGIES: ICD-10-CM

## 2019-03-27 PROCEDURE — 99214 OFFICE O/P EST MOD 30 MIN: CPT | Performed by: PHYSICIAN ASSISTANT

## 2019-04-01 PROBLEM — J30.89 ENVIRONMENTAL AND SEASONAL ALLERGIES: Status: ACTIVE | Noted: 2019-04-01

## 2019-04-01 NOTE — PROGRESS NOTES
Chief Complaint   Patient presents with   • Blood Pressure Problem   • Results     Labs    • Lightheadedness     x 1 week; not as bad today    • Cough     x 7 months; comes and goes        HISTORY OF PRESENT ILLNESS: Alton Childs is an established 73 y.o. female here to discuss the evaluation and management of:    Patient is a pleasant 73-year-old female here today to establish care.  She has a positive medical history for acquired hypothyroidism, hypertension, environmental and seasonal allergies.  She has been following up with her provider Dr. Robles in regards lightheadedness.  He ordered carotid ultrasound for patient to complete but she tells me she has not completed imaging.  Patient is currently taking losartan 100 mg tab once daily and hydrochlorothiazide 25 mg tab once daily for hypertension.  She tells me she is compliant with medication experiences no side effects or complications of medication.  States dizziness developed in the middle of February and notices it with positional changes.  Patient's initial blood pressure reading was 158/88 mmHg with sitting.  Orthostatic blood pressure was taken during today's appointment.  Laying 152/84 mmHg, standing for 1 minute 140/80 mmHg, standing for 3 minutes 122/76 mHg.  He tells me that she has been monitoring blood pressure at home but stopped 2 days ago due to having elevated readings.  States on average systolic is 160-170 mmHg and diastolic 60-80 mmHg.  Patient denies syncope, chest pain, shortness of breath, heart palpitations, peripheral edema, vision changes, severe headache, orthopnea.    Hypothyroidism is managed with Synthroid 100 MCG tab once daily.  She tells me that she is compliant with medication experiences no side effects or complications of medication.  Will repeat lab work.    Patient is complaining of a dry cough this been intermittent for the past 7 months.  Admits to nasal congestion, rhinorrhea, postnasal drip.  States she has a  tickling sensation in the back of her throat and then experiences a cough.  States she is been using Flonase at night with some improvement of symptoms.  Patient is inquiring about treatment options.    Patient Active Problem List    Diagnosis Date Noted   • Family history of breast cancer 2016   • Tinnitus 2015   • Acquired hypothyroidism 2015   • Dyslipidemia 10/25/2012   • DJD (degenerative joint disease) of cervical spine 2012   • HTN (hypertension), benign 2011       Allergies:Penicillins    Current Outpatient Prescriptions   Medication Sig Dispense Refill   • hydroCHLOROthiazide (HYDRODIURIL) 25 MG Tab TAKE 1 TABLET BY MOUTH EVERY DAY 90 Tab 3   • losartan (COZAAR) 100 MG Tab TAKE ONE TABLET BY MOUTH ONCE DAILY*GENERIC FOR COZAAR* 90 Tab 3   • levothyroxine (SYNTHROID) 100 MCG Tab TAKE ONE TABLET BY MOUTH EVERY MORNING ON AN EMPTY STOMACH 90 Tab 3   • fluticasone (FLONASE) 50 MCG/ACT nasal spray Spray 2 Sprays in nose every day. 1 Bottle 11     No current facility-administered medications for this visit.        Social History   Substance Use Topics   • Smoking status: Former Smoker     Packs/day: 0.75     Years: 32.00     Types: Cigarettes     Quit date: 1993   • Smokeless tobacco: Never Used      Comment: started at 16   • Alcohol use 0.0 oz/week      Comment: once a year       Family Status   Relation Status   • Fa  at age 40   • Mo Alive        89 in 2016   • PGMo (Not Specified)     Family History   Problem Relation Age of Onset   • Hypertension Father    • Heart Disease Father    • Cancer Mother         breast   • Genetic Paternal Grandmother        ROS:  Review of Systems   Constitutional: Negative for fever, chills, weight loss and malaise/fatigue.   HENT: Negative for ear pain, nosebleeds, congestion, sore throat and neck pain.  Positive for nasal congestion, rhinorrhea, postnasal drip.     Eyes: Negative for blurred vision.   Respiratory: Negative for  "shortness of breath and wheezing.  Positive for dry nonproductive cough.  Cardiovascular: Negative for chest pain, palpitations, orthopnea and leg swelling.   Gastrointestinal: Negative for heartburn, nausea, vomiting and abdominal pain.   Genitourinary: Negative for dysuria, urgency and frequency.   Musculoskeletal: Negative for myalgias, back pain and joint pain.   Skin: Negative for rash and itching.   Neurological: Negative for tingling, tremors, sensory change, focal weakness and headaches.  Positive for dizziness.  Endo/Heme/Allergies: Does not bruise/bleed easily.   Psychiatric/Behavioral: Negative for depression, suicidal ideas and memory loss.  The patient is not nervous/anxious and does not have insomnia.    All other systems reviewed and are negative except as in HPI.    Exam: Blood pressure 122/76, pulse 72, temperature 36.7 °C (98 °F), temperature source Temporal, resp. rate 14, height 1.626 m (5' 4\"), weight 73.9 kg (163 lb), SpO2 93 %, not currently breastfeeding. Body mass index is 27.98 kg/m².  General: Normal appearing. No distress.  HEENT: Normocephalic. Eyes conjunctiva clear lids without ptosis, pupils equal and reactive to light accommodation, ears normal shape and contour, canals are clear bilaterally, tympanic membranes are benign, nasal mucosa erythematous and boggy, oropharynx is without erythema, edema or exudates.   Neck: Supple without JVD or bruit. Thyroid is not enlarged.  Pulmonary: Clear to ausculation.  Normal effort. No rales, ronchi, or wheezing.  Cardiovascular: Regular rate and rhythm without murmur.   Abdomen: Soft, nontender, nondistended. Normal bowel sounds. Liver and spleen are not palpable  Neurologic: Grossly nonfocal.  Cranial nerves are normal.   Lymph: No cervical, supraclavicular or axillary lymph nodes are palpable  Skin: Warm and dry.  No rashes or suspicious skin lesions.  Musculoskeletal: Normal gait. No extremity cyanosis, clubbing, or edema.  Psych: Normal mood " and affect. Alert and oriented x3. Judgment and insight is normal.    Medical decision-making and discussion:  1. HTN (hypertension), benign   Patient's initial blood pressure reading was 158/88 mmHg with sitting.  Orthostatic blood pressure was taken during today's appointment.  Laying 152/84 mmHg, standing for 1 minute 140/80 mmHg, standing for 3 minutes 122/76 mHg.    Chronic ultrasounds have been ordered but patient has not completed.  Patient will be referred to cardiology for further evaluation of orthostatic hypertension.  Advised patient to move slowly with positional changes.  Continue work on hydration.  No medication changes made at this time.  Patient currently taking losartan 100 mg tab once daily as well as hydrochlorthiazide 25 mg tab once daily.  Continue current medication regimen.  Continue to monitor.    Follow-up for worsening symptoms,lack of expected recovery, or should new symptoms or problems arise.    2. Acquired hypothyroidism  Continue thyroid medication. Instructed patient to take on empty stomach with glass of water, 30 minutes prior to food or other medications. Labs as indicated.    - TSH; Future    3. Elevated fasting glucose  Elevated fasting glucose on 3/6/2019.  Hemoglobin A1c has been ordered to further evaluate patient.  Patient will be contacted with results.  Emphasized benefits of healthy diet regular exercise routine with patient.    - HEMOGLOBIN A1C; Future    4. Leukocytosis, unspecified type  Continue to monitor.  White blood cell count is trending downward.  Repeat CBC.  Patient be contacted with results.  - CBC WITH DIFFERENTIAL; Future    5. Dizziness  Lab work has been ordered to further evaluate patient.  Suspect dizziness is due to orthostatic hypotension.    - HEMOGLOBIN A1C; Future  - TSH; Future  - CBC WITH DIFFERENTIAL; Future    6. Orthostatic hypotension  During today's appointment patient presented with orthostatic hypotension.     Patient's initial blood  pressure reading was 158/88 mmHg with sitting.  Orthostatic blood pressure was taken during today's appointment.  Laying 152/84 mmHg, standing for 1 minute 140/80 mmHg, standing for 3 minutes 122/76 mHg.    Patient has been referred to cardiology for further evaluation. Chronic ultrasounds have been ordered but patient has not completed.  Advised patient to move slowly with positional changes.  Continue work on hydration.  No medication changes made at this time.  Patient currently taking losartan 100 mg tab once daily as well as hydrochlorthiazide 25 mg tab once daily.  Continue current medication regimen.  Continue to monitor.    Follow-up for worsening symptoms,lack of expected recovery, or should new symptoms or problems arise.      - REFERRAL TO CARDIOLOGY    7. Environmental and seasonal allergies  Advised nasal saline and steroid sprays daily. OTC anti-histamines (Zyrtec) as needed. Encouraged allergen avoidence and environment modification when possible. If regular use not effective, may consider referral to allergist for immunotherapy.      Please note that this dictation was created using voice recognition software. I have made every reasonable attempt to correct obvious errors, but I expect that there are errors of grammar and possibly content that I did not discover before finalizing the note.        No Follow-up on file.

## 2019-05-28 RX ORDER — LOSARTAN POTASSIUM 100 MG/1
100 TABLET ORAL DAILY
Qty: 100 TAB | Refills: 3 | Status: SHIPPED | OUTPATIENT
Start: 2019-05-28 | End: 2020-06-10 | Stop reason: SDUPTHER

## 2019-12-18 ENCOUNTER — OCCUPATIONAL MEDICINE (OUTPATIENT)
Dept: URGENT CARE | Facility: CLINIC | Age: 74
End: 2019-12-18
Payer: COMMERCIAL

## 2019-12-18 ENCOUNTER — OFFICE VISIT (OUTPATIENT)
Dept: URGENT CARE | Facility: CLINIC | Age: 74
End: 2019-12-18
Payer: COMMERCIAL

## 2019-12-18 VITALS
HEART RATE: 76 BPM | BODY MASS INDEX: 23.39 KG/M2 | OXYGEN SATURATION: 96 % | DIASTOLIC BLOOD PRESSURE: 80 MMHG | TEMPERATURE: 97.5 F | WEIGHT: 137 LBS | RESPIRATION RATE: 14 BRPM | SYSTOLIC BLOOD PRESSURE: 136 MMHG | HEIGHT: 64 IN

## 2019-12-18 VITALS
OXYGEN SATURATION: 96 % | TEMPERATURE: 97.5 F | DIASTOLIC BLOOD PRESSURE: 80 MMHG | HEIGHT: 64 IN | WEIGHT: 137 LBS | BODY MASS INDEX: 23.39 KG/M2 | SYSTOLIC BLOOD PRESSURE: 136 MMHG | HEART RATE: 76 BPM

## 2019-12-18 DIAGNOSIS — S39.012A BACK STRAIN, INITIAL ENCOUNTER: ICD-10-CM

## 2019-12-18 DIAGNOSIS — S39.012A LOW BACK STRAIN, INITIAL ENCOUNTER: ICD-10-CM

## 2019-12-18 PROCEDURE — 99214 OFFICE O/P EST MOD 30 MIN: CPT | Performed by: FAMILY MEDICINE

## 2019-12-18 RX ORDER — BACLOFEN 10 MG/1
10 TABLET ORAL NIGHTLY PRN
Qty: 15 TAB | Refills: 0 | Status: SHIPPED | OUTPATIENT
Start: 2019-12-18 | End: 2020-03-03

## 2019-12-18 RX ORDER — CELECOXIB 100 MG/1
100 CAPSULE ORAL 2 TIMES DAILY
Qty: 60 CAP | Refills: 0 | Status: SHIPPED | OUTPATIENT
Start: 2019-12-18 | End: 2020-03-03

## 2019-12-18 NOTE — LETTER
"EMPLOYEE’S CLAIM FOR COMPENSATION/ REPORT OF INITIAL TREATMENT  FORM C-4    EMPLOYEE’S CLAIM - PROVIDE ALL INFORMATION REQUESTED   First Name  Alton Last Name  Naz Birthdate                    1945                Sex  female Claim Number   Home Address  [address] Age  74 y.o. 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 (if applicable)  lifting boxes     How did this injury or occupational disease occur? (Be specific an answer in detail. Use additional sheet if necessary)  lifting boxes    If you believe that you have an occupational disease, when did you first have knowledge of the disability and it relationship to your employment?   Witnesses to the Accident  abbie       Nature of Injury or Occupational Disease  Strain  Part(s) of Body Injured or Affected  Lower Back Area (Lumbar Area & Lumbo-Sacral), ,     I certify that the above is true and correct to the best of my knowledge and that I have provided this information in order to obtain the benefits of [place] " [place]  I hereby authorize any physician, chiropractor, surgeon, practitioner, or other person, any hospital, including [facility] or [facility] hospital, any medical service organization, any insurance company, or other institution or organization to release to each other, any medical or other information, including benefits paid or payable, pertinent to this injury or disease, except information relative to diagnosis, treatment and/or counseling for AIDS, psychological conditions, alcohol or controlled substances, for which I must give specific authorization.  A Photostat of this authorization shall be as valid as the original.     Date   Place   Employee’s Signature   THIS REPORT MUST BE COMPLETED AND MAILED WITHIN 3 WORKING DAYS OF TREATMENT   Place  [facility]  Name of Facility  [facility]   Date  12/18/2019 Diagnosis  (S39.012A) Low back strain, initial encounter Is there evidence the injured employee was under the influence of alcohol and/or another controlled substance at the time of accident?   Hour  10:06 AM Description of Injury or Disease  The encounter diagnosis was Low back strain, initial encounter. No   Treatment  Celebrex and baclofen sent directly to the pharmacy for patient.  Heat as directed.  Have you advised the patient to remain off work five days or more? No   X-Ray Findings      If Yes   From Date  To Date      From information given by the employee, together with medical evidence, can you directly connect this injury or occupational disease as job incurred?  Yes If No Full Duty No Modified Duty  Yes   Is additional medical care by a physician indicated?  Yes If Modified Duty, Specify any Limitations / Restrictions  See D 39 for restrictions   Do you know of any previous injury or disease contributing to this condition or occupational disease?                            No  Comments:Remote injury as a child though has not been problematic for  "greater than the last 45 years   Date  12/18/2019 Print Doctor’s Name Roverto Carolina M.D. I certify the employer’s copy of  this form was mailed on:   Address  [address] Insurer’s Use Only     [place] Zip  [address]    Provider’s Tax ID Number  [id number] Telephone  Dept: [phone]        e-ROVERTO Davis M.D.   e-Signature: Dr. Dano Swanson,   Medical Director Degree  MD        ORIGINAL-TREATING PHYSICIAN OR CHIROPRACTOR    PAGE 2-INSURER/TPA    PAGE 3-EMPLOYER    PAGE 4-EMPLOYEE             Form C-4 (rev.10/07)              BRIEF DESCRIPTION OF RIGHTS AND BENEFITS  (Pursuant to [place] 616[place])    Notice of Injury or Occupational Disease (Incident Report Form C-1): If an injury or occupational disease (OD) arises out of and in the course of employment, you must provide written notice to your employer as soon as practicable, but no later than 7 days after the accident or OD. Your employer shall maintain a sufficient supply of the required forms.    Claim for Compensation (Form C-4): If medical treatment is sought, the form C-4 is available at the place of initial treatment. A completed \"Claim for Compensation\" (Form C-4) must be filed within 90 days after an accident or OD. The treating physician or chiropractor must, within 3 working days after treatment, complete and mail to the employer, the employer's insurer and third-party , the Claim for Compensation.    Medical Treatment: If you require medical treatment for your on-the-job injury or OD, you may be required to select a physician or chiropractor from a list provided by your workers’ compensation insurer, if it has contracted with an Organization for Managed Care (MCO) or Preferred Provider Organization (PPO) or providers of health care. If your employer has not entered into a contract with an MCO or PPO, you may select a physician or chiropractor from the Panel of Physicians and Chiropractors. Any medical " costs related to your industrial injury or OD will be paid by your insurer.    Temporary Total Disability (TTD): If your doctor has certified that you are unable to work for a period of at least 5 consecutive days, or 5 cumulative days in a 20-day period, or places restrictions on you that your employer does not accommodate, you may be entitled to TTD compensation.    Temporary Partial Disability (TPD): If the wage you receive upon reemployment is less than the compensation for TTD to which you are entitled, the insurer may be required to pay you TPD compensation to make up the difference. TPD can only be paid for a maximum of 24 months.    Permanent Partial Disability (PPD): When your medical condition is stable and there is an indication of a PPD as a result of your injury or OD, within 30 days, your insurer must arrange for an evaluation by a rating physician or chiropractor to determine the degree of your PPD. The amount of your PPD award depends on the date of injury, the results of the PPD evaluation and your age and wage.    Permanent Total Disability (PTD): If you are medically certified by a treating physician or chiropractor as permanently and totally disabled and have been granted a PTD status by your insurer, you are entitled to receive monthly benefits not to exceed 66 2/3% of your average monthly wage. The amount of your PTD payments is subject to reduction if you previously received a PPD award.    Vocational Rehabilitation Services: You may be eligible for vocational rehabilitation services if you are unable to return to the job due to a permanent physical impairment or permanent restrictions as a result of your injury or occupational disease.    Transportation and Per Madalyn Reimbursement: You may be eligible for travel expenses and per madalyn associated with medical treatment.    Reopening: You may be able to reopen your claim if your condition worsens after claim closure.    Appeal Process: If you  disagree with a written determination issued by the insurer or the insurer does not respond to your request, you may appeal to the Department of Administration, , by following the instructions contained in your determination letter. You must appeal the determination within 70 days from the date of the determination letter at [address], [address], [place], [place] [address], or [address], [address], [place], [place] [address]. If you disagree with the  decision, you may appeal to the Department of Administration, . You must file your appeal within 30 days from the date of the  decision letter at [address], [address], [place], [place] [address], or [address], [address], [place], [place] [address]. If you disagree with a decision of an , you may file a petition for judicial review with the District Court. You must do so within 30 days of the Appeal Officer’s decision. You may be represented by an  at your own expense or you may contact the [facility] for possible representation.    [place]  for Injured Workers (NAIW): If you disagree with a  decision, you may request that NAIW represent you without charge at an  Hearing. For information regarding denial of benefits, you may contact the [facility] at: [address], [address], [place] [address], [phone], or [address], [address], [place] [address], [phone]    To File a Complaint with the Division: If you wish to file a complaint with the  of the Division of Industrial Relations (DIR),  please contact the Workers’ Compensation Section, [address], [place], [place] [address], telephone [phone], or [address], [place], [place] [address], telephone [phone].    For assistance with Workers’ Compensation Issues: You may  contact the Office of the Governor Consumer Health Assistance, [address] [place] [address], [address], [place], [place] [address], Toll Free 1-242.901.7584, Web site: http://govcha.[facility].nv.us, E-mail gayla@[email or web address].[email or web address].                   __________________________________________________________________                                                     _________        Employee Name / Signature                                                                                                                                              Date                                                                                                                                                                                                     D-2 (rev. 06/18)

## 2019-12-18 NOTE — PROGRESS NOTES
"Subjective:   Alton Childs  is a 74 y.o. female who presents for Back Injury (WC lower back after lifting boxes)    Date of injury 12/17/2019: Patient endorses lifting more boxes throughout the day than normal and noticing a pinch in her lower left back at approximately noon 12/17/2019.  Pain was progressive throughout the rest of the day.  Patient took ibuprofen after work with minimal effect.  Today patient endorses significant lower back pain bilaterally worse with walking.  No numbness, tingling, weakness.  Patient endorses remote unknown back injury when she was 18 without sequelae and has had no problems since.  Patient denies urinary or fecal incontinence.  No perianal numbness or tingling.   HPI  ROS  Allergies   Allergen Reactions   • Penicillins      'NASTY\"      Objective:   /80 (BP Location: Left arm, Patient Position: Sitting, BP Cuff Size: Adult)   Pulse 76   Temp 36.4 °C (97.5 °F) (Temporal)   Ht 1.626 m (5' 4\")   Wt 62.1 kg (137 lb)   LMP  (LMP Unknown)   SpO2 96%   BMI 23.52 kg/m²   Physical Exam  Constitutional:       General: She is not in acute distress.     Appearance: She is well-developed.   Eyes:      Pupils: Pupils are equal, round, and reactive to light.   Cardiovascular:      Rate and Rhythm: Normal rate and regular rhythm.      Heart sounds: Normal heart sounds.   Pulmonary:      Effort: Pulmonary effort is normal. No respiratory distress.      Breath sounds: Normal breath sounds.   Abdominal:      General: Bowel sounds are normal. There is no distension.      Palpations: Abdomen is soft.   Skin:     General: Skin is warm and dry.   Neurological:      Mental Status: She is alert and oriented to person, place, and time.      Deep Tendon Reflexes: Reflexes are normal and symmetric.   Psychiatric:         Behavior: Behavior normal.       Tenderness to palpation left paraspinal musculature in coccyx and lower lumbar spine areas.  Tenderness to palpation left piriformis " area.  Moderate muscle spasm noted left and right SI joint areas into lumbar paraspinal musculature.  DTRs intact bilaterally.  Antalgic gait noted.  Strength intact bilateral lower extremities.  Neurovascular intact lower bilateral extremities.   Assessment/Plan:   1. Low back strain, initial encounter  Baclofen and Celebrex sent direct pharmacy.  Differential diagnosis, natural history, supportive care, and indications for immediate follow-up discussed.

## 2019-12-18 NOTE — LETTER
[facility]  [address] [place] [address]  Phone:  [phone] - Fax:  [phone]   [facility] Progress Report and Disability Certification  Date of Service: 12/18/2019   No Show:  No  Date / Time of Next Visit: 12/27/2019   Claim Information   Patient Name: Alton Childs  Claim Number:     Employer:   [employer] Date of Injury: 12/17/2019     Insurer / TPA: Maryjane Paez  ID / SSN:     Occupation: lead manager   Diagnosis: The encounter diagnosis was Low back strain, initial encounter.    Medical Information   Related to Industrial Injury? Yes    Subjective Complaints:  Date of injury 12/17/2019: Patient endorses lifting more boxes throughout the day than normal and noticing a pinch in her lower left back at approximately noon 12/17/2019.  Pain was progressive throughout the rest of the day.  Patient took ibuprofen after work with minimal effect.  Today patient endorses significant lower back pain bilaterally worse with walking.  No numbness, tingling, weakness.  Patient endorses remote unknown back injury when she was 18 without sequelae and has had no problems since.  Patient denies urinary or fecal incontinence.  No perianal numbness or tingling.   Objective Findings: Tenderness to palpation left paraspinal musculature in coccyx and lower lumbar spine areas.  Tenderness to palpation left piriformis area.  Moderate muscle spasm noted left and right SI joint areas into lumbar paraspinal musculature.  DTRs intact bilaterally.  Antalgic gait noted.  Strength intact bilateral lower extremities.  Neurovascular intact lower bilateral extremities.   Pre-Existing Condition(s):     Assessment:   Initial Visit    Status: Additional Care Required  Permanent Disability:No    Plan: MedicationMedication (NOT at Work)    Diagnostics:      Comments:       Disability Information   Status: Released to Restricted Duty    From:  12/18/2019  Through:  2019 Restrictions are: Temporary   Physical Restrictions   Sitting:  < or = to 4 hrs/day Standing:  < or = to 2 hrs/day Stoopin hrs/day Bending:  < or = to 1 hr/day   Squattin hrs/day Walking:  < or = to 2 hrs/day Climbin hrs/day Pushing:  < or = to 1 hr/day   Pullin hrs/day Other:    Reaching Above Shoulder (L):   Reaching Above Shoulder (R):       Reaching Below Shoulder (L):    Reaching Below Shoulder (R):      Not to exceed Weight Limits   Carrying(hrs): 0 Weight Limit(lb): < or = to 10 pounds Lifting(hrs): 0 Weight  Limit(lb): < or = to 10 pounds   Comments:      Repetitive Actions   Hands: i.e. Fine Manipulations from Grasping:     Feet: i.e. Operating Foot Controls:     Driving / Operate Machinery:     Physician Name: Roverto Carolina M.D. Physician Signature: ROVERTO David M.D. e-Signature: Dr. Dano Swanson, Medical Director   Clinic Name / Location: [address] [address] Clinic Phone Number: Dept: [phone]   Appointment Time: 10:15 Am Visit Start Time: 10:06 AM   Check-In Time:  10:04 Am Visit Discharge Time:  10:17 am    Original-Treating Physician or Chiropractor    Page 2-Insurer/TPA    Page 3-Employer    Page 4-Employee

## 2019-12-19 DIAGNOSIS — I10 HTN (HYPERTENSION), BENIGN: Chronic | ICD-10-CM

## 2019-12-19 RX ORDER — HYDROCHLOROTHIAZIDE 25 MG/1
25 TABLET ORAL
Qty: 90 TAB | Refills: 1 | Status: SHIPPED | OUTPATIENT
Start: 2019-12-19 | End: 2020-06-02

## 2019-12-19 RX ORDER — LEVOTHYROXINE SODIUM 0.1 MG/1
TABLET ORAL
Qty: 90 TAB | Refills: 1 | Status: SHIPPED | OUTPATIENT
Start: 2019-12-19 | End: 2020-06-02

## 2019-12-19 NOTE — TELEPHONE ENCOUNTER
Was the patient seen in the last year in this department? Yes    Does patient have an active prescription for medications requested? Yes    Received Request Via: Pharmacy     Future Appointments       Provider Department Center    12/27/2019 10:30 AM [facility]

## 2019-12-27 ENCOUNTER — OCCUPATIONAL MEDICINE (OUTPATIENT)
Dept: URGENT CARE | Facility: CLINIC | Age: 74
End: 2019-12-27
Payer: COMMERCIAL

## 2019-12-27 VITALS
HEART RATE: 60 BPM | HEIGHT: 64 IN | DIASTOLIC BLOOD PRESSURE: 90 MMHG | TEMPERATURE: 98.1 F | WEIGHT: 137 LBS | SYSTOLIC BLOOD PRESSURE: 130 MMHG | RESPIRATION RATE: 17 BRPM | BODY MASS INDEX: 23.39 KG/M2 | OXYGEN SATURATION: 94 %

## 2019-12-27 DIAGNOSIS — S39.012D STRAIN OF LUMBAR REGION, SUBSEQUENT ENCOUNTER: ICD-10-CM

## 2019-12-27 PROCEDURE — 99214 OFFICE O/P EST MOD 30 MIN: CPT | Mod: 29 | Performed by: PHYSICIAN ASSISTANT

## 2019-12-27 ASSESSMENT — ENCOUNTER SYMPTOMS
SHORTNESS OF BREATH: 0
FOCAL WEAKNESS: 0
VOMITING: 0
CHILLS: 0
HEADACHES: 0
TREMORS: 0
DIZZINESS: 0
PALPITATIONS: 0
SPEECH CHANGE: 0
DIARRHEA: 0
FEVER: 0
SEIZURES: 0
ORTHOPNEA: 0
WEAKNESS: 0
BACK PAIN: 1
TINGLING: 0
SENSORY CHANGE: 0
LOSS OF CONSCIOUSNESS: 0
DOUBLE VISION: 0
BLURRED VISION: 0
COUGH: 0
ABDOMINAL PAIN: 0
NAUSEA: 0
CLAUDICATION: 0

## 2019-12-27 NOTE — PROGRESS NOTES
"Subjective:      Alton Childs is a 74 y.o. female who presents with Back Pain (wc f/u, feeling better)        HPI   Date of injury 12/17/2019: Patient endorses lifting more boxes throughout the day than normal and noticing a pinch in her lower left back at approximately noon 12/17/2019.  Pain was progressive throughout the rest of the day.  Patient took ibuprofen after work with minimal effect.  Today patient endorses significant improvement of the pain.  She states she is able to do her job full duty.     Review of Systems   Constitutional: Negative for chills and fever.   Eyes: Negative for blurred vision and double vision.   Respiratory: Negative for cough and shortness of breath.    Cardiovascular: Negative for chest pain, palpitations, orthopnea, claudication and leg swelling.   Gastrointestinal: Negative for abdominal pain, diarrhea, nausea and vomiting.   Musculoskeletal: Positive for back pain.   Skin: Negative for rash.   Neurological: Negative for dizziness, tingling, tremors, sensory change, speech change, focal weakness, seizures, loss of consciousness, weakness and headaches.   All other systems reviewed and are negative.    Pt PMH, SocHx, SurgHx, FamHx, Drug allergies and medications reviewed with pt/EPIC.  Family history reviewed, it is not pertinent to this complaint.       Objective:     Blood Pressure 130/90 (BP Location: Left arm, Patient Position: Sitting, BP Cuff Size: Adult)   Pulse 60   Temperature 36.7 °C (98.1 °F) (Temporal)   Respiration 17   Height 1.626 m (5' 4\")   Weight 62.1 kg (137 lb)   Last Menstrual Period  (LMP Unknown)   Oxygen Saturation 94%   Body Mass Index 23.52 kg/m²      Physical Exam    Constitutional: Pt is oriented to person, place, and time.  Appears well-developed and well-nourished. No distress.   HENT:   Mouth/Throat: Oropharynx is clear and moist.   Eyes: Conjunctivae are normal.   Cardiovascular: Normal rate.    Pulmonary/Chest: Effort normal. "   Musculoskeletal: No PTP of back  Neurological: Pt is alert and oriented to person, place, and time. Coordination normal.   Skin: Skin is warm. Pt is not diaphoretic. No erythema.   Psychiatric: Pt has a normal mood and affect.  Behavior is normal.          Assessment/Plan:       1. Strain of lumbar region, subsequent encounter  - D39 Discharged

## 2019-12-27 NOTE — LETTER
[facility]  [address]  [place] [address]  Phone:  [phone] - Fax:  [phone]   [facility] Progress Report and Disability Certification  Date of Service: 12/27/2019   No Show:  No  Date / Time of Next Visit:     Claim Information   Patient Name: Alton Childs  Claim Number:     Employer:    Date of Injury: 12/17/2019     Insurer / TPA: [employer] ID / SSN:     Occupation: lead manager   Diagnosis: The encounter diagnosis was Strain of lumbar region, subsequent encounter.    Medical Information   Related to Industrial Injury? Yes    Subjective Complaints:   Date of injury 12/17/2019: Patient endorses lifting more boxes throughout the day than normal and noticing a pinch in her lower left back at approximately noon 12/17/2019.  Pain was progressive throughout the rest of the day.  Patient took ibuprofen after work with minimal effect.  Today patient endorses significant improvement of the pain.  She states she is able to do her job full duty.   Objective Findings: Constitutional: Pt is oriented to person, place, and time.  Appears well-developed and well-nourished. No distress.   HENT:   Mouth/Throat: Oropharynx is clear and moist.   Eyes: Conjunctivae are normal.   Cardiovascular: Normal rate.    Pulmonary/Chest: Effort normal.   Musculoskeletal: No PTP of back  Neurological: Pt is alert and oriented to person, place, and time. Coordination normal.   Skin: Skin is warm. Pt is not diaphoretic. No erythema.   Psychiatric: Pt has a normal mood and affect.  Behavior is normal.      Pre-Existing Condition(s):     Assessment:   Condition Improved    Status: Discharged /  MMI  Permanent Disability:No    Plan:      Diagnostics:      Comments:       Disability Information   Status: Released to Full Duty    From:     Through:   Restrictions are:     Physical Restrictions   Sitting:    Standing:    Stooping:    Bending:      Squatting:   Walking:    Climbing:    Pushing:      Pulling:    Other:    Reaching Above Shoulder (L):   Reaching Above Shoulder (R):       Reaching Below Shoulder (L):    Reaching Below Shoulder (R):      Not to exceed Weight Limits   Carrying(hrs):   Weight Limit(lb):   Lifting(hrs):   Weight  Limit(lb):     Comments:      Repetitive Actions   Hands: i.e. Fine Manipulations from Grasping:     Feet: i.e. Operating Foot Controls:     Driving / Operate Machinery:     Physician Name: Kofi Quiroga P.A.-C. Physician Signature: KOFI Ramirez P.A.-C. e-Signature: Dr. Dano Swanson, Medical Director   Clinic Name / Location: [address] [address] Clinic Phone Number: Dept: [phone]   Appointment Time: 10:30 Am Visit Start Time: 10:31 AM   Check-In Time:  9:58 Am Visit Discharge Time: 10:57 AM   Original-Treating Physician or Chiropractor    Page 2-Insurer/TPA    Page 3-Employer    Page 4-Employee

## 2020-02-27 ENCOUNTER — NON-PROVIDER VISIT (OUTPATIENT)
Dept: MEDICAL GROUP | Facility: PHYSICIAN GROUP | Age: 75
End: 2020-02-27
Payer: MEDICARE

## 2020-02-27 ENCOUNTER — HOSPITAL ENCOUNTER (OUTPATIENT)
Dept: LAB | Facility: MEDICAL CENTER | Age: 75
End: 2020-02-27
Attending: PHYSICIAN ASSISTANT
Payer: MEDICARE

## 2020-02-27 DIAGNOSIS — Z01.84 IMMUNITY STATUS TESTING: ICD-10-CM

## 2020-02-27 DIAGNOSIS — Z11.59 ENCOUNTER FOR SCREENING FOR OTHER VIRAL DISEASES: ICD-10-CM

## 2020-02-27 DIAGNOSIS — R42 DIZZINESS: ICD-10-CM

## 2020-02-27 DIAGNOSIS — D72.829 LEUKOCYTOSIS, UNSPECIFIED TYPE: ICD-10-CM

## 2020-02-27 DIAGNOSIS — E03.9 ACQUIRED HYPOTHYROIDISM: ICD-10-CM

## 2020-02-27 DIAGNOSIS — R73.01 ELEVATED FASTING GLUCOSE: ICD-10-CM

## 2020-02-27 DIAGNOSIS — Z23 NEED FOR VACCINATION: ICD-10-CM

## 2020-02-27 LAB
BASOPHILS # BLD AUTO: 0.6 % (ref 0–1.8)
BASOPHILS # BLD: 0.05 K/UL (ref 0–0.12)
EOSINOPHIL # BLD AUTO: 0.07 K/UL (ref 0–0.51)
EOSINOPHIL NFR BLD: 0.8 % (ref 0–6.9)
ERYTHROCYTE [DISTWIDTH] IN BLOOD BY AUTOMATED COUNT: 45.8 FL (ref 35.9–50)
EST. AVERAGE GLUCOSE BLD GHB EST-MCNC: 120 MG/DL
HBA1C MFR BLD: 5.8 % (ref 0–5.6)
HBV SURFACE AB SERPL IA-ACNC: <3.1 MIU/ML (ref 0–10)
HCT VFR BLD AUTO: 45.5 % (ref 37–47)
HGB BLD-MCNC: 14.8 G/DL (ref 12–16)
IMM GRANULOCYTES # BLD AUTO: 0.03 K/UL (ref 0–0.11)
IMM GRANULOCYTES NFR BLD AUTO: 0.3 % (ref 0–0.9)
LYMPHOCYTES # BLD AUTO: 1.95 K/UL (ref 1–4.8)
LYMPHOCYTES NFR BLD: 22.6 % (ref 22–41)
MCH RBC QN AUTO: 31.7 PG (ref 27–33)
MCHC RBC AUTO-ENTMCNC: 32.5 G/DL (ref 33.6–35)
MCV RBC AUTO: 97.4 FL (ref 81.4–97.8)
MONOCYTES # BLD AUTO: 0.58 K/UL (ref 0–0.85)
MONOCYTES NFR BLD AUTO: 6.7 % (ref 0–13.4)
NEUTROPHILS # BLD AUTO: 5.94 K/UL (ref 2–7.15)
NEUTROPHILS NFR BLD: 69 % (ref 44–72)
NRBC # BLD AUTO: 0 K/UL
NRBC BLD-RTO: 0 /100 WBC
PLATELET # BLD AUTO: 332 K/UL (ref 164–446)
PMV BLD AUTO: 10.6 FL (ref 9–12.9)
RBC # BLD AUTO: 4.67 M/UL (ref 4.2–5.4)
TSH SERPL DL<=0.005 MIU/L-ACNC: 0.09 UIU/ML (ref 0.38–5.33)
WBC # BLD AUTO: 8.6 K/UL (ref 4.8–10.8)

## 2020-02-27 PROCEDURE — 86765 RUBEOLA ANTIBODY: CPT

## 2020-02-27 PROCEDURE — 86762 RUBELLA ANTIBODY: CPT

## 2020-02-27 PROCEDURE — 84443 ASSAY THYROID STIM HORMONE: CPT

## 2020-02-27 PROCEDURE — 83036 HEMOGLOBIN GLYCOSYLATED A1C: CPT

## 2020-02-27 PROCEDURE — 90471 IMMUNIZATION ADMIN: CPT | Performed by: PHYSICIAN ASSISTANT

## 2020-02-27 PROCEDURE — 36415 COLL VENOUS BLD VENIPUNCTURE: CPT

## 2020-02-27 PROCEDURE — 86735 MUMPS ANTIBODY: CPT

## 2020-02-27 PROCEDURE — 90715 TDAP VACCINE 7 YRS/> IM: CPT | Performed by: PHYSICIAN ASSISTANT

## 2020-02-27 PROCEDURE — 85025 COMPLETE CBC W/AUTO DIFF WBC: CPT

## 2020-02-27 PROCEDURE — 86706 HEP B SURFACE ANTIBODY: CPT

## 2020-02-27 PROCEDURE — 86787 VARICELLA-ZOSTER ANTIBODY: CPT

## 2020-02-27 NOTE — PROGRESS NOTES
"Alton Childs is a 74 y.o. female here for a non-provider visit for:   TDAP    Reason for immunization: needed for work/school  Immunization records indicate need for vaccine: Yes, confirmed with Epic  Minimum interval has been met for this vaccine: Yes  ABN completed: Yes    Order and dose verified by: Diana MERLOS was given to patient: Yes  All IAC Questionnaire questions were answered \"No.\"    Patient tolerated injection and no adverse effects were observed or reported: Yes    Pt scheduled for next dose in series: No    Titers ordered for other vaccines needed for patients school    "

## 2020-02-28 ENCOUNTER — APPOINTMENT (OUTPATIENT)
Dept: MEDICAL GROUP | Facility: PHYSICIAN GROUP | Age: 75
End: 2020-02-28
Payer: MEDICARE

## 2020-02-28 LAB — VZV IGG SER IA-ACNC: 2

## 2020-03-03 ENCOUNTER — OFFICE VISIT (OUTPATIENT)
Dept: MEDICAL GROUP | Facility: PHYSICIAN GROUP | Age: 75
End: 2020-03-03
Payer: MEDICARE

## 2020-03-03 ENCOUNTER — NON-PROVIDER VISIT (OUTPATIENT)
Dept: MEDICAL GROUP | Facility: PHYSICIAN GROUP | Age: 75
End: 2020-03-03
Payer: MEDICARE

## 2020-03-03 VITALS
BODY MASS INDEX: 27.72 KG/M2 | OXYGEN SATURATION: 96 % | DIASTOLIC BLOOD PRESSURE: 74 MMHG | TEMPERATURE: 97.7 F | WEIGHT: 162.4 LBS | HEART RATE: 62 BPM | HEIGHT: 64 IN | SYSTOLIC BLOOD PRESSURE: 118 MMHG | RESPIRATION RATE: 16 BRPM

## 2020-03-03 DIAGNOSIS — Z12.31 ENCOUNTER FOR SCREENING MAMMOGRAM FOR BREAST CANCER: ICD-10-CM

## 2020-03-03 DIAGNOSIS — E03.9 ACQUIRED HYPOTHYROIDISM: ICD-10-CM

## 2020-03-03 DIAGNOSIS — Z12.11 SCREENING FOR COLON CANCER: ICD-10-CM

## 2020-03-03 DIAGNOSIS — I10 HTN (HYPERTENSION), BENIGN: Chronic | ICD-10-CM

## 2020-03-03 DIAGNOSIS — R73.03 PREDIABETES: ICD-10-CM

## 2020-03-03 DIAGNOSIS — Z23 NEED FOR VACCINATION: ICD-10-CM

## 2020-03-03 PROCEDURE — 90746 HEPB VACCINE 3 DOSE ADULT IM: CPT | Performed by: PHYSICIAN ASSISTANT

## 2020-03-03 PROCEDURE — 90472 IMMUNIZATION ADMIN EACH ADD: CPT | Performed by: PHYSICIAN ASSISTANT

## 2020-03-03 PROCEDURE — 99214 OFFICE O/P EST MOD 30 MIN: CPT | Performed by: PHYSICIAN ASSISTANT

## 2020-03-03 PROCEDURE — G0010 ADMIN HEPATITIS B VACCINE: HCPCS | Performed by: PHYSICIAN ASSISTANT

## 2020-03-03 PROCEDURE — 90707 MMR VACCINE SC: CPT | Performed by: PHYSICIAN ASSISTANT

## 2020-03-03 ASSESSMENT — PATIENT HEALTH QUESTIONNAIRE - PHQ9: CLINICAL INTERPRETATION OF PHQ2 SCORE: 0

## 2020-03-03 ASSESSMENT — FIBROSIS 4 INDEX: FIB4 SCORE: 1.51

## 2020-03-04 ENCOUNTER — HOSPITAL ENCOUNTER (OUTPATIENT)
Dept: RADIOLOGY | Facility: MEDICAL CENTER | Age: 75
End: 2020-03-04
Attending: PHYSICIAN ASSISTANT
Payer: MEDICARE

## 2020-03-04 DIAGNOSIS — Z12.31 ENCOUNTER FOR SCREENING MAMMOGRAM FOR BREAST CANCER: ICD-10-CM

## 2020-03-04 LAB
MEV IGG SER IA-ACNC: 3.33
MUV IGG SER IA-ACNC: 1.71
RUBV AB SER QL: <0.2 IU/ML

## 2020-03-04 PROCEDURE — 77067 SCR MAMMO BI INCL CAD: CPT | Mod: 50

## 2020-03-10 NOTE — PROGRESS NOTES
Chief Complaint   Patient presents with   • Lab Results     Go over Labs        HISTORY OF PRESENT ILLNESS: Alton Childs is an established 74 y.o. female here to discuss the evaluation and management of:    Prediabetes  Discussed lab work results from 2/27/2020 with patient.  Discussed with patient lab work results indicate that she has prediabetes.  Hemoglobin A1c 5.8 at that time.  She tells me her diet is healthy and she is a regular exercise routine.  States she walks 5 days a week 35 minutes per time.  States he feels her diet is well-balanced.  She admits that 2 months ago she did the ketogenic diet and soon after gained 25 LBS.  Patient referred to nutritional services for further evaluation.  Patient agreed to plan.    Acquired hypothyroidism  Chronic problem.  Unstable.  Lab work from 2/27/2020 indicated TSH level of 0.090.  Patient states she has been feeling cold and experiencing lack of energy.  She tells me that she takes Synthroid 100 MCG tab every morning with her blood pressure medications.  She denies side effects or complications of medication.  Patient is agreed to repeat lab work.    HTN (hypertension), benign  Chronic stable problem.  Patient takes losartan 100 mg tab once daily and hydrochlorothiazide 25 mg tab once daily.  She tells me she is compliant with medications and experiences no side effects or complications of medications.  Denies chest pain, shortness breath, heart palpitations, dizziness, syncope, severe headache, vision changes, peripheral edema.  Patient's blood pressure during today's appointment 118/74mmHg.        Patient Active Problem List    Diagnosis Date Noted   • Environmental and seasonal allergies 04/01/2019   • Family history of breast cancer 04/21/2016   • Tinnitus 06/09/2015   • Acquired hypothyroidism 06/02/2015   • Dyslipidemia 10/25/2012   • DJD (degenerative joint disease) of cervical spine 07/18/2012   • HTN (hypertension), benign 04/22/2011        Allergies:Penicillins    Current Outpatient Medications   Medication Sig Dispense Refill   • hydroCHLOROthiazide (HYDRODIURIL) 25 MG Tab Take 1 Tab by mouth every day. 90 Tab 1   • levothyroxine (SYNTHROID) 100 MCG Tab TAKE ONE TABLET BY MOUTH EVERY MORNING ON AN EMPTY STOMACH 90 Tab 1   • losartan (COZAAR) 100 MG Tab Take 1 Tab by mouth every day. 100 Tab 3     No current facility-administered medications for this visit.        Social History     Tobacco Use   • Smoking status: Former Smoker     Packs/day: 0.75     Years: 32.00     Pack years: 24.00     Types: Cigarettes     Last attempt to quit: 1993     Years since quittin.6   • Smokeless tobacco: Never Used   • Tobacco comment: started at 16   Substance Use Topics   • Alcohol use: Yes     Alcohol/week: 0.0 oz     Comment: once a year   • Drug use: No       Family Status   Relation Name Status   • Fa   at age 40   • Mo  Alive        94 in 2016   • PGMo  (Not Specified)     Family History   Problem Relation Age of Onset   • Hypertension Father    • Heart Disease Father    • Cancer Mother         breast   • Genetic Disorder Paternal Grandmother        ROS:  Review of Systems   Constitutional: Negative for fever, chills, weight loss.  Positive for fatigue.  Positive for weight gain.  HENT: Negative for ear pain, nosebleeds, congestion, sore throat and neck pain.    Eyes: Negative for blurred vision.   Respiratory: Negative for cough, sputum production, shortness of breath and wheezing.    Cardiovascular: Negative for chest pain, palpitations, orthopnea and leg swelling.   Gastrointestinal: Negative for heartburn, nausea, vomiting and abdominal pain.   Genitourinary: Negative for dysuria, urgency and frequency.   Musculoskeletal: Negative for myalgias, back pain and joint pain.   Skin: Negative for rash and itching.   Neurological: Negative for dizziness, tingling, tremors, sensory change, focal weakness and headaches.   Endo/Heme/Allergies:  "Does not bruise/bleed easily.   Psychiatric/Behavioral: Negative for depression, suicidal ideas and memory loss.  The patient is not nervous/anxious and does not have insomnia.    All other systems reviewed and are negative except as in HPI.    Exam: /74 (BP Location: Left arm, Patient Position: Sitting, BP Cuff Size: Adult)   Pulse 62   Temp 36.5 °C (97.7 °F) (Temporal)   Resp 16   Ht 1.626 m (5' 4\")   Wt 73.7 kg (162 lb 6.4 oz)   SpO2 96%  Body mass index is 27.88 kg/m².  General: Normal appearing. No distress.  HEENT: Normocephalic. Eyes conjunctiva clear lids without ptosis, ears normal shape and contour.  Neck: Supple without JVD. Thyroid is not enlarged.  Pulmonary: Clear to ausculation.  Normal effort. No rales, ronchi, or wheezing.  Cardiovascular: Regular rate and rhythm without murmur.   Abdomen: Soft, nontender, nondistended.   Neurologic: Grossly nonfocal.  Cranial nerves are normal.   \Skin: Warm and dry.  No rashes or suspicious skin lesions.  Musculoskeletal: Normal gait. No extremity cyanosis, clubbing, or edema.  Psych: Normal mood and affect. Alert and oriented x3. Judgment and insight is normal.    Medical decision-making and discussion:  1. Prediabetes  Discussed with patient recent lab work results indicate she has prediabetes.  Patient tells me that her diet is healthy and she is a regular exercise routine.  Patient would like to follow-up with nutrition services for further evaluation.  Referral will be placed.  Highly emphasized importance of healthy diet regular exercise routine with patient.  Patient will follow-up in 6 months for reevaluation.    - REFERRAL TO NUTRITION SERVICES    2. Acquired hypothyroidism  Chronic problem.  Unstable.  Recent lab work results from 2/27/20 indicated a low TSH level.  Patient admits that she takes Synthroid with her blood pressure medications every morning.  Advised patient to not take Synthroid with other medications.    Instructed patient to " take medication every morning on empty stomach with glass of water, 30 minutes prior to food or other medications. Labs as indicated.  Patient repeat lab work in 3-4 weeks for reevaluation.  Will be contacted with results.  If results are abnormal follow-up appointment will be made.    - TSH WITH REFLEX TO FT4; Future    3. HTN (hypertension), benign  Well-controlled. Labs as indicated. Continue antihypertensive medications. Discussed decreasing salt intake. Emphasized benefits of exercise and diet. Continue to monitor.      4. Encounter for screening mammogram for breast cancer    - MA-SCREENING MAMMO BILAT W/TOMOSYNTHESIS W/CAD; Future    5. Screening for colon cancer    - COLOGUARD (FIT DNA)      Please note that this dictation was created using voice recognition software. I have made every reasonable attempt to correct obvious errors, but I expect that there are errors of grammar and possibly content that I did not discover before finalizing the note.    Assessment/Plan:  1. Prediabetes  REFERRAL TO NUTRITION SERVICES   2. Acquired hypothyroidism  TSH WITH REFLEX TO FT4   3. Encounter for screening mammogram for breast cancer  MA-SCREENING MAMMO BILAT W/TOMOSYNTHESIS W/CAD    CANCELED: MA-SCREENING MAMMO BILAT W/CAD   4. Screening for colon cancer  COLOGUARD (FIT DNA)       No follow-ups on file.

## 2020-04-08 ENCOUNTER — PATIENT OUTREACH (OUTPATIENT)
Dept: HEALTH INFORMATION MANAGEMENT | Facility: OTHER | Age: 75
End: 2020-04-08

## 2020-04-08 NOTE — PROGRESS NOTES
Outcome: Left Message- SCP intro needed     Please transfer to Patient Outreach Team at [phone] when patient returns call.    [facility] Verified: yes    Attempt # 1

## 2020-06-02 DIAGNOSIS — I10 HTN (HYPERTENSION), BENIGN: Chronic | ICD-10-CM

## 2020-06-02 RX ORDER — LEVOTHYROXINE SODIUM 0.1 MG/1
TABLET ORAL
Qty: 30 TAB | Refills: 0 | Status: SHIPPED | OUTPATIENT
Start: 2020-06-02 | End: 2020-07-07 | Stop reason: SDUPTHER

## 2020-06-02 RX ORDER — HYDROCHLOROTHIAZIDE 25 MG/1
TABLET ORAL
Qty: 90 TAB | Refills: 1 | Status: SHIPPED | OUTPATIENT
Start: 2020-06-02 | End: 2020-10-07

## 2020-06-10 RX ORDER — LOSARTAN POTASSIUM 100 MG/1
100 TABLET ORAL DAILY
Qty: 100 TAB | Refills: 0 | Status: SHIPPED | OUTPATIENT
Start: 2020-06-10 | End: 2020-09-08

## 2020-07-03 ENCOUNTER — HOSPITAL ENCOUNTER (OUTPATIENT)
Dept: LAB | Facility: MEDICAL CENTER | Age: 75
End: 2020-07-03
Attending: PHYSICIAN ASSISTANT
Payer: MEDICARE

## 2020-07-03 DIAGNOSIS — E03.9 ACQUIRED HYPOTHYROIDISM: ICD-10-CM

## 2020-07-03 LAB — TSH SERPL DL<=0.005 MIU/L-ACNC: 0.53 UIU/ML (ref 0.38–5.33)

## 2020-07-03 PROCEDURE — 36415 COLL VENOUS BLD VENIPUNCTURE: CPT

## 2020-07-03 PROCEDURE — 84443 ASSAY THYROID STIM HORMONE: CPT

## 2020-07-07 RX ORDER — LEVOTHYROXINE SODIUM 0.1 MG/1
TABLET ORAL
Qty: 100 TAB | Refills: 1 | Status: SHIPPED | OUTPATIENT
Start: 2020-07-07 | End: 2020-10-20 | Stop reason: SDUPTHER

## 2020-07-07 NOTE — TELEPHONE ENCOUNTER
Received request via: Patient    Was the patient seen in the last year in this department? Yes    Does the patient have an active prescription (recently filled or refills available) for medication(s) requested? No       Patient requested 100 Tabs

## 2020-07-08 RX ORDER — LEVOTHYROXINE SODIUM 0.1 MG/1
TABLET ORAL
Qty: 30 TAB | Refills: 0 | OUTPATIENT
Start: 2020-07-08

## 2020-07-22 NOTE — PROGRESS NOTES
1. [facility] Verified: yes    2. Verify PCP: yes    3. Review and add  to Care Team: yes    4. Reviewed/Updated the following with patient:       •   Communication Preference Obtained? YES  • MyChart Activation: already active       •   E-Mail Address Obtained? YES       •   Appointment Day and Time Preferences? YES       •   Preferred Pharmacy? YES       •   Preferred Lab? YES    5. Care Gap Scheduling (Attempt to Schedule EACH Overdue Care Gap!)    Patient prefers to discuss Annual Wellness Visit (AWV) and Dexa Scan with PCP.

## 2020-09-01 ENCOUNTER — OFFICE VISIT (OUTPATIENT)
Dept: MEDICAL GROUP | Facility: PHYSICIAN GROUP | Age: 75
End: 2020-09-01
Payer: MEDICARE

## 2020-09-01 VITALS
HEIGHT: 64 IN | DIASTOLIC BLOOD PRESSURE: 72 MMHG | HEART RATE: 64 BPM | WEIGHT: 164.2 LBS | OXYGEN SATURATION: 93 % | SYSTOLIC BLOOD PRESSURE: 130 MMHG | TEMPERATURE: 98 F | RESPIRATION RATE: 16 BRPM | BODY MASS INDEX: 28.03 KG/M2

## 2020-09-01 DIAGNOSIS — E03.9 ACQUIRED HYPOTHYROIDISM: ICD-10-CM

## 2020-09-01 DIAGNOSIS — D64.9 ANEMIA, UNSPECIFIED TYPE: ICD-10-CM

## 2020-09-01 DIAGNOSIS — Z02.9 ADMINISTRATIVE ENCOUNTER: ICD-10-CM

## 2020-09-01 DIAGNOSIS — R73.03 PREDIABETES: ICD-10-CM

## 2020-09-01 PROCEDURE — 99214 OFFICE O/P EST MOD 30 MIN: CPT | Performed by: PHYSICIAN ASSISTANT

## 2020-09-01 ASSESSMENT — FIBROSIS 4 INDEX: FIB4 SCORE: 1.53

## 2020-09-01 NOTE — PROGRESS NOTES
Chief Complaint   Patient presents with   • Paperwork     dmv        HISTORY OF PRESENT ILLNESS: Alton Childs is an established 75 y.o. female here to discuss the evaluation and management of:    Acquired hypothyroidism  Chronic but stable problem.  Patient is currently taking Synthroid 100 MCG tab once daily.  States she is compliant with medication and experiences no side effects or complications of medication.  She admits that she has been feeling fatigued and has gained weight.  She tells me she did not exercise for 2 months but recently started walking every day 45 minutes per time.  She denies getting her heart rate up or breathing heavily/sweating during exercising.    Prediabetes  Chronic but stable problem.  Lab work is up-to-date.  Patient tells me that she recently started a new fiber diet and is curious if this will get her out of the prediabetic range.  States she is also exercising again.    Anemia, unspecified type  Recent lab work indicated possible B12 deficiency.  Lab work has been ordered.  Patient is complaining of fatigue.    Administrative encounter  Patient has brought in DMV paperwork to renew license.  Patient's optometrist has signed paperwork.  Patient is requesting I complete hyper work.        Patient Active Problem List    Diagnosis Date Noted   • Environmental and seasonal allergies 04/01/2019   • Family history of breast cancer 04/21/2016   • Tinnitus 06/09/2015   • Acquired hypothyroidism 06/02/2015   • Dyslipidemia 10/25/2012   • DJD (degenerative joint disease) of cervical spine 07/18/2012   • HTN (hypertension), benign 04/22/2011       Allergies:Penicillins    Current Outpatient Medications   Medication Sig Dispense Refill   • levothyroxine (SYNTHROID) 100 MCG Tab TAKE 1 TABLET BY MOUTH EVERY MORNING ON AN EMPTY STOMACH 100 Tab 1   • losartan (COZAAR) 100 MG Tab Take 1 Tab by mouth every day. 100 Tab 0   • hydroCHLOROthiazide (HYDRODIURIL) 25 MG Tab TAKE 1 TABLET BY  MOUTH EVERY DAY 90 Tab 1     No current facility-administered medications for this visit.        Social History     Tobacco Use   • Smoking status: Former Smoker     Packs/day: 0.75     Years: 32.00     Pack years: 24.00     Types: Cigarettes     Quit date: 1993     Years since quittin.1   • Smokeless tobacco: Never Used   • Tobacco comment: started at 16   Substance Use Topics   • Alcohol use: Yes     Alcohol/week: 0.0 oz     Comment: once a year   • Drug use: No       Family Status   Relation Name Status   • Fa   at age 40   • Mo  Alive        94 in 2016   • PGMo  (Not Specified)     Family History   Problem Relation Age of Onset   • Hypertension Father    • Heart Disease Father    • Cancer Mother         breast   • Genetic Disorder Paternal Grandmother        ROS:  Review of Systems   Constitutional: Negative for fever, chills, weight loss. + for malaise/fatigue.   HENT: Negative for ear pain, nosebleeds, congestion, sore throat and neck pain.    Eyes: Negative for blurred vision.   Respiratory: Negative for cough, sputum production, shortness of breath and wheezing.    Cardiovascular: Negative for chest pain, palpitations, orthopnea and leg swelling.   Gastrointestinal: Negative for heartburn, nausea, vomiting and abdominal pain.   Genitourinary: Negative for dysuria, urgency and frequency.   Musculoskeletal: Negative for myalgias, back pain and joint pain.   Skin: Negative for rash and itching.   Neurological: Negative for dizziness, tingling, tremors, sensory change, focal weakness and headaches.   Endo/Heme/Allergies: Does not bruise/bleed easily.   Psychiatric/Behavioral: Negative for depression, suicidal ideas and memory loss.  The patient is not nervous/anxious and does not have insomnia.    All other systems reviewed and are negative except as in HPI.    Exam: /72 (BP Location: Left arm, Patient Position: Sitting)   Pulse 64   Temp 36.7 °C (98 °F) (Temporal)   Resp 16   Ht  "1.626 m (5' 4\")   Wt 74.5 kg (164 lb 3.2 oz)   SpO2 93%  Body mass index is 28.18 kg/m².  General: Normal appearing. No distress.  HEENT: Normocephalic. Eyes conjunctiva clear lids without ptosis,ears normal shape and contour.  Neck: Supple without JVD or bruit. Thyroid is not enlarged.  Pulmonary: Clear to ausculation.  Normal effort. No rales, ronchi, or wheezing.  Cardiovascular: Regular rate and rhythm without murmur.  Abdomen: Soft, nontender, nondistended.   Neurologic: Grossly nonfocal.  Cranial nerves are normal.   Skin: Warm and dry.  No rashes or suspicious skin lesions.  Musculoskeletal: Normal gait. No extremity cyanosis, clubbing, or edema.  Psych: Normal mood and affect. Alert and oriented x3. Judgment and insight is normal.    Medical decision-making and discussion:  1. Acquired hypothyroidism  Continue thyroid medication. Instructed patient to take on empty stomach with glass of water, 30 minutes prior to food or other medications. Labs as indicated.  Repeat lab work in 4 months.  Patient will follow-up in 4 months for evaluation.      - TSH WITH REFLEX TO FT4; Future    2. Prediabetes  Chronic but stable problem.  Patient repeat lab work in 4 months reevaluation.  Stressed importance of healthy diet regular exercise routine.  Advised patient when exercising make sure that she is getting her heart rate up, breathing heavy, and breaking a sweat.  Patient good plan.    - HEMOGLOBIN A1C; Future    3. Anemia, unspecified type  Lab work has been ordered.  We will discuss lab work results during follow-up appointment.    - VITAMIN B12; Future    4. Administrative encounter  DMV paperwork has been completed.  Scanned to patient's chart.  Patient was provided hardcopy.      Please note that this dictation was created using voice recognition software. I have made every reasonable attempt to correct obvious errors, but I expect that there are errors of grammar and possibly content that I did not discover " before finalizing the note.    Assessment/Plan:  1. Acquired hypothyroidism  TSH WITH REFLEX TO FT4   2. Prediabetes  HEMOGLOBIN A1C   3. Anemia, unspecified type  VITAMIN B12   4. Administrative encounter         Return in about 4 months (around 1/1/2021), or if symptoms worsen or fail to improve.

## 2020-09-08 RX ORDER — LOSARTAN POTASSIUM 100 MG/1
TABLET ORAL
Qty: 100 TAB | Refills: 1 | Status: SHIPPED | OUTPATIENT
Start: 2020-09-08 | End: 2020-10-20 | Stop reason: SDUPTHER

## 2020-10-07 DIAGNOSIS — I10 HTN (HYPERTENSION), BENIGN: Chronic | ICD-10-CM

## 2020-10-07 RX ORDER — HYDROCHLOROTHIAZIDE 25 MG/1
TABLET ORAL
Qty: 90 TAB | Refills: 1 | Status: SHIPPED | OUTPATIENT
Start: 2020-10-07 | End: 2020-10-20 | Stop reason: SDUPTHER

## 2020-10-20 DIAGNOSIS — I10 HTN (HYPERTENSION), BENIGN: Chronic | ICD-10-CM

## 2020-10-20 RX ORDER — LEVOTHYROXINE SODIUM 0.1 MG/1
TABLET ORAL
Qty: 90 TAB | Refills: 0 | Status: SHIPPED | OUTPATIENT
Start: 2020-10-20 | End: 2021-01-12

## 2020-10-20 RX ORDER — LOSARTAN POTASSIUM 100 MG/1
100 TABLET ORAL
Qty: 30 TAB | Refills: 0 | Status: SHIPPED | OUTPATIENT
Start: 2020-10-20 | End: 2020-11-12

## 2020-10-20 RX ORDER — HYDROCHLOROTHIAZIDE 25 MG/1
25 TABLET ORAL
Qty: 30 TAB | Refills: 0 | Status: SHIPPED | OUTPATIENT
Start: 2020-10-20 | End: 2020-11-12

## 2020-10-20 NOTE — TELEPHONE ENCOUNTER
VOIC[employer]AIL  1. Caller Name: Alton Childs                      Call Back Number: [phone]    2. Message: Pt called asking to please refill these meds, as she recently moved and the moving van with her items are lost in transit somewhere in [place].

## 2020-10-20 NOTE — TELEPHONE ENCOUNTER
Received request via: Patient    Was the patient seen in the last year in this department? Yes    Does the patient have an active prescription (recently filled or refills available) for medication(s) requested? No     Pt traveling left meds on moving truck, and just need resent to this pharmacy.

## 2020-11-12 DIAGNOSIS — I10 HTN (HYPERTENSION), BENIGN: Chronic | ICD-10-CM

## 2020-11-12 RX ORDER — HYDROCHLOROTHIAZIDE 25 MG/1
TABLET ORAL
Qty: 30 TAB | Refills: 0 | Status: SHIPPED | OUTPATIENT
Start: 2020-11-12 | End: 2022-03-09

## 2020-11-12 RX ORDER — LOSARTAN POTASSIUM 100 MG/1
TABLET ORAL
Qty: 30 TAB | Refills: 0 | Status: SHIPPED | OUTPATIENT
Start: 2020-11-12 | End: 2022-03-10 | Stop reason: SDUPTHER

## 2021-01-11 DIAGNOSIS — Z23 NEED FOR VACCINATION: ICD-10-CM

## 2022-02-28 ENCOUNTER — TELEPHONE (OUTPATIENT)
Dept: SCHEDULING | Facility: IMAGING CENTER | Age: 77
End: 2022-02-28
Payer: COMMERCIAL

## 2022-03-02 ENCOUNTER — HOSPITAL ENCOUNTER (EMERGENCY)
Facility: MEDICAL CENTER | Age: 77
End: 2022-03-02
Attending: EMERGENCY MEDICINE
Payer: MEDICARE

## 2022-03-02 VITALS
DIASTOLIC BLOOD PRESSURE: 97 MMHG | RESPIRATION RATE: 14 BRPM | WEIGHT: 176.37 LBS | OXYGEN SATURATION: 93 % | SYSTOLIC BLOOD PRESSURE: 183 MMHG | HEIGHT: 64 IN | BODY MASS INDEX: 30.11 KG/M2 | TEMPERATURE: 98.3 F | HEART RATE: 56 BPM

## 2022-03-02 DIAGNOSIS — I15.9 SECONDARY HYPERTENSION: ICD-10-CM

## 2022-03-02 DIAGNOSIS — E86.0 DEHYDRATION: ICD-10-CM

## 2022-03-02 LAB
ANION GAP SERPL CALC-SCNC: 12 MMOL/L (ref 7–16)
BASOPHILS # BLD AUTO: 0.3 % (ref 0–1.8)
BASOPHILS # BLD: 0.04 K/UL (ref 0–0.12)
BUN SERPL-MCNC: 25 MG/DL (ref 8–22)
CALCIUM SERPL-MCNC: 9.8 MG/DL (ref 8.4–10.2)
CHLORIDE SERPL-SCNC: 99 MMOL/L (ref 96–112)
CO2 SERPL-SCNC: 25 MMOL/L (ref 20–33)
CREAT SERPL-MCNC: 0.83 MG/DL (ref 0.5–1.4)
EKG IMPRESSION: NORMAL
EOSINOPHIL # BLD AUTO: 0.04 K/UL (ref 0–0.51)
EOSINOPHIL NFR BLD: 0.3 % (ref 0–6.9)
ERYTHROCYTE [DISTWIDTH] IN BLOOD BY AUTOMATED COUNT: 41.6 FL (ref 35.9–50)
GLUCOSE SERPL-MCNC: 108 MG/DL (ref 65–99)
HCT VFR BLD AUTO: 44 % (ref 37–47)
HGB BLD-MCNC: 14.8 G/DL (ref 12–16)
IMM GRANULOCYTES # BLD AUTO: 0.09 K/UL (ref 0–0.11)
IMM GRANULOCYTES NFR BLD AUTO: 0.6 % (ref 0–0.9)
LYMPHOCYTES # BLD AUTO: 1.83 K/UL (ref 1–4.8)
LYMPHOCYTES NFR BLD: 12.3 % (ref 22–41)
MCH RBC QN AUTO: 31.7 PG (ref 27–33)
MCHC RBC AUTO-ENTMCNC: 33.6 G/DL (ref 33.6–35)
MCV RBC AUTO: 94.2 FL (ref 81.4–97.8)
MONOCYTES # BLD AUTO: 0.79 K/UL (ref 0–0.85)
MONOCYTES NFR BLD AUTO: 5.3 % (ref 0–13.4)
NEUTROPHILS # BLD AUTO: 12.08 K/UL (ref 2–7.15)
NEUTROPHILS NFR BLD: 81.2 % (ref 44–72)
NRBC # BLD AUTO: 0 K/UL
NRBC BLD-RTO: 0 /100 WBC
PLATELET # BLD AUTO: 306 K/UL (ref 164–446)
PMV BLD AUTO: 9.5 FL (ref 9–12.9)
POTASSIUM SERPL-SCNC: 3.8 MMOL/L (ref 3.6–5.5)
RBC # BLD AUTO: 4.67 M/UL (ref 4.2–5.4)
SODIUM SERPL-SCNC: 136 MMOL/L (ref 135–145)
WBC # BLD AUTO: 14.9 K/UL (ref 4.8–10.8)

## 2022-03-02 PROCEDURE — 93005 ELECTROCARDIOGRAM TRACING: CPT | Performed by: EMERGENCY MEDICINE

## 2022-03-02 PROCEDURE — 85025 COMPLETE CBC W/AUTO DIFF WBC: CPT

## 2022-03-02 PROCEDURE — 80048 BASIC METABOLIC PNL TOTAL CA: CPT

## 2022-03-02 PROCEDURE — 36415 COLL VENOUS BLD VENIPUNCTURE: CPT

## 2022-03-02 PROCEDURE — 99283 EMERGENCY DEPT VISIT LOW MDM: CPT

## 2022-03-03 ENCOUNTER — TELEPHONE (OUTPATIENT)
Dept: MEDICAL GROUP | Facility: PHYSICIAN GROUP | Age: 77
End: 2022-03-03
Payer: MEDICARE

## 2022-03-03 NOTE — ED TRIAGE NOTES
"Pt presents with friend from home for vertigo that began while she was sitting and working on computer. Pt became states she leaned back in her chair and became very dizzy like \"the room was moving.\" It is slowly improving but not totally resolved. BP was high at home and currently 183/91. Pt A&Ox4 and ambulatory with stand by assist.     Has this patient been vaccinated for COVID yes    "

## 2022-03-03 NOTE — ED NOTES
Pt provided with discharge paper work and follow up care instructions. Pt ambulated out of ER without issues.

## 2022-03-03 NOTE — ED PROVIDER NOTES
"ER Provider Note     Scribed for Oumar Grey M.D. by Jose Sandra. 3/2/2022, 7:24 PM.    Primary Care Provider: Mercedes Rivas P.A.-C.  Means of Arrival: Walk-In   History obtained from: Patient  History limited by: None     CHIEF COMPLAINT  Chief Complaint   Patient presents with    Vertigo     HPI  Alton Childs is a 76 y.o. female who presents to the Emergency Department for acute vertigo. Patient reports this began at 5 PM. She reports a history of dizzy spells but reports this episode is different. She denies feeling near syncopal. Patient reports she feels \"unbalanced\". Due to this the patient was prompted to present to the ED. She reports no other associated symptoms. There are no alleviating or exacerbating factors reported at this time. She denies associated nausea, vomiting, syncope, fever, chills, or near syncopal sensation. Patient reports a history of a prolapsed bowel. She reports currently being vaccinated for COVID.    REVIEW OF SYSTEMS  See HPI for further details. All other systems are negative.     PAST MEDICAL HISTORY   has a past medical history of Arthritis, Heart valve disease, HTN (hypertension), benign (2011), and Hypothyroid (2011).    SURGICAL HISTORY   has a past surgical history that includes cholecystectomy; inj,epidural,cerv/thor single (2012); gyn surgery; other orthopedic surgery; other; cervical fusion posterior (2012); cervical decompression posterior (2012); and cervical foraminotomy (2012).    SOCIAL HISTORY  Social History     Tobacco Use    Smoking status: Former Smoker     Packs/day: 0.75     Years: 32.00     Pack years: 24.00     Types: Cigarettes     Quit date: 1993     Years since quittin.6    Smokeless tobacco: Never Used    Tobacco comment: started at 16   Vaping Use    Vaping Use: Never used   Substance Use Topics    Alcohol use: Yes     Alcohol/week: 0.0 oz     Comment: once a year    Drug use: No      Social History " "    Substance and Sexual Activity   Drug Use No       FAMILY HISTORY  Family History   Problem Relation Age of Onset    Hypertension Father     Heart Disease Father     Cancer Mother         breast    Genetic Disorder Paternal Grandmother        CURRENT MEDICATIONS  Home Medications       Reviewed by Reta Haney R.N. (Registered Nurse) on 03/02/22 at 1904  Med List Status: <None>     Medication Last Dose Status   hydroCHLOROthiazide (HYDRODIURIL) 25 MG Tab  Active   levothyroxine (SYNTHROID) 100 MCG Tab  Active   losartan (COZAAR) 100 MG Tab  Active                    ALLERGIES  Allergies   Allergen Reactions    Penicillins      'NASTY\"       PHYSICAL EXAM  VITAL SIGNS: BP (!) 183/91   Pulse 60   Temp 36 °C (96.8 °F) (Temporal)   Resp 18   Ht 1.626 m (5' 4\")   Wt 80 kg (176 lb 5.9 oz)   LMP  (LMP Unknown)   SpO2 95%   BMI 30.27 kg/m²      Constitutional: Alert in no apparent distress.  HENT: No signs of trauma, Bilateral external ears normal, Nose normal.   Eyes: Pupils are equal and reactive, Conjunctiva normal, Non-icteric.   Neck: Normal range of motion, No tenderness, Supple, No stridor.   Lymphatic: No lymphadenopathy noted.   Cardiovascular: Regular rate and rhythm, no palpable thrill  Thorax & Lungs: No respiratory distress,  No chest tenderness.  CTAB  Abdomen: Bowel sounds normal, Soft, No tenderness, No masses, No pulsatile masses. No peritoneal signs.  Skin: Warm, Dry, No erythema, No rash.   Back: No bony tenderness, No CVA tenderness.   Extremities: Intact distal pulses, No edema, No tenderness, No cyanosis.  Musculoskeletal: Good range of motion in all major joints. No tenderness to palpation or major deformities noted.   Neurologic: Alert , Normal motor function, Normal sensory function, No focal deficits noted. Symmetric smile, eyes shut tight bilaterally, forehead wrinkles bilaterally, sensation intact to light touch bilateral face, tongue midline, head turn and shoulder shrug with " full strength. Hearing intact grossly bilaterally. 5/5  strength bilaterally, 5/5 tricep and bicep strength bilaterally. Sensation intact to light touch r, m, u, axillary nerves bilaterally. 5/5 strength quadricep, plantarflexion/dorsiflexion/extensor hallicus longus bilaterally. Sensation intact to light touch bilateral lower extremities in all nerve distributions.  Intact finger-to-nose. Patient was ambulated and was able to stand on each leg without difficulty.  Psychiatric: Affect normal, Judgment normal, Mood normal.     DIAGNOSTIC STUDIES / PROCEDURES    EKG Interpretation:  Interpreted by me    12 Lead EKG interpreted by me to show:  Normal sinus rhythm  Rate 50  Axis: Normal  Intervals: Normal  Normal T waves  Normal ST segments, no significant elevations or depressions.  My impression of this EKG: Does not indicate ischemia or arrhythmia at this time.     LABS  Labs Reviewed   CBC WITH DIFFERENTIAL - Abnormal; Notable for the following components:       Result Value    WBC 14.9 (*)     Neutrophils-Polys 81.20 (*)     Lymphocytes 12.30 (*)     Neutrophils (Absolute) 12.08 (*)     All other components within normal limits   BASIC METABOLIC PANEL - Abnormal; Notable for the following components:    Glucose 108 (*)     Bun 25 (*)     All other components within normal limits   ESTIMATED GFR     All labs reviewed by me.    COURSE & MEDICAL DECISION MAKING  Pertinent Labs & Imaging studies reviewed. (See chart for details)    This is a 76 y.o. female that presents with an episode of vertigo.  At this time the patient has a nonfocal neurologic exam.  I will evaluate for causes of presyncope with a BMP as well as EKG and basic labs.  At this time I believe is also could be stress related.  Reassess after labs and EKG performed..     7:24 PM - Patient seen and examined at bedside. Ordered BMP, Estimated GFR, CBC w/diff, EKG. Discussed plan of care with patient. I informed them that labs and imaging will be  ordered to evaluate symptoms. Patient is understanding and agreeable with plan.      8:38 PM - Patient was reevaluated at bedside. I then informed the patient of my plan for discharge, which includes strict return precautions for any new or worsening symptoms. She understands and verbalizes agreement to plan of care. She is comfortable going home at this time.      The patient will return for new or worsening symptoms and is stable at the time of discharge.    Patient EKG is normal.  The patient does appear slightly dehydrated with a BUN to creatinine ratio for 20.  There is a slight leukocytosis which I believe is likely stress related.  I am hesitant to change the patient's blood pressure medications at this time.  I will discharge the patient home with strict return precautions and follow-up.    The patient is referred to a primary physician for blood pressure management, diabetic screening, and for all other preventative health concerns.    DISPOSITION:  Patient will be discharged home in stable condition.    FOLLOW UP:  Mercedes Rivas P.A.-C.  [address]  [facility] [address]  [phone]        FINAL IMPRESSION  1. Dehydration    2. Secondary hypertension          Jose WATSON (Arturo), am scribing for, and in the presence of, Oumar Grey M.D..    Electronically signed by: Jose Sandra (Arturo), 3/2/2022    Oumar WATSON M.D. personally performed the services described in this documentation, as scribed by Jose Sandra in my presence, and it is both accurate and complete.     The note accurately reflects work and decisions made by me.  Oumar Grey M.D.  3/3/2022  12:21 AM

## 2022-03-03 NOTE — TELEPHONE ENCOUNTER
Phone Number Called: [phone]    Call outcome: Left detailed message for patient. Informed to call back with any additional questions.    Message: Please call Nurse Debra (with [facility]) at [phone] to schedule a follow-up visit in your primary care office following your emergency room visit on 3/2/2022.       Patient returned my call, she is already scheduled with a new provider appointment for 3/9/22, I amended appointment note to include ED follow-up.

## 2022-03-09 ENCOUNTER — OFFICE VISIT (OUTPATIENT)
Dept: MEDICAL GROUP | Facility: PHYSICIAN GROUP | Age: 77
End: 2022-03-09
Payer: MEDICARE

## 2022-03-09 VITALS
HEART RATE: 75 BPM | SYSTOLIC BLOOD PRESSURE: 138 MMHG | DIASTOLIC BLOOD PRESSURE: 76 MMHG | TEMPERATURE: 97.2 F | RESPIRATION RATE: 16 BRPM | WEIGHT: 172 LBS | OXYGEN SATURATION: 93 % | BODY MASS INDEX: 29.37 KG/M2 | HEIGHT: 64 IN

## 2022-03-09 DIAGNOSIS — M25.512 CHRONIC LEFT SHOULDER PAIN: ICD-10-CM

## 2022-03-09 DIAGNOSIS — G89.29 CHRONIC LEFT SHOULDER PAIN: ICD-10-CM

## 2022-03-09 DIAGNOSIS — I10 PRIMARY HYPERTENSION: ICD-10-CM

## 2022-03-09 DIAGNOSIS — Z80.3 FAMILY HISTORY OF MALIGNANT NEOPLASM OF BREAST: ICD-10-CM

## 2022-03-09 DIAGNOSIS — E78.5 DYSLIPIDEMIA: ICD-10-CM

## 2022-03-09 DIAGNOSIS — R42 VERTIGO: ICD-10-CM

## 2022-03-09 DIAGNOSIS — Z00.00 HEALTHCARE MAINTENANCE: ICD-10-CM

## 2022-03-09 DIAGNOSIS — E03.9 ACQUIRED HYPOTHYROIDISM: ICD-10-CM

## 2022-03-09 PROBLEM — I34.1 MITRAL VALVE PROLAPSE: Status: ACTIVE | Noted: 2022-03-09

## 2022-03-09 PROBLEM — D72.829 LEUKOCYTOSIS: Status: ACTIVE | Noted: 2022-03-09

## 2022-03-09 PROCEDURE — 99215 OFFICE O/P EST HI 40 MIN: CPT | Performed by: FAMILY MEDICINE

## 2022-03-09 RX ORDER — OMEPRAZOLE 20 MG/1
CAPSULE, DELAYED RELEASE ORAL
COMMUNITY
Start: 2021-10-04 | End: 2022-03-09

## 2022-03-09 ASSESSMENT — PATIENT HEALTH QUESTIONNAIRE - PHQ9
CLINICAL INTERPRETATION OF PHQ2 SCORE: 2
5. POOR APPETITE OR OVEREATING: 1 - SEVERAL DAYS
SUM OF ALL RESPONSES TO PHQ QUESTIONS 1-9: 8

## 2022-03-09 NOTE — PROGRESS NOTES
Subjective:     CC:   Chief Complaint   Patient presents with   • Establish Care     New Pt    • Vertigo     Was seen in ED, on going    • Hypertension     Discuss rx    • Shoulder Pain     Hurts to move, L, x 3 months,        HISTORY OF THE PRESENT ILLNESS: Patient is a 76 y.o. female. This pleasant patient is here today to establish care and discuss hypertension, vertigo, and left shoulder pain.  Her prior PCP was MEREDITH Rivas.     Recent ED visit due to an onset of vertigo.  She was told she was chronically dehydrated.   States she is no longer taking HCTZ. Her mother recently  and she was her full-time caregiver as she had Lewy body dementia, states that she has moved 4 times in the last year and gained quite a bit of weight, 40 pounds.  She states that now that she is finally back home she is able to start distressing and taking care of herself more.  She is starting to lose weight and she is now walking at least 30 minutes daily.    Primary hypertension  Chronic, she has a longstanding history of hypertension to which she was taking hydrochlorothiazide 25 mg daily and losartan 100 mg daily for.  She recently was in the ED for vertigo and chronic dehydration.  At that time she stopped her hydrochlorothiazide and is now only currently taking the losartan 100 mg daily.  She was using a wrist cuff at home which was showing her blood pressures to be in the 180s over 90s, however today in clinic it is 138/82.  She denies any headaches, chest pains, shortness of breath, or dizziness.  States that she was having dizziness prior to stopping the hydrochlorothiazide.  Blood pressure log given today in clinic and patient is going to buy a new cuff and log home blood pressures.  We will follow up in 1 month.  She does have a history of having some orthostatic hypotension in the past last documented in 2019.  We will check orthostatics today in clinic  Layin/70, standing 1 min: 138/76, standing 3 min:  136/70    Family history of breast cancer  Mother, maternal grandmother, and maternal great-grandmother all had breast cancer.  She is refusing mammograms at this point.  She states that if she gets breast cancer she does not want treatment at her age.  She also declines any genetic testing at this time.    Dyslipidemia  Chronic, she states that she has a healthy diet.  States her only processed food she eats is beyond meat.  She is a vegetarian and eats lots of fruits and vegetables and grains.  We will recheck her lipid panel, has never been on statin therapy.  Denies any chest pains or shortness of breath    Acquired hypothyroidism  Chronic, history of hypothyroidism for many years.  She is currently on levothyroxine 100 mcg every morning on an empty stomach.  We will recheck her TSH with reflex to T4, and T3.  She states that she feels that her hair is more dry and brittle and falling out and she feels cold frequently and is having more constipation problems    Chronic left shoulder pain  Chronic, states that her left shoulder has been hurting over the last 3 to 6 months.  She denies any trauma or falls that have caused this.  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  She does not have decreased range of motion or strength in her shoulder, arm or hands.  Pulses intact.  She declines an x-ray or seeing Ortho at this time.  She states that she would like to wait a few more weeks and if her pain does not improve she will do imaging at that time.  She states that she had this issue with her right shoulder many years ago and had a cortisone injection and then it went away.  We will discuss again at our appointment in 1 month.      Current Outpatient Medications Ordered in Epic   Medication Sig Dispense Refill   • levothyroxine (SYNTHROID) 100 MCG Tab TAKE 1 TABLET BY MOUTH EVERY DAY IN THE MORNING ON AN EMPTY STOMACH 30 Tab 0   • losartan  "(COZAAR) 100 MG Tab TAKE 1 TABLET BY MOUTH EVERY DAY 30 Tab 0     No current Epic-ordered facility-administered medications on file.       Health Maintenance: Completed    ROS:   Gen: no fevers/chills, no changes in weight  Eyes: no changes in vision  ENT: no sore throat, no hearing loss, no bloody nose  Pulm: no sob, no cough  CV: no chest pain, no palpitations  GI: no nausea/vomiting, no diarrhea  : no dysuria  MSk: no myalgias, + L shoulder pain  Skin: no rash  Neuro: no headaches, no numbness/tingling  Heme/Lymph: no easy bruising      Objective:       Exam: /82 (BP Location: Left arm, Patient Position: Sitting, BP Cuff Size: Adult)   Pulse 74   Temp 36.2 °C (97.2 °F) (Temporal)   Resp 16   Ht 1.626 m (5' 4\")   Wt 78 kg (172 lb)   SpO2 93%  Body mass index is 29.52 kg/m².    General: Normal appearing. No distress.  HEENT: Normocephalic. Eyes conjunctiva clear lids without ptosis, pupils equal and reactive to light accommodation, ears normal shape and contour, canals are clear bilaterally, tympanic membranes are benign, nasal mucosa benign, oropharynx is without erythema, edema or exudates.   Neck: Supple without JVD or bruit. Thyroid is not enlarged.  Pulmonary: Clear to ausculation.  Normal effort. No rales, ronchi, or wheezing.  Cardiovascular: Regular rate and rhythm without murmur. Carotid and radial pulses are intact and equal bilaterally.  Abdomen: Soft, nontender, nondistended. Normal bowel sounds. Liver and spleen are not palpable  Neurologic: Grossly nonfocal  Lymph: No cervical or supraclavicular lymph nodes are palpable  Skin: Warm and dry.  No obvious lesions.  Musculoskeletal: Normal gait. No extremity cyanosis, clubbing, or edema.  Psych: Normal mood and affect. Alert and oriented x3. Judgment and insight is normal.    A chaperone was offered to the patient during today's exam. Patient declined chaperone.      Assessment & Plan:   76 y.o. female with the following -    1. " Vertigo  Acute problem, she was in the ED on 3/2/2022.  MD noted it was secondary due to dehydration.  She states that her dizziness has improved ever since she stopped the hydrochlorothiazide she feels back to normal as of yesterday.  We will continue to monitor    2. Acquired hypothyroidism  Chronic, she is currently symptomatic of hypothyroid symptoms such as constipation, fatigue, dry and brittle hair.  We will recheck her TSH and adjust her medication as needed.  - TSH WITH REFLEX TO FT4; Future  - T3 FREE; Future    3. Healthcare maintenance  - CBC WITHOUT DIFFERENTIAL; Future  - Comp Metabolic Panel; Future  - VITAMIN D,25 HYDROXY; Future  - VITAMIN B12; Future  - Lipid Profile; Future    4. Primary hypertension  Chronic, stable on losartan 100 mg daily.  She recently stopped her hydrochlorothiazide 25 mg tablet over the last few days.  BP log given in clinic and will buy a new blood pressure cuff and check blood pressures at home.  Discussed signs and symptoms of high blood pressure and to contact our clinic if her blood pressure is consistently running over 140s over 90s.    5. Family history of breast cancer  Chronic, declines mammogram despite her family history of breast cancer.      6. Dyslipidemia  Chronic, has not been on statin therapy in the past.  Will recheck her lipid panel.  Discussed increasing exercise to at least 30 minutes daily.  We will follow-up next month after lab work is repeated.      7. Chronic left shoulder pain  Chronic over the last 3 to 6 months.  Declines x-ray or Ortho referral at this time.  If it does not improve by next visit we will do a follow-up shoulder exam and imaging as needed.    I spent a total of 50 minutes with record review, exam, communication with the patient, communication with other providers, and documentation of this encounter.    Return in about 1 month (around 4/9/2022) for F/U labs, F/U BP.    Please note that this dictation was created using voice  recognition software. I have made every reasonable attempt to correct obvious errors, but I expect that there are errors of grammar and possibly content that I did not discover before finalizing the note.

## 2022-03-09 NOTE — ASSESSMENT & PLAN NOTE
Chronic, she has a longstanding history of hypertension to which she was taking hydrochlorothiazide 25 mg daily and losartan 100 mg daily for.  She recently was in the ED for vertigo and chronic dehydration.  At that time she stopped her hydrochlorothiazide and is now only currently taking the losartan 100 mg daily.  She was using a wrist cuff at home which was showing her blood pressures to be in the 180s over 90s, however today in clinic it is 138/82.  She denies any headaches, chest pains, shortness of breath, or dizziness.  States that she was having dizziness prior to stopping the hydrochlorothiazide.  Blood pressure log given today in clinic and patient is going to buy a new cuff and log home blood pressures.  We will follow up in 1 month.  She does have a history of having some orthostatic hypotension in the past last documented in 2019.  We will check orthostatics today in clinic  Layin/70, standing 1 min: 138/76, standing 3 min: 136/70

## 2022-03-09 NOTE — ASSESSMENT & PLAN NOTE
Chronic, history of hypothyroidism for many years.  She is currently on levothyroxine 100 mcg every morning on an empty stomach.  We will recheck her TSH with reflex to T4, and T3.  She states that she feels that her hair is more dry and brittle and falling out and she feels cold frequently and is having more constipation problems

## 2022-03-09 NOTE — ASSESSMENT & PLAN NOTE
Chronic, states that her left shoulder has been hurting over the last 3 to 6 months.  She denies any trauma or falls that have caused this.  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  She does not have decreased range of motion or strength in her shoulder, arm or hands.  Pulses intact.  She declines an x-ray or seeing Ortho at this time.  She states that she would like to wait a few more weeks and if her pain does not improve she will do imaging at that time.  She states that she had this issue with her right shoulder many years ago and had a cortisone injection and then it went away.  We will discuss again at our appointment in 1 month.

## 2022-03-09 NOTE — ASSESSMENT & PLAN NOTE
Mother, maternal grandmother, and maternal great-grandmother all had breast cancer.  She is refusing mammograms at this point.  She states that if she gets breast cancer she does not want treatment at her age.  She also declines any genetic testing at this time.

## 2022-03-09 NOTE — ASSESSMENT & PLAN NOTE
Chronic, she states that she has a healthy diet.  States her only processed food she eats is beyond meat.  She is a vegetarian and eats lots of fruits and vegetables and grains.  We will recheck her lipid panel, has never been on statin therapy.  Denies any chest pains or shortness of breath

## 2022-03-10 ENCOUNTER — OFFICE VISIT (OUTPATIENT)
Dept: MEDICAL GROUP | Facility: PHYSICIAN GROUP | Age: 77
End: 2022-03-10
Payer: MEDICARE

## 2022-03-10 VITALS
WEIGHT: 172.8 LBS | DIASTOLIC BLOOD PRESSURE: 72 MMHG | SYSTOLIC BLOOD PRESSURE: 140 MMHG | TEMPERATURE: 97 F | HEIGHT: 64 IN | BODY MASS INDEX: 29.5 KG/M2 | OXYGEN SATURATION: 98 % | HEART RATE: 62 BPM | RESPIRATION RATE: 16 BRPM

## 2022-03-10 DIAGNOSIS — R51.9 GENERALIZED HEADACHES: ICD-10-CM

## 2022-03-10 DIAGNOSIS — R42 VERTIGO: ICD-10-CM

## 2022-03-10 DIAGNOSIS — D72.829 LEUKOCYTOSIS, UNSPECIFIED TYPE: ICD-10-CM

## 2022-03-10 DIAGNOSIS — E03.9 ACQUIRED HYPOTHYROIDISM: ICD-10-CM

## 2022-03-10 DIAGNOSIS — H93.13 TINNITUS OF BOTH EARS: ICD-10-CM

## 2022-03-10 DIAGNOSIS — I10 PRIMARY HYPERTENSION: ICD-10-CM

## 2022-03-10 DIAGNOSIS — R73.03 PREDIABETES: ICD-10-CM

## 2022-03-10 DIAGNOSIS — E86.0 DEHYDRATION: ICD-10-CM

## 2022-03-10 DIAGNOSIS — I34.1 MITRAL VALVE PROLAPSE: ICD-10-CM

## 2022-03-10 LAB
APPEARANCE UR: CLEAR
BILIRUB UR STRIP-MCNC: NORMAL MG/DL
COLOR UR AUTO: YELLOW
GLUCOSE UR STRIP.AUTO-MCNC: NORMAL MG/DL
KETONES UR STRIP.AUTO-MCNC: NORMAL MG/DL
LEUKOCYTE ESTERASE UR QL STRIP.AUTO: NORMAL
NITRITE UR QL STRIP.AUTO: NORMAL
PH UR STRIP.AUTO: 5.5 [PH] (ref 5–8)
PROT UR QL STRIP: NORMAL MG/DL
RBC UR QL AUTO: NORMAL
SP GR UR STRIP.AUTO: 1
UROBILINOGEN UR STRIP-MCNC: 0.2 MG/DL

## 2022-03-10 PROCEDURE — 81002 URINALYSIS NONAUTO W/O SCOPE: CPT | Performed by: FAMILY MEDICINE

## 2022-03-10 PROCEDURE — 99214 OFFICE O/P EST MOD 30 MIN: CPT | Performed by: FAMILY MEDICINE

## 2022-03-10 RX ORDER — LOSARTAN POTASSIUM 100 MG/1
100 TABLET ORAL
Qty: 30 TABLET | Refills: 0 | Status: SHIPPED | OUTPATIENT
Start: 2022-03-10 | End: 2022-04-04 | Stop reason: SDUPTHER

## 2022-03-10 RX ORDER — LEVOTHYROXINE SODIUM 0.1 MG/1
TABLET ORAL
Qty: 30 TABLET | Refills: 0 | Status: SHIPPED | OUTPATIENT
Start: 2022-03-10 | End: 2022-04-04 | Stop reason: SDUPTHER

## 2022-03-10 SDOH — ECONOMIC STABILITY: HOUSING INSECURITY
IN THE LAST 12 MONTHS, WAS THERE A TIME WHEN YOU DID NOT HAVE A STEADY PLACE TO SLEEP OR SLEPT IN A SHELTER (INCLUDING NOW)?: NO

## 2022-03-10 SDOH — ECONOMIC STABILITY: FOOD INSECURITY: WITHIN THE PAST 12 MONTHS, THE FOOD YOU BOUGHT JUST DIDN'T LAST AND YOU DIDN'T HAVE MONEY TO GET MORE.: NEVER TRUE

## 2022-03-10 SDOH — ECONOMIC STABILITY: TRANSPORTATION INSECURITY
IN THE PAST 12 MONTHS, HAS LACK OF RELIABLE TRANSPORTATION KEPT YOU FROM MEDICAL APPOINTMENTS, MEETINGS, WORK OR FROM GETTING THINGS NEEDED FOR DAILY LIVING?: NO

## 2022-03-10 SDOH — HEALTH STABILITY: PHYSICAL HEALTH: ON AVERAGE, HOW MANY MINUTES DO YOU ENGAGE IN EXERCISE AT THIS LEVEL?: 30 MIN

## 2022-03-10 SDOH — HEALTH STABILITY: MENTAL HEALTH
STRESS IS WHEN SOMEONE FEELS TENSE, NERVOUS, ANXIOUS, OR CAN'T SLEEP AT NIGHT BECAUSE THEIR MIND IS TROUBLED. HOW STRESSED ARE YOU?: TO SOME EXTENT

## 2022-03-10 SDOH — ECONOMIC STABILITY: INCOME INSECURITY: IN THE LAST 12 MONTHS, WAS THERE A TIME WHEN YOU WERE NOT ABLE TO PAY THE MORTGAGE OR RENT ON TIME?: NO

## 2022-03-10 SDOH — ECONOMIC STABILITY: FOOD INSECURITY: WITHIN THE PAST 12 MONTHS, YOU WORRIED THAT YOUR FOOD WOULD RUN OUT BEFORE YOU GOT MONEY TO BUY MORE.: NEVER TRUE

## 2022-03-10 SDOH — HEALTH STABILITY: PHYSICAL HEALTH: ON AVERAGE, HOW MANY DAYS PER WEEK DO YOU ENGAGE IN MODERATE TO STRENUOUS EXERCISE (LIKE A BRISK WALK)?: 3 DAYS

## 2022-03-10 SDOH — ECONOMIC STABILITY: TRANSPORTATION INSECURITY
IN THE PAST 12 MONTHS, HAS LACK OF TRANSPORTATION KEPT YOU FROM MEETINGS, WORK, OR FROM GETTING THINGS NEEDED FOR DAILY LIVING?: NO

## 2022-03-10 SDOH — ECONOMIC STABILITY: HOUSING INSECURITY: IN THE LAST 12 MONTHS, HOW MANY PLACES HAVE YOU LIVED?: 3

## 2022-03-10 SDOH — ECONOMIC STABILITY: TRANSPORTATION INSECURITY
IN THE PAST 12 MONTHS, HAS THE LACK OF TRANSPORTATION KEPT YOU FROM MEDICAL APPOINTMENTS OR FROM GETTING MEDICATIONS?: NO

## 2022-03-10 SDOH — ECONOMIC STABILITY: INCOME INSECURITY: HOW HARD IS IT FOR YOU TO PAY FOR THE VERY BASICS LIKE FOOD, HOUSING, MEDICAL CARE, AND HEATING?: NOT HARD AT ALL

## 2022-03-10 ASSESSMENT — ENCOUNTER SYMPTOMS
EYE PAIN: 0
FOCAL WEAKNESS: 0
NAUSEA: 0
SORE THROAT: 0
CHILLS: 0
SHORTNESS OF BREATH: 0
SENSORY CHANGE: 0
COUGH: 0
SPEECH CHANGE: 0
FEVER: 0
VOMITING: 0
DOUBLE VISION: 0
DIZZINESS: 1
TREMORS: 0
WEAKNESS: 0
PALPITATIONS: 0
PHOTOPHOBIA: 0
MYALGIAS: 0
HEADACHES: 1
BLURRED VISION: 0

## 2022-03-10 ASSESSMENT — SOCIAL DETERMINANTS OF HEALTH (SDOH)
HOW OFTEN DO YOU ATTEND CHURCH OR RELIGIOUS SERVICES?: MORE THAN 4 TIMES PER YEAR
HOW OFTEN DO YOU HAVE SIX OR MORE DRINKS ON ONE OCCASION: NEVER
HOW OFTEN DO YOU HAVE A DRINK CONTAINING ALCOHOL: MONTHLY OR LESS
HOW OFTEN DO YOU ATTENT MEETINGS OF THE CLUB OR ORGANIZATION YOU BELONG TO?: MORE THAN 4 TIMES PER YEAR
IN A TYPICAL WEEK, HOW MANY TIMES DO YOU TALK ON THE PHONE WITH FAMILY, FRIENDS, OR NEIGHBORS?: MORE THAN THREE TIMES A WEEK
HOW OFTEN DO YOU GET TOGETHER WITH FRIENDS OR RELATIVES?: MORE THAN THREE TIMES A WEEK
WITHIN THE PAST 12 MONTHS, YOU WORRIED THAT YOUR FOOD WOULD RUN OUT BEFORE YOU GOT THE MONEY TO BUY MORE: NEVER TRUE
HOW OFTEN DO YOU GET TOGETHER WITH FRIENDS OR RELATIVES?: MORE THAN THREE TIMES A WEEK
DO YOU BELONG TO ANY CLUBS OR ORGANIZATIONS SUCH AS CHURCH GROUPS UNIONS, FRATERNAL OR ATHLETIC GROUPS, OR SCHOOL GROUPS?: YES
DO YOU BELONG TO ANY CLUBS OR ORGANIZATIONS SUCH AS CHURCH GROUPS UNIONS, FRATERNAL OR ATHLETIC GROUPS, OR SCHOOL GROUPS?: YES
HOW MANY DRINKS CONTAINING ALCOHOL DO YOU HAVE ON A TYPICAL DAY WHEN YOU ARE DRINKING: 1 OR 2
IN A TYPICAL WEEK, HOW MANY TIMES DO YOU TALK ON THE PHONE WITH FAMILY, FRIENDS, OR NEIGHBORS?: MORE THAN THREE TIMES A WEEK
HOW OFTEN DO YOU ATTEND CHURCH OR RELIGIOUS SERVICES?: MORE THAN 4 TIMES PER YEAR
HOW OFTEN DO YOU ATTENT MEETINGS OF THE CLUB OR ORGANIZATION YOU BELONG TO?: MORE THAN 4 TIMES PER YEAR
HOW HARD IS IT FOR YOU TO PAY FOR THE VERY BASICS LIKE FOOD, HOUSING, MEDICAL CARE, AND HEATING?: NOT HARD AT ALL

## 2022-03-10 ASSESSMENT — LIFESTYLE VARIABLES
HOW OFTEN DO YOU HAVE SIX OR MORE DRINKS ON ONE OCCASION: NEVER
HOW OFTEN DO YOU HAVE A DRINK CONTAINING ALCOHOL: MONTHLY OR LESS
HOW MANY STANDARD DRINKS CONTAINING ALCOHOL DO YOU HAVE ON A TYPICAL DAY: 1 OR 2

## 2022-03-10 NOTE — PROGRESS NOTES
Subjective:     CC:  Diagnoses of Prediabetes, Dehydration, Vertigo, Mitral valve prolapse, Leukocytosis, unspecified type, Acquired hypothyroidism, Primary hypertension, Tinnitus of both ears, and Generalized headaches were pertinent to this visit.    HISTORY OF THE PRESENT ILLNESS: Patient is a 76 y.o. female. This pleasant patient is here today to establish care and discuss    1) est care ER f/u vertigo/dehydration/headaches,tinnitus  Went to [facility]    Pt reports concerns that losartan caused the vertigo  bp in ER 180s  Current 140/72  Still central HA no blurry vision  Reports fuzzy feeling  States there was an Aura prior to her visit to ER    Still has headache 6/10   Took 3 excedrine  Pt is drinking 64 oz o2 or more a day    Chronic tinnitus years    Dehydration?  No UA   POCT 1.005  Pt states she upped the O2 after  Was not hydrating well    Dec returned from care of mother in [place]  Stressful  Has good support here   Still stressors with [place] state     No CP no SOB    Needs short f/u      Problem   Dehydration   Prediabetes   Generalized Headaches   Vertigo   Tinnitus   Primary Hypertension    Chronic, she has a longstanding history of hypertension to which she was taking hydrochlorothiazide 25 mg daily and losartan 100 mg daily for.  She recently was in the ED for vertigo and chronic dehydration.  At that time she stopped her hydrochlorothiazide and is now only currently taking the losartan 100 mg daily.  She was using a wrist cuff at home which was showing her blood pressures to be in the 180s over 90s, however today in clinic it is 138/82.  She denies any headaches, chest pains, shortness of breath, or dizziness.  States that she was having dizziness prior to stopping the hydrochlorothiazide.  Blood pressure log given today in clinic and patient is going to buy a new cuff and log home blood pressures.  We will follow up in 1 month.         Current Outpatient Medications Ordered in Epic   Medication Sig  "Dispense Refill   • losartan (COZAAR) 100 MG Tab Take 1 Tablet by mouth every day. 30 Tablet 0   • levothyroxine (SYNTHROID) 100 MCG Tab TAKE 1 TABLET BY MOUTH EVERY DAY IN THE MORNING ON AN EMPTY STOMACH 30 Tablet 0     No current Epic-ordered facility-administered medications on file.     ROS:   Review of Systems   Constitutional: Negative for chills and fever.   HENT: Positive for tinnitus. Negative for ear pain, hearing loss and sore throat.    Eyes: Negative for blurred vision, double vision, photophobia and pain.   Respiratory: Negative for cough and shortness of breath.    Cardiovascular: Negative for chest pain and palpitations.   Gastrointestinal: Negative for nausea and vomiting.   Genitourinary: Negative for dysuria and hematuria.   Musculoskeletal: Negative for myalgias.   Neurological: Positive for dizziness and headaches. Negative for tremors, sensory change, speech change, focal weakness and weakness.         Objective:     Exam: /72 (BP Location: Left arm, Patient Position: Sitting, BP Cuff Size: Adult)   Pulse 62   Temp 36.1 °C (97 °F) (Temporal)   Resp 16   Ht 1.626 m (5' 4\")   Wt 78.4 kg (172 lb 12.8 oz)   SpO2 98%  Body mass index is 29.66 kg/m².    Physical Exam  Constitutional:       Appearance: Normal appearance.   HENT:      Head: Normocephalic and atraumatic.   Eyes:      General: No scleral icterus.        Right eye: No discharge.         Left eye: No discharge.      Extraocular Movements: Extraocular movements intact.      Conjunctiva/sclera: Conjunctivae normal.      Pupils: Pupils are equal, round, and reactive to light.   Cardiovascular:      Rate and Rhythm: Normal rate and regular rhythm.      Pulses: Normal pulses.      Heart sounds: Normal heart sounds. No murmur heard.  Pulmonary:      Effort: Pulmonary effort is normal. No respiratory distress.      Breath sounds: Normal breath sounds.   Musculoskeletal:      Cervical back: Normal range of motion and neck supple. No " rigidity.   Neurological:      Mental Status: She is alert.      Coordination: Coordination normal.      Gait: Gait normal.         Assessment & Plan:   76 y.o. female with the following -    Problem List Items Addressed This Visit     Primary hypertension     Chronic stable  Pt states she thinks the losartan is causing dehydration  UA today 1.005,   Unlikely  Pt is drinking 64 oz water daily  Or more  rtc 1 wk  Will discuss amlodipine  Avoiding HCTZ         Relevant Medications    losartan (COZAAR) 100 MG Tab    Acquired hypothyroidism    Relevant Medications    levothyroxine (SYNTHROID) 100 MCG Tab    Tinnitus     Unknown etiology, with the headaches, and vertigo  Neuro eval           Relevant Orders    Referral to Neurology    Leukocytosis    Mitral valve prolapse    Relevant Medications    losartan (COZAAR) 100 MG Tab    Other Relevant Orders    REFERRAL TO CARDIOLOGY    Vertigo     Unknown etiololgy  Unlikely cardiogenic  Electrolye/dilute endolymph  Reports chronic tinitus and headaches  Needs eval with neuro         Relevant Orders    Referral to Neurology    Dehydration     This is a repeat test, as the ER told her she had dehydration but there is no urine sample.    POCT urine  Dilute,  1.005 SG   Pt has been water drinking 64 oz per day or more         Relevant Orders    POCT Urinalysis (Completed)    Prediabetes    Generalized headaches     Chronic  Pt to keep HA diary  Discussed Red Flags for Er eval  Cont hydration  F/u labs  rtc in 1 wk         Relevant Orders    Referral to Neurology        Return in about 1 week (around 3/17/2022), or if symptoms worsen or fail to improve.    Please note that this dictation was created using voice recognition software. I have made every reasonable attempt to correct obvious errors, but I expect that there are errors of grammar and possibly content that I did not discover before finalizing the note.

## 2022-03-11 NOTE — ASSESSMENT & PLAN NOTE
Unknown etiololgy  Unlikely cardiogenic  Electrolye/dilute endolymph  Reports chronic tinitus and headaches  Needs eval with neuro

## 2022-03-11 NOTE — ASSESSMENT & PLAN NOTE
This is a repeat test, as the ER told her she had dehydration but there is no urine sample.    POCT urine  Dilute,  1.005 SG   Pt has been water drinking 64 oz per day or more

## 2022-03-11 NOTE — ASSESSMENT & PLAN NOTE
Chronic  Pt to keep HA diary  Discussed Red Flags for Er eval  Cont hydration  F/u labs  rtc in 1 wk

## 2022-03-11 NOTE — ASSESSMENT & PLAN NOTE
Chronic stable  Pt states she thinks the losartan is causing dehydration  UA today 1.005,   Unlikely  Pt is drinking 64 oz water daily  Or more  rtc 1 wk  Will discuss amlodipine  Avoiding HCTZ

## 2022-03-14 ENCOUNTER — NON-PROVIDER VISIT (OUTPATIENT)
Dept: MEDICAL GROUP | Facility: PHYSICIAN GROUP | Age: 77
End: 2022-03-14
Payer: MEDICARE

## 2022-03-14 VITALS — DIASTOLIC BLOOD PRESSURE: 84 MMHG | SYSTOLIC BLOOD PRESSURE: 170 MMHG

## 2022-03-14 DIAGNOSIS — I10 PRIMARY HYPERTENSION: ICD-10-CM

## 2022-03-14 RX ORDER — AMLODIPINE BESYLATE 5 MG/1
5 TABLET ORAL DAILY
Qty: 30 TABLET | Refills: 0 | Status: SHIPPED | OUTPATIENT
Start: 2022-03-14 | End: 2022-03-18

## 2022-03-14 NOTE — NON-PROVIDER
Here for BP check & check of home BP monitor.    This morning, 3/14/22, took home BP @ about 8:30 a.m. & it was 189/89.  Took it a second time & it was 169/80 @ 8:49.    Is experiencing dizziness.  Has had vertigo for years but this is the worst it has been.  Also has HAs, started a couple of weeks before went to ED.    Took Excedrin (2 tablets) @ 0700 this morning, took another one at 0930.  Still has HA now but improved, 3/10.  Dizziness is about the same.  HA this morning was 8/10.  Been having HA 1 1/2 weeks before going into ED (3/2/22).  HA intensity has not changed.      Discussed pt's situation with Mr. Hammonds, he added another BP med.  Pt instructed to keep BP log for 1 week, begin new med ASAP, get new BP monitor, got to ED if symptoms get worse, & scheduled follow-up appointment w/PCP for 1 week.

## 2022-03-14 NOTE — PROGRESS NOTES
Patient presents today with our clinic RN for blood pressure check.  She is found to be 194/80 with repeat measure of 190/84.  She was in ER for headaches/vertigo with elevated blood pressure on 3/2/2022.  She is fully compliant with her losartan 100 mg daily.    -Amlodipine 5 mg daily.  Dispense 30.  Refill 0.  -Schedule with her primary care physician in about 1 week.  -Keep a blood pressure log, beginning tomorrow, 1 reading each day until her appointment with her PCP next week.    Harjeet Hammonds PA-C

## 2022-03-18 ENCOUNTER — OFFICE VISIT (OUTPATIENT)
Dept: MEDICAL GROUP | Facility: PHYSICIAN GROUP | Age: 77
End: 2022-03-18
Payer: MEDICARE

## 2022-03-18 VITALS
SYSTOLIC BLOOD PRESSURE: 148 MMHG | TEMPERATURE: 97.8 F | RESPIRATION RATE: 16 BRPM | DIASTOLIC BLOOD PRESSURE: 76 MMHG | BODY MASS INDEX: 29.3 KG/M2 | HEIGHT: 64 IN | WEIGHT: 171.6 LBS | OXYGEN SATURATION: 98 % | HEART RATE: 74 BPM

## 2022-03-18 DIAGNOSIS — S43.421A SPRAIN OF RIGHT ROTATOR CUFF CAPSULE, INITIAL ENCOUNTER: ICD-10-CM

## 2022-03-18 DIAGNOSIS — I10 PRIMARY HYPERTENSION: ICD-10-CM

## 2022-03-18 DIAGNOSIS — K12.2 ORAL INFECTION: ICD-10-CM

## 2022-03-18 PROCEDURE — 99214 OFFICE O/P EST MOD 30 MIN: CPT | Performed by: STUDENT IN AN ORGANIZED HEALTH CARE EDUCATION/TRAINING PROGRAM

## 2022-03-18 RX ORDER — AMOXICILLIN 500 MG/1
2000 CAPSULE ORAL
COMMUNITY
Start: 2022-03-15

## 2022-03-18 RX ORDER — AMLODIPINE BESYLATE 10 MG/1
10 TABLET ORAL DAILY
Qty: 30 TABLET | Refills: 2 | Status: SHIPPED | OUTPATIENT
Start: 2022-03-18 | End: 2022-04-07

## 2022-03-18 RX ORDER — PENICILLIN V POTASSIUM 500 MG/1
500 TABLET ORAL 4 TIMES DAILY
Qty: 28 TABLET | Refills: 0 | Status: SHIPPED | OUTPATIENT
Start: 2022-03-18 | End: 2022-04-07

## 2022-03-18 RX ORDER — AMLODIPINE BESYLATE 5 MG/1
10 TABLET ORAL DAILY
Qty: 30 TABLET | Refills: 0 | Status: SHIPPED | OUTPATIENT
Start: 2022-03-18 | End: 2022-03-18

## 2022-03-18 NOTE — PROGRESS NOTES
CC:  Diagnoses of Primary hypertension, Sprain of right rotator cuff capsule, initial encounter, and Oral infection were pertinent to this visit.    HISTORY OF THE PRESENT ILLNESS: Patient is a 76 y.o. female. This pleasant patient is here today for blood pressure follow up dental infection and shoulder pain. Her PCP is Dr. Ibrahima Anthony MD.    1. Primary hypertension follow-up  Patient presented to clinic for a non-provider visit for hypertension follow-up and was found to be very hypertensive with systolics in the 160s to nearly 190s.  Amlodipine 5 mg daily was added to her regimen.  She presents today with a new blood pressure log showing no blood pressures under 140s and most of them in the 160s.  She has been fully compliant with both her amlodipine and losartan.  She reports no dependent edema.    2.  Sprain of right rotator cuff, initial encounter  Onset 2 months ago.  This occurred while caring for her mother, doing a lot of lifting.  Location left shoulder.  Constant pain 7 out of 10.  Pain is characterized as very bad aching that is made worse by movement.  She has been using aspirin to treat this with incomplete success.  She tells me that Tylenol has no effect for her and that ibuprofen and naproxen both cause substantial constipation.  She had a very similar injury to her right shoulder many years ago and received 1 corticosteroid injection which provided relief to this day.  She is here seeing if another corticosteroid injection of her left shoulder might be an option.    3.  Oral infection  Patient has an oral abscess that will require dental intervention.  The soonest her dentist can see her is on 12 April.  She is hoping to have some alleviation of this if possible today.    No problem-specific Assessment & Plan notes found for this encounter.      Current Outpatient Medications Ordered in Epic   Medication Sig Dispense Refill   • amoxicillin (AMOXIL) 500 MG Cap      • amLODIPine (NORVASC) 10 MG  "Tab Take 1 Tablet by mouth every day. 30 Tablet 2   • penicillin v potassium (VEETID) 500 MG Tab Take 1 Tablet by mouth 4 times a day. 28 Tablet 0   • losartan (COZAAR) 100 MG Tab Take 1 Tablet by mouth every day. 30 Tablet 0   • levothyroxine (SYNTHROID) 100 MCG Tab TAKE 1 TABLET BY MOUTH EVERY DAY IN THE MORNING ON AN EMPTY STOMACH 30 Tablet 0     No current Epic-ordered facility-administered medications on file.     ROS:   Gen: no fevers/chills, unplanned changes in weight  See HPI.    Objective:     Exam: /76 (BP Location: Left arm, Patient Position: Sitting, BP Cuff Size: Adult)   Pulse 74   Temp 36.6 °C (97.8 °F) (Temporal)   Resp 16   Ht 1.626 m (5' 4\")   Wt 77.8 kg (171 lb 9.6 oz)   SpO2 98%  Body mass index is 29.46 kg/m².    General: Normal appearing. No distress.  HEENT: Normocephalic.  A small abscess of the gum between #28 and #29 is visualized.  There is a pustule.  This is nondraining.  There is some erythema and swelling.  This does appear to be emanating from the base of #29.  Neurologic: Grossly nonfocal  Lymph: No cervical or supraclavicular lymph nodes are palpable.  Right-sided submandibular tenderness to palpation with no lymph nodes appreciated.  Skin: Warm and dry.  No obvious lesions.  Musculoskeletal: Normal gait. No extremity cyanosis, clubbing, or edema.  Bilateral shoulders: Left shoulder normal in all exams.  Right shoulder has reduced active ROM due to pain.  Some slight swelling.  No overlying erythema.  Negative liftoff test.  Negative internal/external rotation test.  Strongly positive Gladys's test.  Negative drop arm test.    A chaperone was offered to the patient during today's exam. Patient declined chaperone.    Labs:   3/2/2022:  -CMP, normal GFR.    Assessment & Plan:   76 y.o. female with the following -    1.  Primary hypertension  -Chronic, unstable.  -Continue losartan 100 mg daily.  -Increased dose of amlodipine to 10 mg daily.  Dispense 30.  Refill 2.  -I have " asked the patient to return another blood pressure log next week.    2.  Sprain right rotator cuff, initial encounter  -Acute uncomplicated injury.  -Referral to physiatry for evaluation and treatment.  -We will consider physical therapy if her symptoms continue.    3.  Dental abscess  -Acute uncomplicated illness.  -The patient is well aware that the only definitive treatment is drainage of the abscess and repair of adjacent structures by a qualified dentist.  She already has an appointment and has not been able to get in any sooner.  -Penicillin  mg 4 times daily.  Dispense 28.  Refill 0.    Return if symptoms worsen or fail to improve.    Please note that this dictation was created using voice recognition software. I have made every reasonable attempt to correct obvious errors, but I expect that there are errors of grammar and possibly content that I did not discover before finalizing the note.    Harjeet Hammonds PA-C 3/18/2022    I have reviewed and agree with history, assessment and plan for office encounter on 3/18/2022 with Advanced Practice Provider: Harjeet Hammonds PA-C.  Face to face encounter/direct observation: No  Suggested changes to plan or follow-up: none   Alexandra Orona M.D.

## 2022-03-22 ENCOUNTER — TELEPHONE (OUTPATIENT)
Dept: HEALTH INFORMATION MANAGEMENT | Facility: OTHER | Age: 77
End: 2022-03-22
Payer: MEDICARE

## 2022-03-24 ENCOUNTER — OFFICE VISIT (OUTPATIENT)
Dept: SPORTS MEDICINE | Facility: CLINIC | Age: 77
End: 2022-03-24
Payer: MEDICARE

## 2022-03-24 ENCOUNTER — APPOINTMENT (OUTPATIENT)
Dept: RADIOLOGY | Facility: IMAGING CENTER | Age: 77
End: 2022-03-24
Attending: FAMILY MEDICINE
Payer: MEDICARE

## 2022-03-24 VITALS
RESPIRATION RATE: 16 BRPM | OXYGEN SATURATION: 97 % | HEIGHT: 64 IN | TEMPERATURE: 98.2 F | SYSTOLIC BLOOD PRESSURE: 126 MMHG | DIASTOLIC BLOOD PRESSURE: 82 MMHG | BODY MASS INDEX: 29.3 KG/M2 | WEIGHT: 171.6 LBS | HEART RATE: 84 BPM

## 2022-03-24 DIAGNOSIS — M25.512 CHRONIC LEFT SHOULDER PAIN: ICD-10-CM

## 2022-03-24 DIAGNOSIS — G89.29 CHRONIC LEFT SHOULDER PAIN: ICD-10-CM

## 2022-03-24 PROCEDURE — 20610 DRAIN/INJ JOINT/BURSA W/O US: CPT | Mod: LT | Performed by: FAMILY MEDICINE

## 2022-03-24 PROCEDURE — 73030 X-RAY EXAM OF SHOULDER: CPT | Mod: TC,LT | Performed by: PHYSICIAN ASSISTANT

## 2022-03-24 PROCEDURE — 99213 OFFICE O/P EST LOW 20 MIN: CPT | Mod: 25 | Performed by: FAMILY MEDICINE

## 2022-03-24 RX ORDER — TRIAMCINOLONE ACETONIDE 40 MG/ML
40 INJECTION, SUSPENSION INTRA-ARTICULAR; INTRAMUSCULAR ONCE
Status: COMPLETED | OUTPATIENT
Start: 2022-03-24 | End: 2022-03-24

## 2022-03-24 RX ADMIN — TRIAMCINOLONE ACETONIDE 40 MG: 40 INJECTION, SUSPENSION INTRA-ARTICULAR; INTRAMUSCULAR at 15:29

## 2022-03-24 NOTE — PROCEDURES
PROCEDURE NOTE:  left Shoulder subacromial injection  Risks and benefits discussed  Informed consent obtained  Shoulder prepped in sterile fashion utilizing a posterior approach  40 mg of Kenalog and 5 cc of 2% lidocaine injected into the subacromial space  Vapocoolant spray was utilized  Patient tolerated the procedure well  Postprocedure care and red flags discussed

## 2022-03-24 NOTE — PROGRESS NOTES
"CHIEF COMPLAINT:  Alton Childs female presenting at the request of Harjeet Hammonds P.A.-C.  for evaluation of Shoulder pain.     Alton Childs is complaining of left shoulder pain (R handed)  present for 2 months  Insidious without injury. She had been helping her mom with transferring before her mom's death  Pain is at the deltoid region  Quality is aching, sharp  Pain is Non-radiating, mostly in the rotator cuff distribution  Aggravated by movement and overhead activity  Improved with  rest   no prior problems with this shoulder in the past  Prior Treatments: seen by PCP  Prior studies: NO Prior imaging has been done   Medications tried for pain include: none  Mechanical Symptom history: No Locking    Retired  Activities include walking, reading, computer work, stain glass projects    REVIEW OF SYSTEMS  No Nausea, No Vomiting, No Chest Pain, No Shortness of Breath, No Dizziness, Headache    PAST MEDICAL HISTORY:   History reviewed. No pertinent past medical history.    PMH:  has a past medical history of Arthritis, Heart valve disease, HTN (hypertension), benign (4/22/2011), Hyperlipidemia (3/9/2022), Hypertension, Hypothyroid (4/22/2011), and Hypothyroidism (4/22/2011).  MEDS:   Current Outpatient Medications:   •  amoxicillin (AMOXIL) 500 MG Cap, , Disp: , Rfl:   •  amLODIPine (NORVASC) 10 MG Tab, Take 1 Tablet by mouth every day., Disp: 30 Tablet, Rfl: 2  •  penicillin v potassium (VEETID) 500 MG Tab, Take 1 Tablet by mouth 4 times a day., Disp: 28 Tablet, Rfl: 0  •  losartan (COZAAR) 100 MG Tab, Take 1 Tablet by mouth every day., Disp: 30 Tablet, Rfl: 0  •  levothyroxine (SYNTHROID) 100 MCG Tab, TAKE 1 TABLET BY MOUTH EVERY DAY IN THE MORNING ON AN EMPTY STOMACH, Disp: 30 Tablet, Rfl: 0  ALLERGIES:   Allergies   Allergen Reactions   • Penicillins Unspecified     'NASTY\"     SURGHX:   Past Surgical History:   Procedure Laterality Date   • CERVICAL FUSION POSTERIOR  7/18/2012    Performed by DICK, " "LA NENA MICHEL at [facility]   • CERVICAL DECOMPRESSION POSTERIOR  7/18/2012    Performed by LA NENA JENNINGS at [facility]   • CERVICAL FORAMINOTOMY  7/18/2012    Performed by LA NENA JENNINGS at [facility]   • INJ,EPIDURAL,CERV/THOR SINGLE  6/1/2012    Performed by ORLY ORTA at [facility]   • CHOLECYSTECTOMY     • GYN SURGERY      hysterectomy   • OTHER      T&A   • OTHER ORTHOPEDIC SURGERY      kaur bunionectomy     SOCHX:  reports that she quit smoking about 28 years ago. Her smoking use included cigarettes. She has a 24.00 pack-year smoking history. She has never used smokeless tobacco. She reports previous alcohol use. She reports that she does not use drugs.  FH: Family history was reviewed, no pertinent findings to report     PHYSICAL EXAM:  /82 (BP Location: Right arm, Patient Position: Sitting, BP Cuff Size: Adult)   Pulse 84   Temp 36.8 °C (98.2 °F) (Temporal)   Resp 16   Ht 1.626 m (5' 4\")   Wt 77.8 kg (171 lb 9.6 oz)   LMP  (LMP Unknown)   SpO2 97%   BMI 29.46 kg/m²      well-developed, well-nourished in no apparent distress, alert and oriented x 3.  Gait: normal    Cervical spine:  Range of motion Slightly limited with Extension and Slightly limited with Lateral rotation  Spurling's testing is NEGATIVE  Cervical spine tenderness NEGATIVE    Strength testing:     Deltoid, bilateral 5/5  Bicep, bilateral 5/5  Tricep, bilateral 5/5  Wrist Extension, bilateral 5/5  Wrist Flexion, bilateral 5/5  Finger Abduction, bilateral 5/5    Sensation:  INTACT Bilaterally        Reflexes:   Biceps: R 2+/L 1+  Triceps: R 2+/L  2+  Brachial radialis R 2+/L  1+  Kemp's testing is NEGATIVE  The arms are otherwise neurovascularly intact     Shoulder Exam:    RIGHT Shoulder:  No visible swelling   Range of motion INTACT  Tenderness: Non-tender  Empty Can Testing 5/5  Internal Rotation 5/5  External Rotation 5/5  Lift Off Testing 5/5  Impingement testing " Ramires  NEGATIVE  Neer's testing NEGATIVE  Apprehension testing NEGATIVE    LEFT Shoulder:  No visible swelling   Range of motion Range of motion DIMINISHED with pain  Tenderness: Non-tender  Empty Can Testing 5/5 WITH PAIN  Internal Rotation 5/5  External Rotation 5/5  Lift Off Testing 5/5  Impingement testing Ramires  NEGATIVE  Neer's testing NEGATIVE  Apprehension testing POSITIVE    Additional Findings: None    1. Chronic left shoulder pain  DX-SHOULDER 2+ LEFT    triamcinolone acetonide (KENALOG-40) injection 40 mg     present for 2 months  Insidious without injury. She had been helping her mom with transferring before her mom's death  Pain is at the deltoid region    LEFT subacromial corticosteroid injection performed the office TODAY (March 24, 2022)    Return in about 4 weeks (around 4/21/2022).   To see how she is doing after LEFT subacromial corticosteroid injection        3/24/2022 2:52 PM     HISTORY/REASON FOR EXAM:  Atraumatic Pain/Swelling/Deformity        TECHNIQUE/EXAM DESCRIPTION AND NUMBER OF VIEWS:  3 views of the LEFT shoulder.     COMPARISON: None     FINDINGS:  There is no evidence of fracture or dislocation. Glenohumeral and acromioclavicular articulation is maintained. Small calcific density adjacent to the greater tuberosity can be seen in calcific tendinopathy. Visualized left lung field is clear.   Atherosclerotic calcification is seen.        IMPRESSION:     1.  No evidence of acute fracture or dislocation.  2.  Small calcific density adjacent to the greater tuberosity can be seen in calcific tendinopathy.  3.  Atherosclerotic plaque.     taken here and reviewed by me    Thank you Harjeet Hammonds P.A.-C.  For allowing me to participate in care of your patient.

## 2022-04-04 DIAGNOSIS — E03.9 ACQUIRED HYPOTHYROIDISM: ICD-10-CM

## 2022-04-04 DIAGNOSIS — I10 PRIMARY HYPERTENSION: ICD-10-CM

## 2022-04-05 RX ORDER — LEVOTHYROXINE SODIUM 0.1 MG/1
TABLET ORAL
Qty: 90 TABLET | Refills: 3 | Status: SHIPPED | OUTPATIENT
Start: 2022-04-05 | End: 2022-05-05 | Stop reason: SDUPTHER

## 2022-04-05 RX ORDER — AMLODIPINE BESYLATE 5 MG/1
TABLET ORAL
Qty: 90 TABLET | Refills: 3 | Status: SHIPPED | OUTPATIENT
Start: 2022-04-05 | End: 2022-05-07

## 2022-04-05 RX ORDER — LOSARTAN POTASSIUM 100 MG/1
100 TABLET ORAL
Qty: 90 TABLET | Refills: 3 | Status: SHIPPED | OUTPATIENT
Start: 2022-04-05

## 2022-04-07 ENCOUNTER — OFFICE VISIT (OUTPATIENT)
Dept: CARDIOLOGY | Facility: MEDICAL CENTER | Age: 77
End: 2022-04-07
Payer: MEDICARE

## 2022-04-07 VITALS
DIASTOLIC BLOOD PRESSURE: 70 MMHG | HEIGHT: 64 IN | RESPIRATION RATE: 16 BRPM | WEIGHT: 170 LBS | SYSTOLIC BLOOD PRESSURE: 130 MMHG | HEART RATE: 88 BPM | BODY MASS INDEX: 29.02 KG/M2 | OXYGEN SATURATION: 93 %

## 2022-04-07 DIAGNOSIS — R00.2 PALPITATIONS: ICD-10-CM

## 2022-04-07 DIAGNOSIS — I34.1 MITRAL VALVE PROLAPSE: ICD-10-CM

## 2022-04-07 DIAGNOSIS — I10 ESSENTIAL HYPERTENSION: ICD-10-CM

## 2022-04-07 PROCEDURE — 99204 OFFICE O/P NEW MOD 45 MIN: CPT | Performed by: INTERNAL MEDICINE

## 2022-04-07 ASSESSMENT — ENCOUNTER SYMPTOMS
CONSTIPATION: 0
DYSPNEA ON EXERTION: 0
LOSS OF BALANCE: 0
SORE THROAT: 0
DIZZINESS: 0
DIAPHORESIS: 0
MYALGIAS: 0
WHEEZING: 0
BACK PAIN: 0
EXCESSIVE DAYTIME SLEEPINESS: 0
VOMITING: 0
SYNCOPE: 0
PND: 0
WEAKNESS: 0
SLEEP DISTURBANCES DUE TO BREATHING: 0
SHORTNESS OF BREATH: 0
PARESTHESIAS: 0
COUGH: 0
HEADACHES: 0
ORTHOPNEA: 0
NAUSEA: 0
NEAR-SYNCOPE: 0
BLOATING: 0
NIGHT SWEATS: 0
WEIGHT GAIN: 1
DOUBLE VISION: 0
FALLS: 0
PALPITATIONS: 1
DECREASED APPETITE: 0
IRREGULAR HEARTBEAT: 0
LIGHT-HEADEDNESS: 0
FLANK PAIN: 0
BLURRED VISION: 0
NUMBNESS: 0
FEVER: 0
DIARRHEA: 0

## 2022-04-07 NOTE — PROGRESS NOTES
Cardiology Initial Consultation Note    Date of note:    4/7/2022    Primary Care Provider: Ibrahima Anthony M.D.  Referring Provider: Ibrahima Anthony M.D.    Patient Name: Alton Childs   YOB: 1945  MRN:              [id number]    Chief Complaint   Patient presents with   • Mitral Valve Prolapse   • Hypertension     NP Dx:Primary hypertension   • Dyslipidemia       History of Present Illness: Ms. Alton Childs is a 76 y.o. female whose current medical problems include mitral valve prolapse, HTN and hypothyroidism who is here for cardiac consultation for chest pain and mitral valve prolapse.    Patient states that she was diagnosed with mitral valve prolapse in her 20s and was told that she will need her valve replaced in the future.  Was feeling well from a cardiovascular perspective until last year when she started having episodes of palpitations and chest pain.  At the same time, she was solely taking care of her mother who had Lewy body dementia and was quite stressed with the situation.  Has also gained weight since then.  Since her mother passing, stress level has reduced and she has noted improvement in her symptoms.  Continues to have occasional episodes of palpitations and chest pain but improved from before.    Cardiovascular Risk Factors:  1. Smoking status: Former smoker, quit 1993  2. Type II Diabetes Mellitus: Prediabetes  Lab Results   Component Value Date/Time    HBA1C 5.8 (H) 02/27/2020 09:10 AM     3. Hypertension: Yes  4. Dyslipidemia:    Cholesterol,Tot   Date Value Ref Range Status   05/18/2017 223 (H) 100 - 199 mg/dL Final     LDL   Date Value Ref Range Status   05/18/2017 128 (H) <100 mg/dL Final     HDL   Date Value Ref Range Status   05/18/2017 69 >=40 mg/dL Final     Triglycerides   Date Value Ref Range Status   05/18/2017 132 0 - 149 mg/dL Final     5. Family history of early Coronary Artery Disease in a first degree relative (Male less than 55 years of age;  Female less than 65 years of age): Denies  6.  Obesity and/or Metabolic Syndrome: BMI 29  7. Sedentary lifestyle: No    Review of Systems   Constitutional: Positive for weight gain. Negative for decreased appetite, diaphoresis, fever, malaise/fatigue and night sweats.   HENT: Negative for congestion and sore throat.    Eyes: Negative for blurred vision and double vision.   Cardiovascular: Positive for chest pain and palpitations. Negative for cyanosis, dyspnea on exertion, irregular heartbeat, leg swelling, near-syncope, orthopnea, paroxysmal nocturnal dyspnea and syncope.   Respiratory: Negative for cough, shortness of breath, sleep disturbances due to breathing and wheezing.    Endocrine: Negative for cold intolerance and heat intolerance.   Musculoskeletal: Negative for back pain, falls and myalgias.   Gastrointestinal: Negative for bloating, constipation, diarrhea, nausea and vomiting.   Genitourinary: Negative for dysuria and flank pain.   Neurological: Negative for excessive daytime sleepiness, dizziness, headaches, light-headedness, loss of balance, numbness, paresthesias and weakness.         Past Medical History:   Diagnosis Date   • Arthritis    • Heart valve disease    • HTN (hypertension), benign 4/22/2011   • Hyperlipidemia 3/9/2022   • Hypertension    • Hypothyroid 4/22/2011   • Hypothyroidism 4/22/2011         Past Surgical History:   Procedure Laterality Date   • CERVICAL FUSION POSTERIOR  7/18/2012    Performed by LA NENA JENNINGS at SURGERY [facility] ORS   • CERVICAL DECOMPRESSION POSTERIOR  7/18/2012    Performed by LA NENA JENNINGS at SURGERY [facility] ORS   • CERVICAL FORAMINOTOMY  7/18/2012    Performed by LA NENA JENNINGS at [facility] ORS   • INJ,EPIDURAL,CERV/THOR SINGLE  6/1/2012    Performed by ORLY ORTA at SURGERY [facility] ORS   • CHOLECYSTECTOMY     • GYN SURGERY      hysterectomy   • OTHER      T&A   • OTHER ORTHOPEDIC SURGERY      kaur bunionectomy         Current  "Outpatient Medications   Medication Sig Dispense Refill   • losartan (COZAAR) 100 MG Tab Take 1 Tablet by mouth every day. 90 Tablet 3   • levothyroxine (SYNTHROID) 100 MCG Tab TAKE 1 TABLET BY MOUTH EVERY DAY IN THE MORNING ON AN EMPTY STOMACH 90 Tablet 3   • amLODIPine (NORVASC) 5 MG Tab TAKE 1 TABLET BY MOUTH EVERY DAY 90 Tablet 3   • amoxicillin (AMOXIL) 500 MG Cap      • penicillin v potassium (VEETID) 500 MG Tab Take 1 Tablet by mouth 4 times a day. (Patient not taking: Reported on 2022) 28 Tablet 0     No current facility-administered medications for this visit.         Allergies   Allergen Reactions   • Penicillins Unspecified     'NASTY\"         Family History   Problem Relation Age of Onset   • Hypertension Father    • Heart Disease Father    • Cancer Mother         breast   • Genetic Disorder Paternal Grandmother          Social History     Socioeconomic History   • Marital status:      Spouse name: Not on file   • Number of children: Not on file   • Years of education: Not on file   • Highest education level: Associate degree: academic program   Occupational History   • Not on file   Tobacco Use   • Smoking status: Former Smoker     Packs/day: 0.75     Years: 32.00     Pack years: 24.00     Types: Cigarettes     Quit date: 1993     Years since quittin.7   • Smokeless tobacco: Never Used   • Tobacco comment: started at 16   Vaping Use   • Vaping Use: Never used   Substance and Sexual Activity   • Alcohol use: Not Currently     Comment: once a year   • Drug use: No   • Sexual activity: Never     Partners: Male   Other Topics Concern   • Not on file   Social History Narrative   • Not on file     Social Determinants of Health     Financial Resource Strain: Low Risk    • Difficulty of Paying Living Expenses: Not hard at all   Food Insecurity: No Food Insecurity   • Worried About Running Out of Food in the Last Year: Never true   • Ran Out of Food in the Last Year: Never true " "  Transportation Needs: No Transportation Needs   • Lack of Transportation (Medical): No   • Lack of Transportation (Non-Medical): No   Physical Activity: Insufficiently Active   • Days of Exercise per Week: 3 days   • Minutes of Exercise per Session: 30 min   Stress: Stress Concern Present   • Feeling of Stress : To some extent   Social Connections: Moderately Integrated   • Frequency of Communication with Friends and Family: More than three times a week   • Frequency of Social Gatherings with Friends and Family: More than three times a week   • Attends [employer] Services: More than 4 times per year   • Active Member of Clubs or Organizations: Yes   • Attends Club or Organization Meetings: More than 4 times per year   • Marital Status:    Intimate Partner Violence: Not on file   Housing Stability: High Risk   • Unable to Pay for Housing in the Last Year: No   • Number of Places Lived in the Last Year: 3   • Unstable Housing in the Last Year: No         Physical Exam:  Ambulatory Vitals  /70 (BP Location: Left arm, Patient Position: Sitting, BP Cuff Size: Adult)   Pulse 88   Resp 16   Ht 1.626 m (5' 4\")   Wt 77.1 kg (170 lb)   SpO2 93%    Oxygen Therapy:  Pulse Oximetry: 93 %  BP Readings from Last 4 Encounters:   04/07/22 130/70   03/24/22 126/82   03/18/22 148/76   03/14/22 (!) 170/84       Weight/BMI: Body mass index is 29.18 kg/m².  Wt Readings from Last 4 Encounters:   04/07/22 77.1 kg (170 lb)   03/24/22 77.8 kg (171 lb 9.6 oz)   03/18/22 77.8 kg (171 lb 9.6 oz)   03/10/22 78.4 kg (172 lb 12.8 oz)       General: No acute distress. Well nourished.  HEENT: EOM grossly intact, no scleral icterus, no pharyngeal erythema.   Neck:  Trachea midline, no visible masses  CVS: RRR. No JVD at 90  Resp: Normal respiratory effort. Normal appearing chest.  Abdomen: Not overtly obese.  MSK/Ext: No clubbing or cyanosis.  Skin: Dry appearing, no rashes.  Neurological: CN III-XII grossly intact. No focal " deficits.   Psych: A&O x 3, appropriate affect, good judgement      Lab Data Review:  Lab Results   Component Value Date/Time    CHOLSTRLTOT 223 (H) 05/18/2017 06:10 AM     (H) 05/18/2017 06:10 AM    HDL 69 05/18/2017 06:10 AM    TRIGLYCERIDE 132 05/18/2017 06:10 AM       Lab Results   Component Value Date/Time    SODIUM 136 03/02/2022 07:56 PM    POTASSIUM 3.8 03/02/2022 07:56 PM    CHLORIDE 99 03/02/2022 07:56 PM    CO2 25 03/02/2022 07:56 PM    GLUCOSE 108 (H) 03/02/2022 07:56 PM    BUN 25 (H) 03/02/2022 07:56 PM    CREATININE 0.83 03/02/2022 07:56 PM    CREATININE 0.90 05/02/2011 12:30 PM    BUNCREATRAT 21 05/02/2011 12:30 PM     Lab Results   Component Value Date/Time    ALKPHOSPHAT 125 (H) 03/06/2019 08:26 PM    ASTSGOT 46 (H) 03/06/2019 08:26 PM    ALTSGPT 46 03/06/2019 08:26 PM    TBILIRUBIN 0.6 03/06/2019 08:26 PM      Lab Results   Component Value Date/Time    WBC 14.9 (H) 03/02/2022 07:56 PM     Lab Results   Component Value Date/Time    HBA1C 5.8 (H) 02/27/2020 09:10 AM         Cardiac Imaging and Procedures Review:    EKG dated 3/2/2022: My personal interpretation is sinus bradycardia    Nuclear Perfusion Imaging (5/22/2017):    NUCLEAR IMAGING INTERPRETATION   No evidence of significant jeopardized viable myocardium or prior myocardial    infarction.   Normal left ventricular size, ejection fraction, and wall motion.   ECG INTERPRETATION   Negative stress ECG for ischemia.        Assessment & Plan     1. Essential hypertension     2. Mitral valve prolapse  EC-ECHOCARDIOGRAM COMPLETE W/O CONT   3. Palpitations  EC-ECHOCARDIOGRAM COMPLETE W/O CONT         Shared Medical Decision Making:  Obtain transthoracic echocardiogram given history of mitral valve prolapse and ongoing episodes of palpitations.  Rule out structural or valvular heart abnormality.    Blood pressure under excellent control.  Continue losartan 100 mg and amlodipine 5 mg daily.    Patient has gained weight but is now actively  trying to lose it.  Started weight watchers and has been successful in losing some weight.  Discussed doing cardio based exercise with focus on low-fat/low-cholesterol diet which she voices understanding.      All of patient's excellent questions were answered to the best of my knowledge and to her satisfaction.  It was a pleasure seeing Ms. Alton Childs in my clinic today. RTC if abnormal test results otherwise as needed. Patient is aware to call the cardiology clinic with any questions or concerns.      Amauri Wesley MD  [facility] Heart and Vascular [facility] Medicine, [facility].  [address] [address]  Phone: [phone]  Fax: [phone]    Please note that this dictation was created using voice recognition software. I have made every reasonable attempt to correct obvious errors, but it is possible there are errors of grammar and possibly content that I did not discover before finalizing the note.

## 2022-04-08 ENCOUNTER — OFFICE VISIT (OUTPATIENT)
Dept: NEUROLOGY | Facility: MEDICAL CENTER | Age: 77
End: 2022-04-08
Attending: PSYCHIATRY & NEUROLOGY
Payer: MEDICARE

## 2022-04-08 VITALS
OXYGEN SATURATION: 93 % | SYSTOLIC BLOOD PRESSURE: 144 MMHG | BODY MASS INDEX: 29.24 KG/M2 | WEIGHT: 171.3 LBS | RESPIRATION RATE: 14 BRPM | DIASTOLIC BLOOD PRESSURE: 64 MMHG | HEIGHT: 64 IN | HEART RATE: 66 BPM | TEMPERATURE: 98.4 F

## 2022-04-08 DIAGNOSIS — R51.9 HEADACHE IN BACK OF HEAD: ICD-10-CM

## 2022-04-08 DIAGNOSIS — I10 PRIMARY HYPERTENSION: ICD-10-CM

## 2022-04-08 DIAGNOSIS — F43.9 SITUATIONAL STRESS: ICD-10-CM

## 2022-04-08 PROCEDURE — 99211 OFF/OP EST MAY X REQ PHY/QHP: CPT | Performed by: PSYCHIATRY & NEUROLOGY

## 2022-04-08 PROCEDURE — 99203 OFFICE O/P NEW LOW 30 MIN: CPT | Performed by: PSYCHIATRY & NEUROLOGY

## 2022-04-08 NOTE — PROGRESS NOTES
"Reason for Consult:  Headache(s).    History of present illness:    Alton Ibrahimangela 76 y.o.right handed woman who lives for many years in [facility] for many years and in [facility] x 23 years ago. Recently . She was working in [employer] for many years and retired around the beginning of COVID.    Alton relates her concern to her mother have Lewy Body Dementia and recently dying for which Alton was the primary caregiver and Alton return back home in December 2021. She described having a stressful period and watched her literally die.    She noticed about 1 month or so after return to her home and she noticed to have a very discrete headache in the right parietal area in a quarter sized area which hurt to touch in (felt bruised in that area) and came on insidiously and lasted 1 full week. She noticed that whenever she took her classes off (and the rim was not touching her head) that due to the lack of pressure and within 1-2 hours the \"constant deep ache\" slow dissipated and aspirin (2 per day) helped.  The area of localized pain was exact over or in the area where the right rim touched her scalp. She had these glasses for 3 years prior. The pain was periodic and went away after 1 week. Her blood pressure was elevated into the 190 range. She described numerous stress factors including dealing the IRS, Attorneys and finances. She self relates her subjective stress level as 9-10/10 during a 2 week period and including the time she the above localized pain as mentioned above. Her stress level is to her non existent this time.    She recalls feeling very depressed after returning home from her mother's illness. In retrospect, she did not want to do anything and did not want to go outside but did  Her grocery shopping and is now going out and goes out with her girlfriend (to a gym) and signing up for weight watchers and coloring in a book and learning a new computer platform.    She \"put " "everything in her life on the back burner\" when taking of her mother for 24 hours at a time. She the sole caregiver and take care of her mother's food,shopping,finances and this was a sustained caregiver role for over 1  Year.     Average sleep- 8-9 hours of sleep most nights in the recent months; occasionally gets up once at midnight and gets a warm glass of honey and milk and gets right back to sleep. No REM Sleep Behavioral symptoms noticed.    Constantly reads book- history and romantic especially and most days without difficulties and watches very little TV.    Absolutely denies any problems with cognition,memory,communication abilities, ADL(s) including driving in the last 6-12 months which we reviewed today.    Gait-balance have been good in the recent months.    No history of thunderclap or postural/valsalva associated headache(s) in the recent years.  No history to suggest migraine type headache(s).    ROS: In her 20's she had on and off similar pain in the same area (lasting 3-5 second)- with a low level bruise feeling.and she recalls getting this pain about 3-4 times a month. This specific pain was never lasting minutes to hours or even a day that she recalls. It continues in the same location for many,many years and had been gone for 2-3 years until when got the week long pain as mentioned above.        Patient Active Problem List    Diagnosis Date Noted   • Dehydration 03/10/2022   • Prediabetes 03/10/2022   • Generalized headaches 03/10/2022   • Leukocytosis 03/09/2022   • Mitral valve prolapse 03/09/2022   • Vertigo 03/09/2022   • Chronic left shoulder pain 03/09/2022   • Environmental and seasonal allergies 04/01/2019   • Family history of breast cancer 04/21/2016   • Tinnitus 06/09/2015   • Acquired hypothyroidism 06/02/2015   • Dyslipidemia 10/25/2012   • DJD (degenerative joint disease) of cervical spine 07/18/2012   • Primary hypertension 04/22/2011       Past medical history:   Past Medical " History:   Diagnosis Date   • Arthritis    • Heart valve disease    • HTN (hypertension), benign 2011   • Hyperlipidemia 3/9/2022   • Hypertension    • Hypothyroid 2011   • Hypothyroidism 2011       Past surgical history:   Past Surgical History:   Procedure Laterality Date   • CERVICAL FUSION POSTERIOR  2012    Performed by LA NENA JENNINGS at SURGERY [facility] ORS   • CERVICAL DECOMPRESSION POSTERIOR  2012    Performed by LA NENA JENNINGS at SURGERY [facility] ORS   • CERVICAL FORAMINOTOMY  2012    Performed by LA NENA JENNINGS at SURGERY [facility] ORS   • INJ,EPIDURAL,CERV/THOR SINGLE  2012    Performed by ORLY ORTA at [facility]   • CHOLECYSTECTOMY     • GYN SURGERY      hysterectomy   • OTHER      T&A   • OTHER ORTHOPEDIC SURGERY      kaur bunionectomy         Social history:   Social History     Socioeconomic History   • Marital status:      Spouse name: Not on file   • Number of children: Not on file   • Years of education: Not on file   • Highest education level: Associate degree: academic program   Occupational History   • Not on file   Tobacco Use   • Smoking status: Former Smoker     Packs/day: 0.75     Years: 32.00     Pack years: 24.00     Types: Cigarettes     Quit date:      Years since quittin.2   • Smokeless tobacco: Never Used   • Tobacco comment: started at 16   Vaping Use   • Vaping Use: Never used   Substance and Sexual Activity   • Alcohol use: Not Currently     Comment: once a year   • Drug use: No   • Sexual activity: Never     Partners: Male   Other Topics Concern   • Not on file   Social History Narrative   • Not on file     Social Determinants of Health     Financial Resource Strain: Low Risk    • Difficulty of Paying Living Expenses: Not hard at all   Food Insecurity: No Food Insecurity   • Worried About Running Out of Food in the Last Year: Never true   • Ran Out of Food in the Last Year: Never true   Transportation  "Needs: No Transportation Needs   • Lack of Transportation (Medical): No   • Lack of Transportation (Non-Medical): No   Physical Activity: Insufficiently Active   • Days of Exercise per Week: 3 days   • Minutes of Exercise per Session: 30 min   Stress: Stress Concern Present   • Feeling of Stress : To some extent   Social Connections: Moderately Integrated   • Frequency of Communication with Friends and Family: More than three times a week   • Frequency of Social Gatherings with Friends and Family: More than three times a week   • Attends [employer] Services: More than 4 times per year   • Active Member of Clubs or Organizations: Yes   • Attends Club or Organization Meetings: More than 4 times per year   • Marital Status:    Intimate Partner Violence: Not on file   Housing Stability: High Risk   • Unable to Pay for Housing in the Last Year: No   • Number of Places Lived in the Last Year: 3   • Unstable Housing in the Last Year: No       Family history:   Family History   Problem Relation Age of Onset   • Hypertension Father    • Heart Disease Father    • Cancer Mother         breast   • Genetic Disorder Paternal Grandmother          Current medications:   Current Outpatient Medications   Medication   • losartan (COZAAR) 100 MG Tab   • levothyroxine (SYNTHROID) 100 MCG Tab   • amLODIPine (NORVASC) 5 MG Tab   • amoxicillin (AMOXIL) 500 MG Cap     No current facility-administered medications for this visit.       Medication Allergy:  Allergies   Allergen Reactions   • Penicillins Unspecified     'NASTY\"           Physical examination:   Vitals:    04/08/22 0801   BP: 144/64   BP Location: Right arm   Patient Position: Sitting   BP Cuff Size: Adult   Pulse: 66   Resp: 14   Temp: 36.9 °C (98.4 °F)   TempSrc: Temporal   SpO2: 93%   Weight: 77.7 kg (171 lb 4.8 oz)   Height: 1.626 m (5' 4\")       Normal cephalic atraumatic.   Full range of movement around the Neck in all directions without restrictions or discrete " pain evoked triggers.  No soreness or tenderness to palpation of muscles of the posterior neck areas or in prior area of localized pain about 1 to 1.5 inches behind the right ear.        Neurological  Exam:    Mental status: Awake, alert and fully oriented to person, place, time and situation. Normal attention, concentration and fund of knowledge for education level.  Did not appear/act combative,irritable,anxious,paranoid/delusional or aggressive to or with me.  Speech and language: Speech is fluent without errors, clear, intact to repetition and intact to naming.     Follows 3 step motor commands in sequence without significant delay and correctly.    Recalls 5 words easily at 3 and 5 minutes > face,velvet,Quaker,emilia,red.      Cranial nerve exam:  II: Pupils are equally round and reactive to light. Visual fields are intact by confrontation.  III, IV, VI: EOMI, no diplopia, no ptosis.  Pupils 4>3 mm bilaterally.    V: Sensation to light touch is normal over V1-3 distributions bilaterally.  .  VII: Facial movements are symmetrical. There is no facial droop. .  VIII: Hearing intact to soft speech and finger rub bilaterally  IX: Palate elevates symmetrically, uvula is midline. Dysarthria is not present.  XI: Shoulder shrug are symmetrical and strong.   XII: Tongue protrudes midline.        Motor exam:  Muscle tone is normal in all 4 limbs. and No abnormal movements appreciated.    Muscle strength:    Neck Flexors/Extensors: 5/5       Right  Left  Deltoid   5/5  5/5      Biceps   5/5  5/5  Triceps  5/5  5/5   Wrist extensors 5/5  5/5  Wrist flexors  5/5  5/5     5/5  5/5  Interossei  5/5  5/5  Thenar (APB)  5/5  5/5   Hip flexors  5/5  5/5  Quadriceps  5/5  5/5    Hamstrings  5/5  5/5  Dorsiflexors  5/5  5/5  Plantarflexors  5/5  5/5  Toe extension  5/5  5/5  Toe Flexors                5/5                   5/5      Reflexes:       Right  Left  Biceps   2/4  2/4  Triceps  2/4  2/4  Brachioradialis 2/4  2/4  Knee  jerk  2/4  2/4  Ankle jerk  2/4  2/4     Frontal release signs are normal.    bilaterally toes are downgoing to plantar stimulation..    Coordination (finger-to-nose, heel/knee/shin, rapid alternating movements) was normal.     There was no truncal ataxia, no tremors, and no dysmetria.     Station and gait - Easily stands up from exam chair without retropulsion,veering,leaning,swaying (to either side).   Arm swing symmetrical.    No Rombergism.      Labs and Tests:     NEUROIMAGING:           Impression/Plans/Recommendations:    1. Situational Stress- high level of which was well endorsed today in our discussion. She is now compensating very well and back to doing many activities and has a very low stress level (self rated at 0-1/10 in the last few months).    2. Headache(s)- very localized over the area posterior to the right mastoid bone behind the right year but without any radiation down or into the neck proper or into the ear. No features to suggest occipital neuralgia and the pain was described sore when palpated at the time it was present for a good week and has now resolved. I do not feel that a Cervical MRI or Brain MRI is needed at this time.    There were no features to suggest temporal arteritis either such as PMR symptoms, jaw claudication or visual disturbances.    3. There are no features of ischemic brain symptomatology related to the above headache or during that period of time.    4. There is no evidence for any significant cognitive impairment at this time.    5. HTN- mildly elevated today; she is aware to check her BP at home.        I have performed  a history and physical exam and a directed /focused  ROS today.    Total time spent today or this patient's care was 34  minutes   and included reviewing diagnostic workup to date (labs and imaging that include interpreting such tests relevant to this patient's neurological condition) and  giving advise and suggestions on the present neurological  problem and  documenting the clinical information in the EMR.    Follow up PRN at this time.    Adonay Cobos MD  [facility].   [facility]

## 2022-04-12 ENCOUNTER — APPOINTMENT (OUTPATIENT)
Dept: MEDICAL GROUP | Facility: PHYSICIAN GROUP | Age: 77
End: 2022-04-12
Payer: MEDICARE

## 2022-04-21 ENCOUNTER — OFFICE VISIT (OUTPATIENT)
Dept: MEDICAL GROUP | Facility: PHYSICIAN GROUP | Age: 77
End: 2022-04-21
Payer: MEDICARE

## 2022-04-21 ENCOUNTER — OFFICE VISIT (OUTPATIENT)
Dept: SPORTS MEDICINE | Facility: CLINIC | Age: 77
End: 2022-04-21
Payer: MEDICARE

## 2022-04-21 VITALS
TEMPERATURE: 98 F | SYSTOLIC BLOOD PRESSURE: 118 MMHG | BODY MASS INDEX: 29.24 KG/M2 | RESPIRATION RATE: 16 BRPM | DIASTOLIC BLOOD PRESSURE: 78 MMHG | HEIGHT: 64 IN | OXYGEN SATURATION: 92 % | WEIGHT: 171.3 LBS | HEART RATE: 80 BPM

## 2022-04-21 VITALS
TEMPERATURE: 97.8 F | RESPIRATION RATE: 16 BRPM | OXYGEN SATURATION: 96 % | SYSTOLIC BLOOD PRESSURE: 136 MMHG | WEIGHT: 171.6 LBS | HEART RATE: 79 BPM | HEIGHT: 64 IN | DIASTOLIC BLOOD PRESSURE: 66 MMHG | BODY MASS INDEX: 29.3 KG/M2

## 2022-04-21 DIAGNOSIS — G89.29 CHRONIC LEFT SHOULDER PAIN: ICD-10-CM

## 2022-04-21 DIAGNOSIS — M25.512 CHRONIC LEFT SHOULDER PAIN: ICD-10-CM

## 2022-04-21 DIAGNOSIS — I10 PRIMARY HYPERTENSION: ICD-10-CM

## 2022-04-21 DIAGNOSIS — R73.03 PREDIABETES: ICD-10-CM

## 2022-04-21 DIAGNOSIS — M75.82 ROTATOR CUFF TENDINITIS, LEFT: ICD-10-CM

## 2022-04-21 DIAGNOSIS — R51.9 GENERALIZED HEADACHES: ICD-10-CM

## 2022-04-21 DIAGNOSIS — I34.1 MITRAL VALVE PROLAPSE: ICD-10-CM

## 2022-04-21 PROCEDURE — 99214 OFFICE O/P EST MOD 30 MIN: CPT | Performed by: FAMILY MEDICINE

## 2022-04-21 PROCEDURE — 99213 OFFICE O/P EST LOW 20 MIN: CPT | Performed by: FAMILY MEDICINE

## 2022-04-21 RX ORDER — BACLOFEN 10 MG/1
10 TABLET ORAL 3 TIMES DAILY
Qty: 40 TABLET | Refills: 0 | Status: SHIPPED | OUTPATIENT
Start: 2022-04-21 | End: 2022-05-07

## 2022-04-21 ASSESSMENT — ENCOUNTER SYMPTOMS
FEVER: 0
SORE THROAT: 0
COUGH: 0
NAUSEA: 0
CHILLS: 0
PALPITATIONS: 0
DOUBLE VISION: 0
SHORTNESS OF BREATH: 0
MYALGIAS: 0
VOMITING: 0
BLURRED VISION: 0
DIZZINESS: 1
HEADACHES: 0

## 2022-04-21 NOTE — PROGRESS NOTES
"CHIEF COMPLAINT:  Alton Childs female presenting at the request of Harjeet Hammonds P.A.-C.  for evaluation of Shoulder pain.     Alton Childs is complaining of left shoulder pain (R handed)  present since late January 2021  Insidious without injury. She had been helping her mom with transferring before her mom's death  Pain is at the deltoid region  Quality is aching, sharp  Pain is Non-radiating, mostly in the rotator cuff distribution  Aggravated by movement and overhead activity  Improved with  rest   no prior problems with this shoulder in the past  Prior Treatments: seen by PCP  Prior studies: NO Prior imaging has been done   Medications tried for pain include: none  Mechanical Symptom history: No Locking    Retired  Activities include walking, reading, computer work, stain glass projects     PHYSICAL EXAM:  /78 (BP Location: Right arm, Patient Position: Sitting, BP Cuff Size: Adult)   Pulse 80   Temp 36.7 °C (98 °F) (Temporal)   Resp 16   Ht 1.626 m (5' 4\")   Wt 77.7 kg (171 lb 4.8 oz)   LMP  (LMP Unknown)   SpO2 92%   BMI 29.40 kg/m²      well-developed, well-nourished in no apparent distress, alert and oriented x 3.  Gait: normal    Cervical spine:  Range of motion Slightly limited with Extension and Slightly limited with Lateral rotation    Shoulder Exam:    RIGHT Shoulder:  No visible swelling   Range of motion INTACT  Tenderness: Non-tender  Empty Can Testing 5/5  Internal Rotation 5/5  External Rotation 5/5  Lift Off Testing 5/5  Impingement testing Ramires  NEGATIVE  Neer's testing NEGATIVE  Apprehension testing NEGATIVE    LEFT Shoulder:  No visible swelling   Range of motion Range of motion mildly DIMINISHED with pain  Tenderness: Non-tender  Empty Can Testing 5/5 WITH PAIN  Internal Rotation 5/5  External Rotation 5/5  Lift Off Testing 5/5  Impingement testing Ramires  NEGATIVE  Neer's testing NEGATIVE  Apprehension testing POSITIVE    Additional Findings: None    1. Rotator " cuff tendinitis, left  Referral to Physical Therapy    baclofen (LIORESAL) 10 MG Tab     present for 2 months  Insidious without injury. She had been helping her mom with transferring before her mom's death  Pain is at the deltoid region    LEFT subacromial corticosteroid injection performed (March 24, 2022) helped some    Referral for formal physical therapy ([place])  Home exercise program provided    To see how she is doing with home exercise program and after 1 month of physical therapy        3/24/2022 2:52 PM     HISTORY/REASON FOR EXAM:  Atraumatic Pain/Swelling/Deformity        TECHNIQUE/EXAM DESCRIPTION AND NUMBER OF VIEWS:  3 views of the LEFT shoulder.     COMPARISON: None     FINDINGS:  There is no evidence of fracture or dislocation. Glenohumeral and acromioclavicular articulation is maintained. Small calcific density adjacent to the greater tuberosity can be seen in calcific tendinopathy. Visualized left lung field is clear.   Atherosclerotic calcification is seen.        IMPRESSION:     1.  No evidence of acute fracture or dislocation.  2.  Small calcific density adjacent to the greater tuberosity can be seen in calcific tendinopathy.  3.  Atherosclerotic plaque.     Thank you Harjeet Hammonds P.A.-C.  For allowing me to participate in care of your patient.

## 2022-04-21 NOTE — ASSESSMENT & PLAN NOTE
Chronic stable  Cont to monitor  If elevated inc amlodipine to 10mg  rtc if concerns  ER if red flags

## 2022-04-21 NOTE — ASSESSMENT & PLAN NOTE
Chronic,  Improved  States they got better when bp was more controlled   Last CT head 2017 wnl  Aware of red flags for ER eval  Cont strong bp control

## 2022-05-05 DIAGNOSIS — E03.9 ACQUIRED HYPOTHYROIDISM: ICD-10-CM

## 2022-05-05 RX ORDER — LEVOTHYROXINE SODIUM 0.1 MG/1
TABLET ORAL
Qty: 90 TABLET | Refills: 3 | Status: SHIPPED | OUTPATIENT
Start: 2022-05-05 | End: 2022-05-07

## 2022-05-07 ENCOUNTER — APPOINTMENT (OUTPATIENT)
Dept: RADIOLOGY | Facility: MEDICAL CENTER | Age: 77
End: 2022-05-07
Payer: MEDICARE

## 2022-05-07 ENCOUNTER — HOSPITAL ENCOUNTER (EMERGENCY)
Facility: MEDICAL CENTER | Age: 77
End: 2022-05-07
Attending: EMERGENCY MEDICINE
Payer: MEDICARE

## 2022-05-07 ENCOUNTER — APPOINTMENT (OUTPATIENT)
Dept: RADIOLOGY | Facility: MEDICAL CENTER | Age: 77
End: 2022-05-07
Attending: EMERGENCY MEDICINE
Payer: MEDICARE

## 2022-05-07 VITALS
TEMPERATURE: 97.3 F | BODY MASS INDEX: 28.34 KG/M2 | RESPIRATION RATE: 18 BRPM | HEIGHT: 64 IN | OXYGEN SATURATION: 95 % | HEART RATE: 61 BPM | SYSTOLIC BLOOD PRESSURE: 184 MMHG | WEIGHT: 166 LBS | DIASTOLIC BLOOD PRESSURE: 85 MMHG

## 2022-05-07 DIAGNOSIS — R07.9 CHEST PAIN, UNSPECIFIED TYPE: ICD-10-CM

## 2022-05-07 LAB
ALBUMIN SERPL BCP-MCNC: 4.4 G/DL (ref 3.2–4.9)
ALBUMIN/GLOB SERPL: 1.3 G/DL
ALP SERPL-CCNC: 163 U/L (ref 30–99)
ALT SERPL-CCNC: 14 U/L (ref 2–50)
ANION GAP SERPL CALC-SCNC: 12 MMOL/L (ref 7–16)
AST SERPL-CCNC: 14 U/L (ref 12–45)
BASOPHILS # BLD AUTO: 0.3 % (ref 0–1.8)
BASOPHILS # BLD: 0.04 K/UL (ref 0–0.12)
BILIRUB SERPL-MCNC: 0.7 MG/DL (ref 0.1–1.5)
BUN SERPL-MCNC: 14 MG/DL (ref 8–22)
CALCIUM SERPL-MCNC: 10.1 MG/DL (ref 8.4–10.2)
CHLORIDE SERPL-SCNC: 101 MMOL/L (ref 96–112)
CO2 SERPL-SCNC: 24 MMOL/L (ref 20–33)
CREAT SERPL-MCNC: 0.81 MG/DL (ref 0.5–1.4)
EKG IMPRESSION: NORMAL
EOSINOPHIL # BLD AUTO: 0.03 K/UL (ref 0–0.51)
EOSINOPHIL NFR BLD: 0.2 % (ref 0–6.9)
ERYTHROCYTE [DISTWIDTH] IN BLOOD BY AUTOMATED COUNT: 43.6 FL (ref 35.9–50)
GFR SERPLBLD CREATININE-BSD FMLA CKD-EPI: 75 ML/MIN/1.73 M 2
GLOBULIN SER CALC-MCNC: 3.5 G/DL (ref 1.9–3.5)
GLUCOSE SERPL-MCNC: 107 MG/DL (ref 65–99)
HCT VFR BLD AUTO: 47.5 % (ref 37–47)
HGB BLD-MCNC: 16 G/DL (ref 12–16)
IMM GRANULOCYTES # BLD AUTO: 0.08 K/UL (ref 0–0.11)
IMM GRANULOCYTES NFR BLD AUTO: 0.6 % (ref 0–0.9)
LYMPHOCYTES # BLD AUTO: 1.79 K/UL (ref 1–4.8)
LYMPHOCYTES NFR BLD: 12.7 % (ref 22–41)
MCH RBC QN AUTO: 31.7 PG (ref 27–33)
MCHC RBC AUTO-ENTMCNC: 33.7 G/DL (ref 33.6–35)
MCV RBC AUTO: 94.1 FL (ref 81.4–97.8)
MONOCYTES # BLD AUTO: 0.68 K/UL (ref 0–0.85)
MONOCYTES NFR BLD AUTO: 4.8 % (ref 0–13.4)
NEUTROPHILS # BLD AUTO: 11.53 K/UL (ref 2–7.15)
NEUTROPHILS NFR BLD: 81.4 % (ref 44–72)
NRBC # BLD AUTO: 0 K/UL
NRBC BLD-RTO: 0 /100 WBC
PLATELET # BLD AUTO: 363 K/UL (ref 164–446)
PMV BLD AUTO: 9.3 FL (ref 9–12.9)
POTASSIUM SERPL-SCNC: 4.6 MMOL/L (ref 3.6–5.5)
PROT SERPL-MCNC: 7.9 G/DL (ref 6–8.2)
RBC # BLD AUTO: 5.05 M/UL (ref 4.2–5.4)
SODIUM SERPL-SCNC: 137 MMOL/L (ref 135–145)
TROPONIN T SERPL-MCNC: <6 NG/L (ref 6–19)
TROPONIN T SERPL-MCNC: <6 NG/L (ref 6–19)
WBC # BLD AUTO: 14.2 K/UL (ref 4.8–10.8)

## 2022-05-07 PROCEDURE — 700117 HCHG RX CONTRAST REV CODE 255: Performed by: EMERGENCY MEDICINE

## 2022-05-07 PROCEDURE — 99285 EMERGENCY DEPT VISIT HI MDM: CPT

## 2022-05-07 PROCEDURE — 71275 CT ANGIOGRAPHY CHEST: CPT | Mod: MG

## 2022-05-07 PROCEDURE — 36415 COLL VENOUS BLD VENIPUNCTURE: CPT

## 2022-05-07 PROCEDURE — 71045 X-RAY EXAM CHEST 1 VIEW: CPT

## 2022-05-07 PROCEDURE — 93005 ELECTROCARDIOGRAM TRACING: CPT | Performed by: EMERGENCY MEDICINE

## 2022-05-07 PROCEDURE — 80053 COMPREHEN METABOLIC PANEL: CPT

## 2022-05-07 PROCEDURE — 84484 ASSAY OF TROPONIN QUANT: CPT

## 2022-05-07 PROCEDURE — 85025 COMPLETE CBC W/AUTO DIFF WBC: CPT

## 2022-05-07 PROCEDURE — 93005 ELECTROCARDIOGRAM TRACING: CPT

## 2022-05-07 RX ORDER — MULTIVIT WITH MINERALS/LUTEIN
3000 TABLET ORAL EVERY EVENING
COMMUNITY

## 2022-05-07 RX ORDER — LEVOTHYROXINE SODIUM 0.1 MG/1
100 TABLET ORAL
COMMUNITY

## 2022-05-07 RX ORDER — CHLORHEXIDINE GLUCONATE ORAL RINSE 1.2 MG/ML
15 SOLUTION DENTAL 2 TIMES DAILY
COMMUNITY
Start: 2022-04-28

## 2022-05-07 RX ORDER — AMLODIPINE BESYLATE 5 MG/1
5 TABLET ORAL DAILY
Status: SHIPPED | COMMUNITY
End: 2022-05-26

## 2022-05-07 RX ADMIN — IOHEXOL 52 ML: 350 INJECTION, SOLUTION INTRAVENOUS at 14:20

## 2022-05-07 NOTE — ED NOTES
Med rec completed per patient at bedside and patient's pharmacy, [facility] on [place] ([phone]).  Allergies reviewed with patient.  Patient takes amoxicillin prior to dental procedures. No other outpatient antibiotics within the last 30 days.    [facility] reports that patient also has a prescription for liothyronine 25 mcg (1 tablet every day), however patient states that she only uses Synthroid.  Patient states not using baclofen. Baclofen removed from med rec.

## 2022-05-07 NOTE — ED TRIAGE NOTES
Pt to er with c/o chest pain radiating to back since last night. Has hx of mitral valve prolapse, denies cardiologist. ekg currently in progress-pt states dizzness while in er. bp tr=778/87, bp wc=070/85

## 2022-05-07 NOTE — DISCHARGE INSTRUCTIONS
Return here at once if you feel you are having recurrent new or worsening symptoms.  See your primary care doctor during the week for recheck.  Also, your CT scan showed a nodule in your left adrenal gland which is unchanged compared to the CT scan from 2019 but you should talk to your doctor about this.

## 2022-05-07 NOTE — ED PROVIDER NOTES
ED Provider Note    CHIEF COMPLAINT  Chief Complaint   Patient presents with   • Dizziness     This am   • Chest Pain     Since last evening       HPI  Alton Childs is a 76 y.o. female who presents to the emergency department complaining of chest pain.  The patient says that last night around 1 AM she was not asleep because she was very restless and feeling some anxiety when she developed a midsternal chest pain and some associated nausea and dizziness.  Symptoms have persisted throughout the night and into today and she says the pain shifted from her chest to her back at some point.  At the time of arrival she has 5 out of 10 discomfort but that resolved  while in the ER.  Also the patient reports that she has had some lightheadedness on and off since September which has been attributed to anxiety and she says that she is under a huge amount of stress and has had a lot of anxiety since her mother passed away last September.  The patient thinks that her symptoms during the night may be related to stress.  The patient is generally healthy individual she says that she was found to have mitral valve prolapse at the age of 21 but that is been no problem for her and in 2017 she had a nuclear medicine stress test which she reports was unremarkable and chart review indicates that there were no pathologic findings and associated EKG showed no ischemia at that time.  In addition the patient states she has a chronically elevated white blood cell count.    REVIEW OF SYSTEMS no fever or chills no hemoptysis or shortness of breath, pain does not seem exertional.  All other systems negative    PAST MEDICAL HISTORY  Past Medical History:   Diagnosis Date   • Arthritis    • Heart valve disease    • HTN (hypertension), benign 4/22/2011   • Hyperlipidemia 3/9/2022   • Hypertension    • Hypothyroid 4/22/2011   • Hypothyroidism 4/22/2011       FAMILY HISTORY  Family History   Problem Relation Age of Onset   • Hypertension  Father    • Heart Disease Father    • Cancer Mother         breast   • Genetic Disorder Paternal Grandmother        SOCIAL HISTORY  Social History     Socioeconomic History   • Marital status:    • Highest education level: Associate degree: academic program   Tobacco Use   • Smoking status: Former Smoker     Packs/day: 0.75     Years: 32.00     Pack years: 24.00     Types: Cigarettes     Quit date:      Years since quittin.3   • Smokeless tobacco: Never Used   • Tobacco comment: started at 16   Vaping Use   • Vaping Use: Never used   Substance and Sexual Activity   • Alcohol use: Not Currently     Comment: once a year   • Drug use: No   • Sexual activity: Never     Partners: Male     Social Determinants of Health     Financial Resource Strain: Low Risk    • Difficulty of Paying Living Expenses: Not hard at all   Food Insecurity: No Food Insecurity   • Worried About Running Out of Food in the Last Year: Never true   • Ran Out of Food in the Last Year: Never true   Transportation Needs: No Transportation Needs   • Lack of Transportation (Medical): No   • Lack of Transportation (Non-Medical): No   Physical Activity: Insufficiently Active   • Days of Exercise per Week: 3 days   • Minutes of Exercise per Session: 30 min   Stress: Stress Concern Present   • Feeling of Stress : To some extent   Social Connections: Moderately Integrated   • Frequency of Communication with Friends and Family: More than three times a week   • Frequency of Social Gatherings with Friends and Family: More than three times a week   • Attends Jainism Services: More than 4 times per year   • Active Member of Clubs or Organizations: Yes   • Attends Club or Organization Meetings: More than 4 times per year   • Marital Status:    Housing Stability: High Risk   • Unable to Pay for Housing in the Last Year: No   • Number of Places Lived in the Last Year: 3   • Unstable Housing in the Last Year: No       SURGICAL HISTORY  Past  "Surgical History:   Procedure Laterality Date   • CERVICAL FUSION POSTERIOR  7/18/2012    Performed by LA NENA JENNINGS at [facility]   • CERVICAL DECOMPRESSION POSTERIOR  7/18/2012    Performed by LA NENA JENNINGS at [facility]   • CERVICAL FORAMINOTOMY  7/18/2012    Performed by LA NENA JENNINGS at [facility]   • INJ,EPIDURAL,CERV/THOR SINGLE  6/1/2012    Performed by ORLY ORTA at [facility]   • CHOLECYSTECTOMY     • GYN SURGERY      hysterectomy   • OTHER      T&A   • OTHER ORTHOPEDIC SURGERY      kaur bunionectomy       CURRENT MEDICATIONS  Home Medications     Reviewed by Henrry Caldwell (Pharmacy Tech) on 05/07/22 at 1435  Med List Status: Complete   Medication Last Dose Status   amLODIPine (NORVASC) 5 MG Tab 5/7/2022 Active   amoxicillin (AMOXIL) 500 MG Cap > 2 WEEKS Active   Ascorbic Acid (VITAMIN C) 1000 MG Tab 5/5/2022 Active   chlorhexidine (PERIDEX) 0.12 % Solution New RX Active   levothyroxine (SYNTHROID) 100 MCG Tab 5/7/2022 Active   losartan (COZAAR) 100 MG Tab 5/7/2022 Active   MAGNESIUM PO 5/7/2022 Active   VITAMIN E PO 5/5/2022 Active                ALLERGIES  Allergies   Allergen Reactions   • Penicillins Unspecified     Patient states \"makes me nasty and mean\"  Patient takes amoxicillin prior to dental procedures       PHYSICAL EXAM  VITAL SIGNS: BP (!) 186/77   Pulse (!) 58   Temp 36.3 °C (97.3 °F) (Temporal)   Resp 20   Ht 1.626 m (5' 4\")   Wt 75.3 kg (166 lb) Comment: unable to stand due to dizziness  LMP  (LMP Unknown)   SpO2 94%   Breastfeeding No   BMI 28.49 kg/m²    Oxygen saturation is interpreted as adequate  Constitutional: Awake lucid verbal pleasant individual she does not appear toxic or distressed  Eyes: No erythema discharge or jaundice  Neck: Trachea midline no JVD  Cardiovascular: Regular rate and rhythm  Lungs: Clear and equal bilaterally with no apparent difficulty breathing  Abdomen/Back: Soft nontender " nondistended no rebound guarding or peritoneal findings  Skin: Warm and dry  Musculoskeletal: No leg edema or calf tenderness  Neurologic: Awake verbal moving all extremities without difficulty  Vascular: The patient has strong easily palpable radial pulses bilaterally and blood pressure in the right arm is 159/87 and blood pressure in the left arm is essentially identical at 160/85    CHART REVIEW  The patient was seen in the emergency department on March 2, 2022 for dizzy spells and she was evaluated and ultimately discharged home.    Laboratory  Today in the ER CBC shows a white blood cell count of 14.2 and the patient states that her white blood cell count is chronically elevated and for comparison 2 months ago the value was 14.9.  Hemoglobin today is adequate at 16 basic metabolic panel is unremarkable alk phos showed a mildly elevated value of 163.  The initial troponin was obtained at 1:20 PM and is completely normal at less than 6.  A 2-hour repeat troponin remains completely normal and in fact undetectable at less than 6.    EKG interpretation  Twelve-lead EKG shows sinus rhythm 61 bpm no pathologic ST elevation there are T wave inversions in V1 and V2 and findings are very similar to the EKG from March 2, 2022    Radiology  CT-CTA CHEST PULMONARY ARTERY W/ RECONS   Final Result      1.  No CT evidence of pulmonary embolism.      2.  No change in left adrenal mass which is likely an adenoma.            DX-CHEST-PORTABLE (1 VIEW)   Final Result         No acute cardiac or pulmonary abnormality is identified.          MEDICAL DECISION MAKING and DISPOSITION  In the emergency department the patient remains hemodynamically stable her symptoms have resolved she is feeling a lot better I reviewed all the findings in detail with the patient she thinks that most likely her symptoms are related to the stress that she is experiencing.  At this point in time I think will be safe for her to go home I have advised her  to call her primary care doctor Monday morning and arrange office recheck during the week and if she feels that at any time she has recurrent new or worsening symptoms she is to return here at once for recheck.    Also I did review the incidental finding of a left adrenal mass on the CT scan according to the radiology report it is unchanged compared to the scan from March 6, 2019 and I feel will be safe for the patient to follow-up with her primary care doctor regarding this finding    IMPRESSION  1.  Chest pain, unspecified  2.  Incidental finding of stable left adrenal mass on CT scan         Electronically signed by: Howard Lu M.D., 5/7/2022 4:00 PM

## 2022-05-07 NOTE — ED NOTES
Pt resting on gurney, pt in no acute distress, pt provided call light, instructed to call if needing any assistance, instructed not to get up by self, donte in lowest position.    Guest at bedside    Tech at bedside

## 2022-05-09 ENCOUNTER — TELEPHONE (OUTPATIENT)
Dept: SCHEDULING | Facility: IMAGING CENTER | Age: 77
End: 2022-05-09
Payer: MEDICARE

## 2022-05-10 ENCOUNTER — OFFICE VISIT (OUTPATIENT)
Dept: MEDICAL GROUP | Facility: PHYSICIAN GROUP | Age: 77
End: 2022-05-10
Payer: MEDICARE

## 2022-05-10 VITALS
SYSTOLIC BLOOD PRESSURE: 122 MMHG | HEART RATE: 73 BPM | BODY MASS INDEX: 29.23 KG/M2 | WEIGHT: 171.2 LBS | OXYGEN SATURATION: 93 % | RESPIRATION RATE: 18 BRPM | TEMPERATURE: 97.4 F | HEIGHT: 64 IN | DIASTOLIC BLOOD PRESSURE: 74 MMHG

## 2022-05-10 DIAGNOSIS — R53.83 FATIGUE, UNSPECIFIED TYPE: ICD-10-CM

## 2022-05-10 DIAGNOSIS — R42 DIZZINESS: ICD-10-CM

## 2022-05-10 DIAGNOSIS — R07.89 OTHER CHEST PAIN: ICD-10-CM

## 2022-05-10 DIAGNOSIS — Z00.00 HEALTH CARE MAINTENANCE: ICD-10-CM

## 2022-05-10 DIAGNOSIS — R74.8 ELEVATED ALKALINE PHOSPHATASE LEVEL: ICD-10-CM

## 2022-05-10 DIAGNOSIS — R79.9 ABNORMAL FINDING OF BLOOD CHEMISTRY, UNSPECIFIED: ICD-10-CM

## 2022-05-10 DIAGNOSIS — D17.9 LIPOMA, UNSPECIFIED SITE: ICD-10-CM

## 2022-05-10 PROCEDURE — 99214 OFFICE O/P EST MOD 30 MIN: CPT | Performed by: FAMILY MEDICINE

## 2022-05-10 ASSESSMENT — ENCOUNTER SYMPTOMS
FEVER: 0
DIZZINESS: 1
PALPITATIONS: 0
NAUSEA: 0
COUGH: 0
DOUBLE VISION: 0
SORE THROAT: 0
SHORTNESS OF BREATH: 0
BLURRED VISION: 0
CHILLS: 0
MYALGIAS: 0
HEADACHES: 0
VOMITING: 0

## 2022-05-10 ASSESSMENT — FIBROSIS 4 INDEX: FIB4 SCORE: 0.78

## 2022-05-10 NOTE — PROGRESS NOTES
Subjective:     CC:  ER f/u CP R sided, resolved     HPI:   Alton presents today with     1) ER f/u R sided CP,  CT chest negative, ekg, trops neg  2)ALP elevated  Needs work up, had GB out  3) chronic lightheaded, worse sitting to standing, was told she has ortho statics     4) BP labile, 180 in ER  Now 122  BP logs   Diastolic low  5) labs to r/o dizzy and ALP  6) lipoma vs lump nape of neck            Problem   Health Care Maintenance   Lipoma   Abnormal finding of blood chemistry, unspecified    Fatigue   Dizziness   Elevated Alkaline Phosphatase Level   Other Chest Pain    R sided, now resolved  Unknown, CT chest EKG Trops neg  GB is out,  Liver capsule stretch? And referred pain  F/u labs  rtc is concerns           Current Outpatient Medications Ordered in Epic   Medication Sig Dispense Refill   • chlorhexidine (PERIDEX) 0.12 % Solution Take 15 mL by mouth 2 times a day. Swish and spit.     • amLODIPine (NORVASC) 5 MG Tab Take 5 mg by mouth every day.     • levothyroxine (SYNTHROID) 100 MCG Tab Take 100 mcg by mouth every morning on an empty stomach.     • MAGNESIUM PO Take 495 mg by mouth at bedtime. 1 capsule = 165 mg.     • Ascorbic Acid (VITAMIN C) 1000 MG Tab Take 3,000 mg by mouth every evening. 3 tablets = 3,000 mg.     • VITAMIN E PO Take 1 Capsule by mouth every evening.     • losartan (COZAAR) 100 MG Tab Take 1 Tablet by mouth every day. 90 Tablet 3   • amoxicillin (AMOXIL) 500 MG Cap Take 2,000 mg by mouth 1 time a day as needed (prior to dental procedures). 4 capsules = 2,000 mg.       No current [facility]-ordered facility-administered medications on file.     ROS:  Review of Systems   Constitutional: Negative for chills and fever.   HENT: Negative for ear pain and sore throat.    Eyes: Negative for blurred vision and double vision.   Respiratory: Negative for cough and shortness of breath.    Cardiovascular: Negative for chest pain and palpitations.   Gastrointestinal: Negative for nausea and  "vomiting.   Genitourinary: Negative for dysuria and hematuria.   Musculoskeletal: Negative for myalgias.   Neurological: Positive for dizziness. Negative for headaches.       Objective:     Exam:  /74 (BP Location: Left arm, Patient Position: Sitting, BP Cuff Size: Adult)   Pulse 73   Temp 36.3 °C (97.4 °F) (Temporal)   Resp 18   Ht 1.626 m (5' 4\")   Wt 77.7 kg (171 lb 3.2 oz)   LMP  (LMP Unknown)   SpO2 93%   BMI 29.39 kg/m²  Body mass index is 29.39 kg/m².    Physical Exam  Constitutional:       General: She is not in acute distress.     Appearance: Normal appearance. She is not ill-appearing, toxic-appearing or diaphoretic.   HENT:      Head: Normocephalic and atraumatic.   Eyes:      General: No scleral icterus.        Right eye: No discharge.         Left eye: No discharge.      Extraocular Movements: Extraocular movements intact.      Conjunctiva/sclera: Conjunctivae normal.      Pupils: Pupils are equal, round, and reactive to light.   Cardiovascular:      Rate and Rhythm: Normal rate and regular rhythm.      Pulses: Normal pulses.      Heart sounds: Murmur heard.   Pulmonary:      Effort: Pulmonary effort is normal. No respiratory distress.      Breath sounds: Normal breath sounds.   Abdominal:      General: There is no distension.   Musculoskeletal:      Cervical back: Normal range of motion and neck supple. No rigidity.   Neurological:      Mental Status: She is alert.      Coordination: Coordination normal.      Gait: Gait normal.         Assessment & Plan:     76 y.o. female with the following -     Problem List Items Addressed This Visit     Health care maintenance    Relevant Orders    HEP C VIRUS ANTIBODY    Lipoma     On nape of neck  Told it there was no capsule to take out.  Ref to surgery for second opinion         Relevant Orders    Referral to General Surgery    Abnormal finding of blood chemistry, unspecified     Relevant Orders    IRON/TOTAL IRON BIND    FERRITIN    Fatigue    " Relevant Orders    VITAMIN B12    IRON/TOTAL IRON BIND    FERRITIN    TSH WITH REFLEX TO FT4    Dizziness     Likely orthostatic  Will repeat at next visit          Relevant Orders    VITAMIN B12    Elevated alkaline phosphatase level     F/u labs  Will discuss US         Relevant Orders    ALKALINE PHOSPHATASE    5' NUCLEOTIDASE    PTH INTACT (PTH ONLY)    CALCIUM (CA)    CREATININE    ALBUMIN    VITAMIN D,25 HYDROXY    TSH WITH REFLEX TO FT4    SPEP W/REFLEX TO EARNEST, A, G, M    PROTEIN ELECTROPHORESIS, UR    Other chest pain        Return in about 2 weeks (around 5/24/2022), or if symptoms worsen or fail to improve, for f/u labs, f/u bp.    Please note that this dictation was created using voice recognition software. I have made every reasonable attempt to correct obvious errors, but I expect that there are errors of grammar and possibly content that I did not discover before finalizing the note.

## 2022-05-16 ENCOUNTER — HOSPITAL ENCOUNTER (OUTPATIENT)
Dept: LAB | Facility: MEDICAL CENTER | Age: 77
End: 2022-05-16
Attending: FAMILY MEDICINE
Payer: MEDICARE

## 2022-05-16 DIAGNOSIS — R42 DIZZINESS: ICD-10-CM

## 2022-05-16 DIAGNOSIS — R74.8 ELEVATED ALKALINE PHOSPHATASE LEVEL: ICD-10-CM

## 2022-05-16 DIAGNOSIS — R79.9 ABNORMAL FINDING OF BLOOD CHEMISTRY, UNSPECIFIED: ICD-10-CM

## 2022-05-16 DIAGNOSIS — R53.83 FATIGUE, UNSPECIFIED TYPE: ICD-10-CM

## 2022-05-16 DIAGNOSIS — Z00.00 HEALTH CARE MAINTENANCE: ICD-10-CM

## 2022-05-16 LAB
ALBUMIN SERPL BCP-MCNC: 4.7 G/DL (ref 3.2–4.9)
ALP SERPL-CCNC: 169 U/L (ref 30–99)
CALCIUM SERPL-MCNC: 10 MG/DL (ref 8.5–10.5)
CREAT SERPL-MCNC: 0.83 MG/DL (ref 0.5–1.4)
FERRITIN SERPL-MCNC: 149 NG/ML (ref 10–291)
GFR SERPLBLD CREATININE-BSD FMLA CKD-EPI: 73 ML/MIN/1.73 M 2
HCV AB SER QL: NORMAL
IRON SATN MFR SERPL: 36 % (ref 15–55)
IRON SERPL-MCNC: 116 UG/DL (ref 40–170)
PTH-INTACT SERPL-MCNC: 41.7 PG/ML (ref 14–72)
TIBC SERPL-MCNC: 326 UG/DL (ref 250–450)
TSH SERPL DL<=0.005 MIU/L-ACNC: 1.44 UIU/ML (ref 0.38–5.33)
UIBC SERPL-MCNC: 210 UG/DL (ref 110–370)
VIT B12 SERPL-MCNC: 1202 PG/ML (ref 211–911)

## 2022-05-16 PROCEDURE — 86803 HEPATITIS C AB TEST: CPT

## 2022-05-16 PROCEDURE — 82784 ASSAY IGA/IGD/IGG/IGM EACH: CPT

## 2022-05-16 PROCEDURE — 82306 VITAMIN D 25 HYDROXY: CPT

## 2022-05-16 PROCEDURE — 83915 ASSAY OF NUCLEOTIDASE: CPT

## 2022-05-16 PROCEDURE — 86334 IMMUNOFIX E-PHORESIS SERUM: CPT

## 2022-05-16 PROCEDURE — 83550 IRON BINDING TEST: CPT

## 2022-05-16 PROCEDURE — 83540 ASSAY OF IRON: CPT

## 2022-05-16 PROCEDURE — 84165 PROTEIN E-PHORESIS SERUM: CPT

## 2022-05-16 PROCEDURE — 86335 IMMUNFIX E-PHORSIS/URINE/CSF: CPT

## 2022-05-16 PROCEDURE — 84166 PROTEIN E-PHORESIS/URINE/CSF: CPT

## 2022-05-16 PROCEDURE — 84443 ASSAY THYROID STIM HORMONE: CPT

## 2022-05-16 PROCEDURE — 82607 VITAMIN B-12: CPT

## 2022-05-16 PROCEDURE — 84156 ASSAY OF PROTEIN URINE: CPT

## 2022-05-16 PROCEDURE — 82728 ASSAY OF FERRITIN: CPT

## 2022-05-16 PROCEDURE — 82565 ASSAY OF CREATININE: CPT

## 2022-05-16 PROCEDURE — 84155 ASSAY OF PROTEIN SERUM: CPT

## 2022-05-16 PROCEDURE — 84075 ASSAY ALKALINE PHOSPHATASE: CPT

## 2022-05-16 PROCEDURE — 36415 COLL VENOUS BLD VENIPUNCTURE: CPT

## 2022-05-16 PROCEDURE — 82310 ASSAY OF CALCIUM: CPT

## 2022-05-16 PROCEDURE — 83970 ASSAY OF PARATHORMONE: CPT

## 2022-05-16 PROCEDURE — 82040 ASSAY OF SERUM ALBUMIN: CPT

## 2022-05-18 LAB — 5NT SERPL-CCNC: 12 U/L (ref 0–15)

## 2022-05-19 LAB
ALBUMIN SERPL ELPH-MCNC: 4.12 G/DL (ref 3.75–5.01)
ALPHA1 GLOB SERPL ELPH-MCNC: 0.41 G/DL (ref 0.19–0.46)
ALPHA2 GLOB SERPL ELPH-MCNC: 0.85 G/DL (ref 0.48–1.05)
B-GLOBULIN SERPL ELPH-MCNC: 1.28 G/DL (ref 0.48–1.1)
GAMMA GLOB SERPL ELPH-MCNC: 1.14 G/DL (ref 0.62–1.51)
IGA SERPL-MCNC: 555 MG/DL (ref 68–408)
IGG SERPL-MCNC: 1040 MG/DL (ref 768–1632)
IGM SERPL-MCNC: 130 MG/DL (ref 35–263)
INTERPRETATION SERPL IFE-IMP: ABNORMAL
MONOCLON BAND OBS SERPL: ABNORMAL
PATHOLOGY STUDY: ABNORMAL
PROT SERPL-MCNC: 7.8 G/DL (ref 6.3–8.2)

## 2022-05-20 LAB
25(OH)D3 SERPL-MCNC: 46 NG/ML (ref 30–80)
ALBUMIN 24H MFR UR ELPH: 44.4 %
ALPHA1 GLOB 24H MFR UR ELPH: 16 %
ALPHA2 GLOB 24H MFR UR ELPH: 20.5 %
B-GLOBULIN 24H MFR UR ELPH: 14.7 %
COLLECT DURATION TIME SPEC: NORMAL HRS
EER MONOCLONAL PROTEIN STUDY, 24 HOUR U Q5964: NORMAL
GAMMA GLOB 24H MFR UR ELPH: 4.4 %
INTERPRETATION UR IFE-IMP: NORMAL
M PROTEIN 24H MFR UR ELPH: 0 %
M PROTEIN 24H UR ELPH-MRATE: NORMAL MG/24 HRS
PROT 24H UR-MRATE: NORMAL MG/D (ref 40–150)
PROT UR-MCNC: 13 MG/DL
SPECIMEN VOL ?TM UR: NORMAL ML

## 2022-05-26 ENCOUNTER — OFFICE VISIT (OUTPATIENT)
Dept: MEDICAL GROUP | Facility: PHYSICIAN GROUP | Age: 77
End: 2022-05-26
Payer: MEDICARE

## 2022-05-26 VITALS
SYSTOLIC BLOOD PRESSURE: 132 MMHG | TEMPERATURE: 97.6 F | HEIGHT: 64 IN | RESPIRATION RATE: 14 BRPM | HEART RATE: 74 BPM | OXYGEN SATURATION: 94 % | BODY MASS INDEX: 29.19 KG/M2 | WEIGHT: 171 LBS | DIASTOLIC BLOOD PRESSURE: 70 MMHG

## 2022-05-26 DIAGNOSIS — I10 PRIMARY HYPERTENSION: ICD-10-CM

## 2022-05-26 DIAGNOSIS — R74.8 ELEVATED ALKALINE PHOSPHATASE LEVEL: ICD-10-CM

## 2022-05-26 PROCEDURE — 99213 OFFICE O/P EST LOW 20 MIN: CPT | Performed by: FAMILY MEDICINE

## 2022-05-26 ASSESSMENT — ENCOUNTER SYMPTOMS
VOMITING: 0
SORE THROAT: 0
FEVER: 0
NAUSEA: 0
MYALGIAS: 0
CHILLS: 0
DOUBLE VISION: 0
DIZZINESS: 0
BLURRED VISION: 0
SHORTNESS OF BREATH: 0
HEADACHES: 0
PALPITATIONS: 0
COUGH: 0

## 2022-05-26 ASSESSMENT — FIBROSIS 4 INDEX: FIB4 SCORE: 0.78

## 2022-05-26 NOTE — ASSESSMENT & PLAN NOTE
Chronic  At goal today  Pt to monitor and drop off logs or RTC  Would like to eventually come off meds...  Would like to have 50mg and 25mg tabs to titrate to minimum dose  rtc for eval

## 2022-05-26 NOTE — PROGRESS NOTES
Annual Health Assessment Questions:    1.  Are you currently engaging in any exercise or physical activity? Yes    2.  How would you describe your mood or emotional well-being today? good    3.  Have you had any falls in the last year? No    4.  Have you noticed any problems with your balance or had difficulty walking? No    5.  In the last six months have you experienced any leakage of urine? No    6. DPA/Advanced Directive: Patient does not have an Advanced Directive.  A packet and workshop information was given on Advanced Directives.       Subjective:     CC:  bp and lab f/u   HPI:   Alton presents today with     Arrives with bp logs  States bp was up, then went down so she stopped meds  Then it went up and then restarted losatran 100   Not taking amlodipine 5mg  No new dizzy spells    Labs, ALP still elevated  IgA spike     Ref to surgery for lipoma    Needs advanced directive form      Problem   Elevated Alkaline Phosphatase Level   Primary Hypertension    Chronic, she has a longstanding history of hypertension to which she was taking hydrochlorothiazide 25 mg daily and losartan 100 mg daily for.  She recently was in the ED for vertigo and chronic dehydration.  At that time she stopped her hydrochlorothiazide and is now only currently taking the losartan 100 mg daily.  She was using a wrist cuff at home which was showing her blood pressures to be in the 180s over 90s, however today in clinic it is 138/82.  She denies any headaches, chest pains, shortness of breath, or dizziness.  States that she was having dizziness prior to stopping the hydrochlorothiazide.  Blood pressure log given today in clinic and patient is going to buy a new cuff and log home blood pressures.  We will follow up in 1 month.         Current Outpatient Medications Ordered in Epic   Medication Sig Dispense Refill   • chlorhexidine (PERIDEX) 0.12 % Solution Take 15 mL by mouth 2 times a day. Swish and spit.     • levothyroxine (SYNTHROID)  "100 MCG Tab Take 100 mcg by mouth every morning on an empty stomach.     • MAGNESIUM PO Take 495 mg by mouth at bedtime. 1 capsule = 165 mg.     • Ascorbic Acid (VITAMIN C) 1000 MG Tab Take 3,000 mg by mouth every evening. 3 tablets = 3,000 mg.     • VITAMIN E PO Take 1 Capsule by mouth every evening.     • losartan (COZAAR) 100 MG Tab Take 1 Tablet by mouth every day. 90 Tablet 3   • amoxicillin (AMOXIL) 500 MG Cap Take 2,000 mg by mouth 1 time a day as needed (prior to dental procedures). 4 capsules = 2,000 mg.       No current [facility]-ordered facility-administered medications on file.     ROS:  Review of Systems   Constitutional: Negative for chills and fever.   HENT: Negative for ear pain and sore throat.    Eyes: Negative for blurred vision and double vision.   Respiratory: Negative for cough and shortness of breath.    Cardiovascular: Negative for chest pain and palpitations.   Gastrointestinal: Negative for nausea and vomiting.   Genitourinary: Negative for dysuria and hematuria.   Musculoskeletal: Negative for myalgias.   Neurological: Negative for dizziness and headaches.       Objective:     Exam:  /70 (BP Location: Left arm, Patient Position: Sitting, BP Cuff Size: Adult)   Pulse 74   Temp 36.4 °C (97.6 °F) (Temporal)   Resp 14   Ht 1.626 m (5' 4\")   Wt 77.6 kg (171 lb)   LMP  (LMP Unknown)   SpO2 94%   BMI 29.35 kg/m²  Body mass index is 29.35 kg/m².    Physical Exam  Constitutional:       General: She is not in acute distress.     Appearance: Normal appearance. She is not ill-appearing, toxic-appearing or diaphoretic.   HENT:      Head: Normocephalic and atraumatic.   Eyes:      General: No scleral icterus.        Right eye: No discharge.         Left eye: No discharge.      Extraocular Movements: Extraocular movements intact.      Conjunctiva/sclera: Conjunctivae normal.      Pupils: Pupils are equal, round, and reactive to light.   Cardiovascular:      Rate and Rhythm: Normal rate and " regular rhythm.      Pulses: Normal pulses.      Heart sounds: Normal heart sounds. No murmur heard.  Pulmonary:      Effort: Pulmonary effort is normal. No respiratory distress.      Breath sounds: Normal breath sounds.   Abdominal:      General: There is no distension.   Neurological:      Mental Status: She is alert.      Coordination: Coordination normal.      Gait: Gait normal.         Assessment & Plan:     76 y.o. female with the following -     Problem List Items Addressed This Visit     Primary hypertension     Chronic  At goal today  Pt to monitor and drop off logs or RTC  Would like to eventually come off meds...  Would like to have 50mg and 25mg tabs to titrate to minimum dose  rtc for eval            Elevated alkaline phosphatase level     Chronic   IgA spike on SPEP  No symptoms  Will discuss additional work up  Ref to endo?                 Return in about 4 weeks (around 6/23/2022) for f/u bp, f/u labs.    Please note that this dictation was created using voice recognition software. I have made every reasonable attempt to correct obvious errors, but I expect that there are errors of grammar and possibly content that I did not discover before finalizing the note.

## 2022-06-02 ENCOUNTER — APPOINTMENT (OUTPATIENT)
Dept: PHYSICAL THERAPY | Facility: REHABILITATION | Age: 77
End: 2022-06-02
Attending: FAMILY MEDICINE
Payer: MEDICARE

## 2022-06-06 ENCOUNTER — OFFICE VISIT (OUTPATIENT)
Dept: MEDICAL GROUP | Facility: PHYSICIAN GROUP | Age: 77
End: 2022-06-06
Payer: MEDICARE

## 2022-06-06 VITALS
OXYGEN SATURATION: 94 % | SYSTOLIC BLOOD PRESSURE: 142 MMHG | DIASTOLIC BLOOD PRESSURE: 76 MMHG | TEMPERATURE: 97.6 F | HEART RATE: 68 BPM | WEIGHT: 170.8 LBS | HEIGHT: 64 IN | RESPIRATION RATE: 16 BRPM | BODY MASS INDEX: 29.16 KG/M2

## 2022-06-06 DIAGNOSIS — Z71.9 ENCOUNTER FOR CONSULTATION: ICD-10-CM

## 2022-06-06 DIAGNOSIS — R74.8 ELEVATED ALKALINE PHOSPHATASE LEVEL: ICD-10-CM

## 2022-06-06 PROCEDURE — 99214 OFFICE O/P EST MOD 30 MIN: CPT | Performed by: STUDENT IN AN ORGANIZED HEALTH CARE EDUCATION/TRAINING PROGRAM

## 2022-06-06 ASSESSMENT — FIBROSIS 4 INDEX: FIB4 SCORE: 0.78

## 2022-06-06 NOTE — PROGRESS NOTES
"CC:  Diagnoses of Elevated alkaline phosphatase level and Encounter for consultation were pertinent to this visit.    HISTORY OF THE PRESENT ILLNESS: Patient is a 76 y.o. female. This pleasant patient is here today to discuss abnormal labs. Her PCP is Ibrahima Anthony MD.    Ms Childs presents today concerned about her chronically elevated alkaline phosphatase and wishes further work-up and direction.    No problem-specific Assessment & Plan notes found for this encounter.    Current Outpatient Medications Ordered in Epic   Medication Sig Dispense Refill   • chlorhexidine (PERIDEX) 0.12 % Solution Take 15 mL by mouth 2 times a day. Swish and spit.     • levothyroxine (SYNTHROID) 100 MCG Tab Take 100 mcg by mouth every morning on an empty stomach.     • MAGNESIUM PO Take 495 mg by mouth at bedtime. 1 capsule = 165 mg.     • Ascorbic Acid (VITAMIN C) 1000 MG Tab Take 3,000 mg by mouth every evening. 3 tablets = 3,000 mg.     • VITAMIN E PO Take 1 Capsule by mouth every evening.     • losartan (COZAAR) 100 MG Tab Take 1 Tablet by mouth every day. 90 Tablet 3   • amoxicillin (AMOXIL) 500 MG Cap Take 2,000 mg by mouth 1 time a day as needed (prior to dental procedures). 4 capsules = 2,000 mg.       No current [facility]-ordered facility-administered medications on file.     ROS:   Gen: No fevers/chills/night sweats/weight loss.  Pulm: no sob, no cough  CV: no chest pain/pressure, no palpitations  MSk: no myalgias  Skin: no rash   Neuro: no headaches, no numbness/tingling  Heme/Lymph: no easy bruising      Objective:     Exam: BP (!) 142/76 (BP Location: Left arm, Patient Position: Sitting, BP Cuff Size: Adult)   Pulse 68   Temp 36.4 °C (97.6 °F) (Temporal)   Resp 16   Ht 1.626 m (5' 4\")   Wt 77.5 kg (170 lb 12.8 oz)   SpO2 94%  Body mass index is 29.32 kg/m².    General: Normal appearing. No distress.  Neck: Supple without JVD. Thyroid is not enlarged.  Pulmonary: Clear to ausculation.  Normal effort. No rales, " ronchi, or wheezing.  Cardiovascular: Regular rate and rhythm with murmur.   Abdomen: Soft, nontender, nondistended. Normal bowel sounds. No HSM.  Lymph: No cervical or supraclavicular lymph nodes are palpable  Skin: Warm and dry.  No obvious lesions.    A chaperone was offered to the patient during today's exam. Patient declined chaperone.    Labs:   5/16/2022:  -Alk phos elevated at 169   -calcium 10.0  -5 prime nucleotidase negative  -Vitamin D 46  -IgA 555  -TSH 1.44  -PTH 41.7  -UPEP elevated beta globulin 1.28    Assessment & Plan:   76 y.o. female with the following -    1. Elevated alkaline phosphatase level  -Undiagnosed new problem with uncertain prognosis.  -E consult placed with hematology/oncology.  -I will contact the patient and discussed the neck steps when I hear back from hematology/oncology.    Return if symptoms worsen or fail to improve.    Please note that this dictation was created using voice recognition software. I have made every reasonable attempt to correct obvious errors, but I expect that there are errors of grammar and possibly content that I did not discover before finalizing the note.    Harjeet Hammonds PA-C 6/6/2022

## 2022-06-07 ENCOUNTER — E-CONSULT (OUTPATIENT)
Dept: HEMATOLOGY ONCOLOGY | Facility: MEDICAL CENTER | Age: 77
End: 2022-06-07
Payer: MEDICARE

## 2022-06-07 ENCOUNTER — APPOINTMENT (OUTPATIENT)
Dept: PHYSICAL THERAPY | Facility: REHABILITATION | Age: 77
End: 2022-06-07
Attending: FAMILY MEDICINE
Payer: MEDICARE

## 2022-06-07 DIAGNOSIS — Z71.9 ENCOUNTER FOR CONSULTATION: ICD-10-CM

## 2022-06-07 PROCEDURE — 99446 NTRPROF PH1/NTRNET/EHR 5-10: CPT | Performed by: INTERNAL MEDICINE

## 2022-06-07 NOTE — PROGRESS NOTES
E-Consult Response     After careful review of the patient's information available in the medical record, the following are my findings and recommendations:    Reason for consult:  Elevated alkaline phosphatase and elevated IgA in a 76 year old woman    Summary of data reviewed: Elevated alk phos dates back to at least 2013. IgA is elevated with normal IgG and IgM and no monoclonal spike on SPEP    Recommendations: Since it is so long standing I would probably ignore the alk phos. I would just repeat the immunoelectrophoresis in 6 months and make sure they do light chains as well.      E-Consult Time: 10 minutes were spent with >50% of the total time spent reviewing items outlined in the summary of data reviewed (Use code [id number])    Deny Hoff M.D.

## 2022-06-09 ENCOUNTER — APPOINTMENT (OUTPATIENT)
Dept: PHYSICAL THERAPY | Facility: REHABILITATION | Age: 77
End: 2022-06-09
Attending: FAMILY MEDICINE
Payer: MEDICARE

## 2022-06-14 ENCOUNTER — APPOINTMENT (OUTPATIENT)
Dept: PHYSICAL THERAPY | Facility: REHABILITATION | Age: 77
End: 2022-06-14
Attending: FAMILY MEDICINE
Payer: MEDICARE

## 2022-06-16 ENCOUNTER — APPOINTMENT (OUTPATIENT)
Dept: PHYSICAL THERAPY | Facility: REHABILITATION | Age: 77
End: 2022-06-16
Attending: FAMILY MEDICINE
Payer: MEDICARE

## 2022-06-21 ENCOUNTER — APPOINTMENT (OUTPATIENT)
Dept: PHYSICAL THERAPY | Facility: REHABILITATION | Age: 77
End: 2022-06-21
Attending: FAMILY MEDICINE
Payer: MEDICARE

## 2022-06-22 ENCOUNTER — OFFICE VISIT (OUTPATIENT)
Dept: MEDICAL GROUP | Facility: PHYSICIAN GROUP | Age: 77
End: 2022-06-22
Payer: MEDICARE

## 2022-06-22 VITALS
BODY MASS INDEX: 29.4 KG/M2 | SYSTOLIC BLOOD PRESSURE: 160 MMHG | HEART RATE: 73 BPM | HEIGHT: 64 IN | WEIGHT: 172.2 LBS | TEMPERATURE: 97.6 F | DIASTOLIC BLOOD PRESSURE: 78 MMHG | OXYGEN SATURATION: 94 % | RESPIRATION RATE: 20 BRPM

## 2022-06-22 DIAGNOSIS — M25.512 CHRONIC LEFT SHOULDER PAIN: ICD-10-CM

## 2022-06-22 DIAGNOSIS — E03.9 ACQUIRED HYPOTHYROIDISM: ICD-10-CM

## 2022-06-22 DIAGNOSIS — G89.29 CHRONIC LEFT SHOULDER PAIN: ICD-10-CM

## 2022-06-22 DIAGNOSIS — I34.1 MITRAL VALVE PROLAPSE: ICD-10-CM

## 2022-06-22 DIAGNOSIS — I10 PRIMARY HYPERTENSION: ICD-10-CM

## 2022-06-22 PROCEDURE — 99212 OFFICE O/P EST SF 10 MIN: CPT | Performed by: FAMILY MEDICINE

## 2022-06-22 RX ORDER — LIOTHYRONINE SODIUM 25 UG/1
25 TABLET ORAL DAILY
Qty: 30 TABLET | Refills: 1 | Status: SHIPPED | OUTPATIENT
Start: 2022-06-22

## 2022-06-22 ASSESSMENT — FIBROSIS 4 INDEX: FIB4 SCORE: 0.79

## 2022-06-22 NOTE — PROGRESS NOTES
Subjective:     CC: check up  L shoulder    HPI:   [place] presents today with     1) med review,     2) L shoulder pain    3) MVP    4) HTN, did not take meds this AM    5) dec sleep the last 3-4 nights    Problem   Mitral Valve Prolapse   Chronic Left Shoulder Pain    Chronic, states that her left shoulder has been hurting over the last 3 to 6 months.  She denies any trauma or falls that have caused this.  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  She does not have decreased range of motion or strength in her shoulder arm or hands.  Pulses intact.  She declines an x-ray or seeing Ortho at this time.  She states that she would like to wait a few more weeks and if her pain does not improve she will do imaging at that time.  She states that she had this issue with her right shoulder many years ago and had a cortisone injection and then it went away.  We will discuss again at our appointment in 1 month.     Primary Hypertension    Chronic, she has a longstanding history of hypertension to which she was taking hydrochlorothiazide 25 mg daily and losartan 100 mg daily for.  She recently was in the ED for vertigo and chronic dehydration.  At that time she stopped her hydrochlorothiazide and is now only currently taking the losartan 100 mg daily.  She was using a wrist cuff at home which was showing her blood pressures to be in the 180s over 90s, however today in clinic it is 138/82.  She denies any headaches, chest pains, shortness of breath, or dizziness.  States that she was having dizziness prior to stopping the hydrochlorothiazide.  Blood pressure log given today in clinic and patient is going to buy a new cuff and log home blood pressures.  We will follow up in 1 month.         Current Outpatient Medications Ordered in Epic   Medication Sig Dispense Refill   • chlorhexidine (PERIDEX) 0.12 % Solution Take 15 mL by mouth 2 times a day. Swish  "and spit.     • levothyroxine (SYNTHROID) 100 MCG Tab Take 100 mcg by mouth every morning on an empty stomach.     • MAGNESIUM PO Take 495 mg by mouth at bedtime. 1 capsule = 165 mg.     • Ascorbic Acid (VITAMIN C) 1000 MG Tab Take 3,000 mg by mouth every evening. 3 tablets = 3,000 mg.     • VITAMIN E PO Take 1 Capsule by mouth every evening.     • losartan (COZAAR) 100 MG Tab Take 1 Tablet by mouth every day. 90 Tablet 3   • amoxicillin (AMOXIL) 500 MG Cap Take 2,000 mg by mouth 1 time a day as needed (prior to dental procedures). 4 capsules = 2,000 mg.       No current [facility]-ordered facility-administered medications on file.     ROS:  ROS    Objective:     Exam:  BP (!) 160/78 (BP Location: Left arm, Patient Position: Sitting, BP Cuff Size: Large adult)   Pulse 73   Temp 36.4 °C (97.6 °F) (Temporal)   Resp 20   Ht 1.626 m (5' 4\")   Wt 78.1 kg (172 lb 3.2 oz)   LMP  (LMP Unknown)   SpO2 94%   BMI 29.56 kg/m²  Body mass index is 29.56 kg/m².    Physical Exam  Constitutional:       General: She is not in acute distress.     Appearance: Normal appearance. She is not ill-appearing, toxic-appearing or diaphoretic.   HENT:      Head: Normocephalic and atraumatic.   Eyes:      General: No scleral icterus.        Right eye: No discharge.         Left eye: No discharge.      Extraocular Movements: Extraocular movements intact.      Conjunctiva/sclera: Conjunctivae normal.      Pupils: Pupils are equal, round, and reactive to light.   Cardiovascular:      Pulses: Normal pulses.      Heart sounds: Normal heart sounds. No murmur heard.  Pulmonary:      Effort: Pulmonary effort is normal. No respiratory distress.      Breath sounds: Normal breath sounds.   Abdominal:      General: There is no distension.   Neurological:      Mental Status: She is alert.      Coordination: Coordination normal.      Gait: Gait normal.           Assessment & Plan:     77 y.o. female with the following -     Problem List Items Addressed This " Visit     Primary hypertension     Chronic, not at goal  States she does not get dizzy when the diastolic is higher  Cont diet exercise and wt loss  Cont to monitor  rtc if >150/90  ER if any concerns           Mitral valve prolapse     Chronic stable  Cont f/u with cardio           Chronic left shoulder pain     Chronic  Will call for appt with ortho for inject               Return in about 6 months (around 12/22/2022), or if symptoms worsen or fail to improve.    Please note that this dictation was created using voice recognition software. I have made every reasonable attempt to correct obvious errors, but I expect that there are errors of grammar and possibly content that I did not discover before finalizing the note.

## 2022-06-22 NOTE — ASSESSMENT & PLAN NOTE
Chronic, not at goal  States she does not get dizzy when the diastolic is higher  Cont diet exercise and wt loss  Cont to monitor  rtc if >150/90  ER if any concerns

## 2022-06-23 ENCOUNTER — APPOINTMENT (OUTPATIENT)
Dept: PHYSICAL THERAPY | Facility: REHABILITATION | Age: 77
End: 2022-06-23
Attending: FAMILY MEDICINE
Payer: MEDICARE

## 2022-06-28 ENCOUNTER — APPOINTMENT (OUTPATIENT)
Dept: PHYSICAL THERAPY | Facility: REHABILITATION | Age: 77
End: 2022-06-28
Attending: FAMILY MEDICINE
Payer: MEDICARE

## 2022-10-19 ENCOUNTER — DOCUMENTATION (OUTPATIENT)
Dept: HEALTH INFORMATION MANAGEMENT | Facility: OTHER | Age: 77
End: 2022-10-19
Payer: MEDICARE

## 2024-05-24 ENCOUNTER — APPOINTMENT (RX ONLY)
Dept: URBAN - METROPOLITAN AREA CLINIC 15 | Facility: CLINIC | Age: 79
Setting detail: DERMATOLOGY
End: 2024-05-24

## 2024-05-24 DIAGNOSIS — L57.8 OTHER SKIN CHANGES DUE TO CHRONIC EXPOSURE TO NONIONIZING RADIATION: ICD-10-CM

## 2024-05-24 DIAGNOSIS — L259 CONTACT DERMATITIS AND OTHER ECZEMA, UNSPECIFIED CAUSE: ICD-10-CM | Status: INADEQUATELY CONTROLLED

## 2024-05-24 PROBLEM — L23.9 ALLERGIC CONTACT DERMATITIS, UNSPECIFIED CAUSE: Status: ACTIVE | Noted: 2024-05-24

## 2024-05-24 PROCEDURE — ? COUNSELING

## 2024-05-24 PROCEDURE — 99203 OFFICE O/P NEW LOW 30 MIN: CPT

## 2024-05-24 PROCEDURE — ? ADDITIONAL NOTES

## 2024-05-24 PROCEDURE — ? PRESCRIPTION

## 2024-05-24 PROCEDURE — ? PRESCRIPTION MEDICATION MANAGEMENT

## 2024-05-24 RX ORDER — HYDROCORTISONE 25 MG/G
1 CREAM TOPICAL BID
Qty: 30 | Refills: 1 | Status: ERX | COMMUNITY
Start: 2024-05-24

## 2024-05-24 RX ADMIN — HYDROCORTISONE 1: 25 CREAM TOPICAL at 00:00

## 2024-05-24 ASSESSMENT — LOCATION DETAILED DESCRIPTION DERM
LOCATION DETAILED: LEFT CENTRAL MALAR CHEEK
LOCATION DETAILED: RIGHT CENTRAL MALAR CHEEK
LOCATION DETAILED: RIGHT CENTRAL BUCCAL CHEEK
LOCATION DETAILED: SUPERIOR MID FOREHEAD
LOCATION DETAILED: LEFT CENTRAL BUCCAL CHEEK

## 2024-05-24 ASSESSMENT — LOCATION SIMPLE DESCRIPTION DERM
LOCATION SIMPLE: LEFT CHEEK
LOCATION SIMPLE: SUPERIOR FOREHEAD
LOCATION SIMPLE: RIGHT CHEEK

## 2024-05-24 ASSESSMENT — LOCATION ZONE DERM: LOCATION ZONE: FACE

## 2024-05-24 NOTE — PROCEDURE: ADDITIONAL NOTES
Additional Notes: Pt reports allergic reaction after using new vitamin C lotion\\nPt went to ER/[facility] and was told that there was nothing that she could do\\nTried applying aloe gel which burned \\nRec Cetaphil/CeraVe moisturizer, warm compress, Vaseline
Detail Level: Zone
Render Risk Assessment In Note?: no
Additional Notes: Disc CO2 laser with Botox vs plastic surgery

## 2024-05-24 NOTE — PROCEDURE: PRESCRIPTION MEDICATION MANAGEMENT
Detail Level: Zone
Initiate Treatment: Hydrocortisone 2.5% cream BID 3-5 days
Render In Strict Bullet Format?: No